# Patient Record
Sex: FEMALE | URBAN - METROPOLITAN AREA
[De-identification: names, ages, dates, MRNs, and addresses within clinical notes are randomized per-mention and may not be internally consistent; named-entity substitution may affect disease eponyms.]

---

## 2018-02-15 ENCOUNTER — APPOINTMENT (RX ONLY)
Dept: URBAN - METROPOLITAN AREA CLINIC 361 | Facility: CLINIC | Age: 84
Setting detail: DERMATOLOGY
End: 2018-02-15

## 2018-02-15 DIAGNOSIS — L20.89 OTHER ATOPIC DERMATITIS: ICD-10-CM

## 2018-02-15 PROBLEM — L20.84 INTRINSIC (ALLERGIC) ECZEMA: Status: ACTIVE | Noted: 2018-02-15

## 2018-02-15 PROBLEM — E03.9 HYPOTHYROIDISM, UNSPECIFIED: Status: ACTIVE | Noted: 2018-02-15

## 2018-02-15 PROCEDURE — ? TREATMENT REGIMEN

## 2018-02-15 PROCEDURE — ? COUNSELING

## 2018-02-15 PROCEDURE — 99202 OFFICE O/P NEW SF 15 MIN: CPT

## 2018-02-15 PROCEDURE — ? PRESCRIPTION

## 2018-02-15 RX ORDER — TRIAMCINOLONE ACETONIDE 1 MG/G
CREAM TOPICAL
Qty: 1 | Refills: 0 | Status: ERX | COMMUNITY
Start: 2018-02-15

## 2018-02-15 RX ADMIN — TRIAMCINOLONE ACETONIDE: 1 CREAM TOPICAL at 00:00

## 2018-02-15 ASSESSMENT — LOCATION DETAILED DESCRIPTION DERM
LOCATION DETAILED: RIGHT ANTERIOR PROXIMAL THIGH
LOCATION DETAILED: LEFT ULNAR DORSAL HAND

## 2018-02-15 ASSESSMENT — LOCATION ZONE DERM
LOCATION ZONE: LEG
LOCATION ZONE: HAND

## 2018-02-15 ASSESSMENT — LOCATION SIMPLE DESCRIPTION DERM
LOCATION SIMPLE: RIGHT THIGH
LOCATION SIMPLE: LEFT HAND

## 2018-02-15 NOTE — PROCEDURE: MIPS QUALITY
Quality 431: Preventive Care And Screening: Unhealthy Alcohol Use - Screening: Patient screened for unhealthy alcohol use using a single question and scores less than 2 times per year
Quality 131: Pain Assessment And Follow-Up: Pain assessment using a standardized tool is documented as negative, no follow-up plan required
Quality 226: Preventive Care And Screening: Tobacco Use: Screening And Cessation Intervention: Patient screened for tobacco and never smoked
Quality 110: Preventive Care And Screening: Influenza Immunization: Influenza Immunization previously received during influenza season
Quality 111:Pneumonia Vaccination Status For Older Adults: Pneumococcal Vaccination Previously Received
Quality 134: Screening For Clinical Depression And Follow-Up Plan: The patient was screened for depression and the screen was negative and no follow up required
Detail Level: Detailed

## 2019-02-19 ENCOUNTER — APPOINTMENT (RX ONLY)
Dept: URBAN - METROPOLITAN AREA CLINIC 361 | Facility: CLINIC | Age: 85
Setting detail: DERMATOLOGY
End: 2019-02-19

## 2019-02-19 DIAGNOSIS — L60.3 NAIL DYSTROPHY: ICD-10-CM

## 2019-02-19 PROBLEM — L85.3 XEROSIS CUTIS: Status: ACTIVE | Noted: 2019-02-19

## 2019-02-19 PROCEDURE — ? PRESCRIPTION

## 2019-02-19 PROCEDURE — ? TREATMENT REGIMEN

## 2019-02-19 PROCEDURE — ? COUNSELING

## 2019-02-19 PROCEDURE — 99212 OFFICE O/P EST SF 10 MIN: CPT

## 2019-02-19 RX ORDER — THYMOL
CRYSTALS MISCELLANEOUS
Qty: 1 | Refills: 2 | Status: ERX | COMMUNITY
Start: 2019-02-19

## 2019-02-19 RX ADMIN — Medication: at 16:40

## 2019-02-19 ASSESSMENT — LOCATION DETAILED DESCRIPTION DERM: LOCATION DETAILED: RIGHT MIDDLE FINGERNAIL

## 2019-02-19 ASSESSMENT — LOCATION ZONE DERM: LOCATION ZONE: FINGERNAIL

## 2019-02-19 ASSESSMENT — LOCATION SIMPLE DESCRIPTION DERM: LOCATION SIMPLE: RIGHT MIDDLE FINGERNAIL

## 2019-02-19 NOTE — PROCEDURE: TREATMENT REGIMEN
Plan: Per patient she would like to continue going to nail salon during treatment
Initiate Treatment: Thymol 4% - soak finger for 5-10 minutes qd
Detail Level: Zone

## 2019-02-19 NOTE — HPI: DISCOLORATION
How Severe Is Your Skin Discoloration?: mild
Additional History: Patient states that she soaked her nail in [place]

## 2019-02-19 NOTE — PROCEDURE: MIPS QUALITY
Quality 226: Preventive Care And Screening: Tobacco Use: Screening And Cessation Intervention: Patient screened for tobacco use and is an ex/non-smoker
Quality 47: Advance Care Plan: Advance Care Planning discussed and documented in the medical record; patient did not wish or was not able to name a surrogate decision maker or provide an advance care plan.
Quality 431: Preventive Care And Screening: Unhealthy Alcohol Use - Screening: Patient screened for unhealthy alcohol use using a single question and scores less than 2 times per year
Detail Level: Detailed

## 2019-04-17 ENCOUNTER — APPOINTMENT (RX ONLY)
Dept: URBAN - METROPOLITAN AREA CLINIC 361 | Facility: CLINIC | Age: 85
Setting detail: DERMATOLOGY
End: 2019-04-17

## 2019-04-17 DIAGNOSIS — L60.3 NAIL DYSTROPHY: ICD-10-CM | Status: IMPROVED

## 2019-04-17 DIAGNOSIS — D69.2 OTHER NONTHROMBOCYTOPENIC PURPURA: ICD-10-CM

## 2019-04-17 PROCEDURE — ? COUNSELING

## 2019-04-17 PROCEDURE — 99212 OFFICE O/P EST SF 10 MIN: CPT

## 2019-04-17 PROCEDURE — ? TREATMENT REGIMEN

## 2019-04-17 ASSESSMENT — LOCATION DETAILED DESCRIPTION DERM
LOCATION DETAILED: LEFT DISTAL DORSAL FOREARM
LOCATION DETAILED: RIGHT MIDDLE FINGERNAIL

## 2019-04-17 ASSESSMENT — LOCATION ZONE DERM
LOCATION ZONE: FINGERNAIL
LOCATION ZONE: ARM

## 2019-04-17 ASSESSMENT — LOCATION SIMPLE DESCRIPTION DERM
LOCATION SIMPLE: LEFT FOREARM
LOCATION SIMPLE: RIGHT MIDDLE FINGERNAIL

## 2019-06-04 ENCOUNTER — APPOINTMENT (RX ONLY)
Dept: URBAN - METROPOLITAN AREA CLINIC 361 | Facility: CLINIC | Age: 85
Setting detail: DERMATOLOGY
End: 2019-06-04

## 2019-06-04 DIAGNOSIS — L60.8 OTHER NAIL DISORDERS: ICD-10-CM

## 2019-06-04 DIAGNOSIS — L60.3 NAIL DYSTROPHY: ICD-10-CM | Status: IMPROVED

## 2019-06-04 PROBLEM — L20.84 INTRINSIC (ALLERGIC) ECZEMA: Status: ACTIVE | Noted: 2019-06-04

## 2019-06-04 PROCEDURE — ? COUNSELING

## 2019-06-04 PROCEDURE — 99212 OFFICE O/P EST SF 10 MIN: CPT

## 2019-06-04 PROCEDURE — ? TREATMENT REGIMEN

## 2019-06-04 ASSESSMENT — LOCATION DETAILED DESCRIPTION DERM: LOCATION DETAILED: RIGHT MIDDLE FINGERNAIL

## 2019-06-04 ASSESSMENT — LOCATION ZONE DERM: LOCATION ZONE: FINGERNAIL

## 2019-06-04 ASSESSMENT — LOCATION SIMPLE DESCRIPTION DERM: LOCATION SIMPLE: RIGHT MIDDLE FINGERNAIL

## 2019-06-04 NOTE — PROCEDURE: TREATMENT REGIMEN
Detail Level: Simple
Plan: Improved compared to previous photo.
Detail Level: Zone
Plan: .8mm of normal nail growth present today.\\nRecommend that patient keep nails short to prevent further trauma to nail.\\nRecommend 6 month follow up to recheck
Discontinue Regimen: Thymol - soak finger for 5-10 min  qd x 2 months

## 2019-10-02 ENCOUNTER — APPOINTMENT (EMERGENCY)
Dept: RADIOLOGY | Facility: HOSPITAL | Age: 84
End: 2019-10-02
Attending: EMERGENCY MEDICINE
Payer: MEDICARE

## 2019-10-02 ENCOUNTER — HOSPITAL ENCOUNTER (EMERGENCY)
Facility: HOSPITAL | Age: 84
Discharge: HOME | End: 2019-10-02
Attending: EMERGENCY MEDICINE
Payer: MEDICARE

## 2019-10-02 VITALS
TEMPERATURE: 98 F | OXYGEN SATURATION: 96 % | DIASTOLIC BLOOD PRESSURE: 55 MMHG | BODY MASS INDEX: 26.43 KG/M2 | SYSTOLIC BLOOD PRESSURE: 140 MMHG | WEIGHT: 140 LBS | HEIGHT: 61 IN | RESPIRATION RATE: 18 BRPM

## 2019-10-02 DIAGNOSIS — M54.50 ACUTE BILATERAL LOW BACK PAIN WITHOUT SCIATICA: ICD-10-CM
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
## 2019-10-02 PROCEDURE — 72100 X-RAY EXAM L-S SPINE 2/3 VWS: CPT

## 2019-10-02 PROCEDURE — 63600000 HC DRUGS/DETAIL CODE: Performed by: EMERGENCY MEDICINE

## 2019-10-02 PROCEDURE — 99283 EMERGENCY DEPT VISIT LOW MDM: CPT | Mod: 25

## 2019-10-02 PROCEDURE — 3E0233Z INTRODUCTION OF ANTI-INFLAMMATORY INTO MUSCLE, PERCUTANEOUS APPROACH: ICD-10-PCS | Performed by: EMERGENCY MEDICINE

## 2019-10-02 PROCEDURE — 96372 THER/PROPH/DIAG INJ SC/IM: CPT

## 2019-10-02 RX ORDER — LEVOTHYROXINE SODIUM 75 UG/1
75 TABLET ORAL
COMMUNITY
End: 2020-03-13 | Stop reason: SDUPTHER

## 2019-10-02 RX ORDER — HYDROCODONE BITARTRATE AND ACETAMINOPHEN 5; 325 MG/1; MG/1
1 TABLET ORAL EVERY 6 HOURS PRN
Qty: 12 TABLET | Refills: 0 | Status: SHIPPED | OUTPATIENT
Start: 2019-10-02 | End: 2019-10-06 | Stop reason: ALTCHOICE

## 2019-10-02 RX ORDER — RALOXIFENE HYDROCHLORIDE 60 MG/1
60 TABLET, FILM COATED ORAL DAILY
COMMUNITY
End: 2020-03-30 | Stop reason: SDUPTHER

## 2019-10-02 RX ORDER — KETOROLAC TROMETHAMINE 30 MG/ML
30 INJECTION, SOLUTION INTRAMUSCULAR; INTRAVENOUS ONCE
Status: COMPLETED | OUTPATIENT
Start: 2019-10-02 | End: 2019-10-02

## 2019-10-02 RX ADMIN — KETOROLAC TROMETHAMINE 30 MG: 30 INJECTION, SOLUTION INTRAMUSCULAR at 03:43

## 2019-10-02 ASSESSMENT — ENCOUNTER SYMPTOMS
NUMBNESS: 0
HEADACHES: 0
NECK PAIN: 0
SHORTNESS OF BREATH: 0
LOSS OF CONSCIOUSNESS: 0
SORE THROAT: 0
NAUSEA: 0
DIARRHEA: 0
FEVER: 0
WEAKNESS: 0
DIFFICULTY BREATHING: 0
COUGH: 0
BACK PAIN: 1
VOMITING: 0
ABDOMINAL PAIN: 0
COLOR CHANGE: 0

## 2019-10-02 NOTE — ED PROVIDER NOTES
HPI     Chief Complaint   Patient presents with   • Fall   • Back Pain         History provided by:  Patient, EMS personnel and nursing home  Trauma  Mechanism of injury: fall  Injury location: torso  Injury location detail: back  Incident location: nursing home  Arrived directly from scene: yes     Fall:       Fall occurred: walking and tripped       Height of fall: Same level       Impact surface: carpet       Point of impact: back       Entrapped after fall: no    EMS/PTA data:       Loss of consciousness: no    Current symptoms:       Pain scale: 5/10       Pain quality: aching       Associated symptoms:             Reports back pain.             Denies abdominal pain, chest pain, difficulty breathing, headache, loss of consciousness, nausea, neck pain and vomiting.     Relevant PMH:       Pharmacological risk factors:             No anticoagulation therapy or antiplatelet therapy.   Back Pain   Associated symptoms: no abdominal pain, no chest pain, no fever, no headaches, no numbness and no weakness         Patient History     Past Medical History:   Diagnosis Date   • Femur fracture (CMS/HCC)    • Hypothyroidism    • Shoulder fracture, left        Past Surgical History:   Procedure Laterality Date   • ADENOIDECTOMY     • JOINT REPLACEMENT     • TONSILLECTOMY         History reviewed. No pertinent family history.    Social History   Substance Use Topics   • Smoking status: Never Smoker   • Smokeless tobacco: Never Used   • Alcohol use Yes      Comment: social       Systems Reviewed from Nursing Triage:          Review of Systems     Review of Systems   Constitutional: Negative for fever.   HENT: Negative for sore throat.    Respiratory: Negative for cough and shortness of breath.    Cardiovascular: Negative for chest pain.   Gastrointestinal: Negative for abdominal pain, diarrhea, nausea and vomiting.   Musculoskeletal: Positive for back pain. Negative for neck pain.   Skin: Negative for color change.  "  Neurological: Negative for loss of consciousness, weakness, numbness and headaches.        Physical Exam     ED Triage Vitals [10/02/19 0339]   Temp Pulse Resp BP SpO2   36.7 °C (98 °F) -- 17 138/67 98 %      Temp Source Heart Rate Source Patient Position BP Location FiO2 (%) (Set)   Oral Monitor Lying Right upper arm --       Pulse Ox %: 98 % (10/02/19 0416)  Pulse Ox Interpretation: Normal (10/02/19 0416)          Patient Vitals for the past 24 hrs:   BP Temp Temp src Resp SpO2 Height Weight   10/02/19 0339 138/67 36.7 °C (98 °F) Oral 17 98 % 1.549 m (5' 1\") 63.5 kg (140 lb)           Physical Exam   Constitutional: She is oriented to person, place, and time. She appears well-developed. No distress.   HENT:   Head: Atraumatic.   Eyes: Conjunctivae are normal.   Neck: Normal range of motion.   Cardiovascular: Normal rate, regular rhythm, normal heart sounds and intact distal pulses.    Pulmonary/Chest: Effort normal and breath sounds normal. No respiratory distress. She has no wheezes. She has no rales.   Abdominal: Soft. Bowel sounds are normal. She exhibits no distension. There is no tenderness. There is no rebound and no guarding.   Musculoskeletal: She exhibits no edema, tenderness or deformity.        Left shoulder: She exhibits normal range of motion, no tenderness, no bony tenderness, no swelling, no effusion, no crepitus, no deformity, normal pulse and normal strength.        Thoracic back: She exhibits no tenderness, no bony tenderness, no swelling, no deformity and no pain.        Lumbar back: She exhibits decreased range of motion (mild). She exhibits no tenderness, no bony tenderness, no swelling, no deformity and no spasm.   Neurological: She is alert and oriented to person, place, and time. No cranial nerve deficit or sensory deficit. She exhibits normal muscle tone.   Skin: Skin is warm and dry.   Psychiatric: She has a normal mood and affect. Her behavior is normal.   Nursing note and vitals " reviewed.           Procedures    ED Course & [facility]     Labs Reviewed - No data to display    X-RAY LUMBAR SPINE 2 OR 3 VIEWS   ED Interpretation   No acute fracture/subluxation in lumbar spine. Mild wedging of T11.                  MDM         ED Course as of Oct 02 0438   Wed Oct 02, 2019   0415 Doubt that wedging of T11 is an acute finding. Pt localizes pain to lumbar spine. No pain/tenderness @thoracic area.  [HB]   0424 Pt states her reaction to codeine was nausea, states she has tolerated hydrocodone or oxycodone in the past.  [HB]   0430 Pt ambulates in ED with walker with steady gait.  [HB]      ED Course User Index  [HB] Pawan Francisco,          Clinical Impressions as of Oct 02 0438   Fall, initial encounter   Acute bilateral low back pain without sciatica        Pawan Francisco,   10/02/19 0438

## 2019-10-04 ENCOUNTER — TELEPHONE (OUTPATIENT)
Dept: INTERNAL MEDICINE | Facility: CLINIC | Age: 84
End: 2019-10-04

## 2019-10-04 ENCOUNTER — OFFICE VISIT (OUTPATIENT)
Dept: INTERNAL MEDICINE | Facility: CLINIC | Age: 84
End: 2019-10-04
Payer: MEDICARE

## 2019-10-04 ENCOUNTER — APPOINTMENT (OUTPATIENT)
Dept: LAB | Facility: HOSPITAL | Age: 84
End: 2019-10-04
Attending: INTERNAL MEDICINE
Payer: MEDICARE

## 2019-10-04 VITALS
OXYGEN SATURATION: 97 % | TEMPERATURE: 98.2 F | DIASTOLIC BLOOD PRESSURE: 64 MMHG | SYSTOLIC BLOOD PRESSURE: 128 MMHG | WEIGHT: 139.6 LBS | BODY MASS INDEX: 25.69 KG/M2 | HEART RATE: 69 BPM | HEIGHT: 62 IN

## 2019-10-04 DIAGNOSIS — S22.000A COMPRESSION FRACTURE OF BODY OF THORACIC VERTEBRA (CMS/HCC): ICD-10-CM

## 2019-10-04 DIAGNOSIS — Z13.0 SCREENING, ANEMIA, DEFICIENCY, IRON: ICD-10-CM

## 2019-10-04 DIAGNOSIS — H53.9 VISUAL DISTURBANCE: ICD-10-CM

## 2019-10-04 DIAGNOSIS — M54.50 ACUTE BILATERAL LOW BACK PAIN WITHOUT SCIATICA: ICD-10-CM

## 2019-10-04 DIAGNOSIS — M48.50XA: Primary | ICD-10-CM

## 2019-10-04 DIAGNOSIS — M80.00XA AGE-RELATED OSTEOPOROSIS WITH CURRENT PATHOLOGICAL FRACTURE, INITIAL ENCOUNTER: ICD-10-CM

## 2019-10-04 PROBLEM — H02.014: Status: ACTIVE | Noted: 2017-06-07

## 2019-10-04 PROBLEM — H02.011: Status: ACTIVE | Noted: 2017-06-07

## 2019-10-04 PROBLEM — H43.811 PVD (POSTERIOR VITREOUS DETACHMENT), RIGHT EYE: Status: ACTIVE | Noted: 2018-10-30

## 2019-10-04 PROBLEM — H16.002 CORNEA ULCER, LEFT: Status: ACTIVE | Noted: 2018-02-03

## 2019-10-04 PROBLEM — H35.351 CYSTOID MACULAR EDEMA, RIGHT: Status: ACTIVE | Noted: 2018-10-30

## 2019-10-04 PROBLEM — H35.371 EPIRETINAL MEMBRANE, RIGHT EYE: Status: ACTIVE | Noted: 2019-07-09

## 2019-10-04 LAB
ALBUMIN SERPL-MCNC: 4 G/DL (ref 3.4–5)
ALP SERPL-CCNC: 64 IU/L (ref 35–126)
ALT SERPL-CCNC: 21 IU/L (ref 11–54)
ANION GAP SERPL CALC-SCNC: 7 MEQ/L (ref 3–15)
AST SERPL-CCNC: 37 IU/L (ref 15–41)
BASOPHILS # BLD: 0.06 K/UL (ref 0.01–0.1)
BASOPHILS NFR BLD: 0.6 %
BILIRUB SERPL-MCNC: 1 MG/DL (ref 0.3–1.2)
BUN SERPL-MCNC: 21 MG/DL (ref 8–20)
CALCIUM SERPL-MCNC: 8.9 MG/DL (ref 8.9–10.3)
CHLORIDE SERPL-SCNC: 105 MEQ/L (ref 98–109)
CO2 SERPL-SCNC: 25 MEQ/L (ref 22–32)
CREAT SERPL-MCNC: 0.8 MG/DL
DIFFERENTIAL METHOD BLD: ABNORMAL
EOSINOPHIL # BLD: 0.05 K/UL (ref 0.04–0.36)
EOSINOPHIL NFR BLD: 0.5 %
ERYTHROCYTE [DISTWIDTH] IN BLOOD BY AUTOMATED COUNT: 13.2 % (ref 11.7–14.4)
GFR SERPL CREATININE-BSD FRML MDRD: >60 ML/MIN/1.73M*2
GLUCOSE SERPL-MCNC: 124 MG/DL (ref 70–99)
HCT VFR BLDCO AUTO: 44.8 %
HGB BLD-MCNC: 14.5 G/DL
IMM GRANULOCYTES # BLD AUTO: 0.07 K/UL (ref 0–0.08)
IMM GRANULOCYTES NFR BLD AUTO: 0.7 %
LYMPHOCYTES # BLD: 1.37 K/UL (ref 1.2–3.5)
LYMPHOCYTES NFR BLD: 13.7 %
MCH RBC QN AUTO: 30.6 PG (ref 28–33.2)
MCHC RBC AUTO-ENTMCNC: 32.4 G/DL (ref 32.2–35.5)
MCV RBC AUTO: 94.5 FL (ref 83–98)
MONOCYTES # BLD: 0.86 K/UL (ref 0.28–0.8)
MONOCYTES NFR BLD: 8.6 %
NEUTROPHILS # BLD: 7.58 K/UL (ref 1.7–7)
NEUTS SEG NFR BLD: 75.9 %
NRBC BLD-RTO: 0 %
PDW BLD AUTO: 11.4 FL (ref 9.4–12.3)
PLATELET # BLD AUTO: 159 K/UL
POTASSIUM SERPL-SCNC: 4.1 MEQ/L (ref 3.6–5.1)
PROT SERPL-MCNC: 6.6 G/DL (ref 6–8.2)
RBC # BLD AUTO: 4.74 M/UL (ref 3.93–5.22)
SODIUM SERPL-SCNC: 137 MEQ/L (ref 136–144)
WBC # BLD AUTO: 9.99 K/UL

## 2019-10-04 PROCEDURE — 99204 OFFICE O/P NEW MOD 45 MIN: CPT | Performed by: INTERNAL MEDICINE

## 2019-10-04 PROCEDURE — 36415 COLL VENOUS BLD VENIPUNCTURE: CPT | Performed by: INTERNAL MEDICINE

## 2019-10-04 PROCEDURE — 80053 COMPREHEN METABOLIC PANEL: CPT | Performed by: INTERNAL MEDICINE

## 2019-10-04 PROCEDURE — 85025 COMPLETE CBC W/AUTO DIFF WBC: CPT | Mod: GZ | Performed by: INTERNAL MEDICINE

## 2019-10-04 RX ORDER — TRAMADOL HYDROCHLORIDE 50 MG/1
50 TABLET ORAL EVERY 6 HOURS PRN
Qty: 20 TABLET | Refills: 0 | Status: SHIPPED | OUTPATIENT
Start: 2019-10-04 | End: 2019-10-18 | Stop reason: SDUPTHER

## 2019-10-04 RX ORDER — POLYVINYL ALCOHOL 14 MG/ML
1 SOLUTION/ DROPS OPHTHALMIC
COMMUNITY
End: 2022-03-23

## 2019-10-04 RX ORDER — IBANDRONATE SODIUM 150 MG/1
TABLET, FILM COATED ORAL
COMMUNITY
End: 2020-03-30 | Stop reason: SDUPTHER

## 2019-10-04 RX ORDER — TRAMADOL HYDROCHLORIDE 50 MG/1
50 TABLET ORAL EVERY 6 HOURS PRN
Qty: 20 TABLET | Refills: 0 | Status: SHIPPED | OUTPATIENT
Start: 2019-10-04 | End: 2019-10-04 | Stop reason: SDUPTHER

## 2019-10-04 ASSESSMENT — ENCOUNTER SYMPTOMS
NERVOUS/ANXIOUS: 0
FEVER: 0
HEADACHES: 0
CHILLS: 0
ARTHRALGIAS: 0
VOMITING: 0
UNEXPECTED WEIGHT CHANGE: 0
SLEEP DISTURBANCE: 0
DECREASED CONCENTRATION: 0
DIAPHORESIS: 0
ABDOMINAL DISTENTION: 0
PALPITATIONS: 0
BRUISES/BLEEDS EASILY: 0
ACTIVITY CHANGE: 0
HEMATURIA: 0
FREQUENCY: 0
BLOOD IN STOOL: 0
DIARRHEA: 0
COUGH: 0
ABDOMINAL PAIN: 0
JOINT SWELLING: 0
BACK PAIN: 1
CONSTIPATION: 1
DYSPHORIC MOOD: 0
NAUSEA: 1
MYALGIAS: 0
DYSURIA: 0
FATIGUE: 0
DIZZINESS: 0
APPETITE CHANGE: 0

## 2019-10-04 NOTE — TELEPHONE ENCOUNTER
JM, please call the pharmacy and let them know that we sent a prescription for tramadol.  I want to be sure that they could fill it says the patient and her family will pick it up on their way home.  Thank you

## 2019-10-04 NOTE — PROGRESS NOTES
Subjective   Same day    New patient.  Patient accompanied by her daughter.    Patient ID: Catia Ward is a 87 y.o. female.    HPI  Patient presents today to establish care.   She was seen in the ED 10/2 after tripping and falling onto her back. She was discharged with Hydrocodone-acetaminophen for pain.   Since discharge, she is ambulating with a walker. She has been taking the pain medication once nightly. It is making her very nauseous. Pain has persisted since discharge, pain stays in her back and does not radiate into her legs. Pain has slightly improved. She is able to move side to side where as she could not before.       The following have been reviewed and updated as appropriate in this visit:         Allergies   Allergen Reactions   • Codeine    • Gentamicin        Current Outpatient Prescriptions   Medication Sig Dispense Refill   • HYDROcodone-acetaminophen (NORCO) 5-325 mg per tablet Take 1 tablet by mouth every 6 (six) hours as needed for moderate pain. Initial treatment. No driving or operating heavy machinery when taking. 12 tablet 0   • levothyroxine (SYNTHROID) 75 mcg tablet Take 75 mcg by mouth daily.     • raloxifene (EVISTA) 60 mg tablet Take 60 mg by mouth daily.       No current facility-administered medications for this visit.        Past Medical History:   Diagnosis Date   • Femur fracture (CMS/HCC)    • Hypothyroidism    • Shoulder fracture, left      No family history on file.  Social History     Social History   • Marital status:      Spouse name: N/A   • Number of children: N/A   • Years of education: N/A     Occupational History   • Not on file.     Social History Main Topics   • Smoking status: Never Smoker   • Smokeless tobacco: Never Used   • Alcohol use Yes      Comment: social   • Drug use: No   • Sexual activity: Defer     Other Topics Concern   • Not on file     Social History Narrative   • No narrative on file       Review of Systems   Constitutional: Negative for  activity change, appetite change, chills, diaphoresis, fatigue, fever and unexpected weight change.   Respiratory: Negative for cough.    Cardiovascular: Negative for chest pain, palpitations and leg swelling.   Gastrointestinal: Positive for constipation and nausea. Negative for abdominal distention, abdominal pain, blood in stool, diarrhea and vomiting.   Genitourinary: Negative for dysuria, frequency, hematuria and urgency.   Musculoskeletal: Positive for back pain. Negative for arthralgias, joint swelling and myalgias.   Skin: Negative for rash.   Neurological: Negative for dizziness and headaches.   Hematological: Does not bruise/bleed easily.   Psychiatric/Behavioral: Negative for decreased concentration, dysphoric mood and sleep disturbance. The patient is not nervous/anxious.        Objective     There were no vitals taken for this visit.  BP Readings from Last 3 Encounters:   10/02/19 (!) 140/55     Wt Readings from Last 3 Encounters:   10/02/19 63.5 kg (140 lb)       Physical Exam   Constitutional: She is oriented to person, place, and time. She appears well-developed and well-nourished. No distress.   HENT:   Head: Normocephalic and atraumatic.   Right Ear: External ear normal.   Left Ear: External ear normal.   Mouth/Throat: Oropharynx is clear and moist. No oropharyngeal exudate.   Eyes: Pupils are equal, round, and reactive to light.   Neck: No JVD present. No tracheal deviation present.   Cardiovascular: Normal rate, regular rhythm, S1 normal, S2 normal, normal heart sounds and intact distal pulses.  Exam reveals no S3 and no S4.    No murmur heard.  Pulses:       Carotid pulses are 2+ on the right side, and 2+ on the left side.       Dorsalis pedis pulses are 2+ on the right side, and 2+ on the left side.        Posterior tibial pulses are 2+ on the right side, and 2+ on the left side.   Pulmonary/Chest: Effort normal and breath sounds normal. No stridor. No respiratory distress. She has no decreased  breath sounds. She has no wheezes. She has no rhonchi. She has no rales.   Abdominal: Soft. Normal appearance and bowel sounds are normal. She exhibits no abdominal bruit and no ascites. There is no hepatosplenomegaly. There is no tenderness.   No tinkling   Musculoskeletal: She exhibits tenderness. She exhibits no edema.   Patient with tenderness over the low thoracic spine to palpation.  No paravertebral muscle spasm.  Limited range of motion with flexion and extension.   Lymphadenopathy:     She has no cervical adenopathy.        Right cervical: No superficial cervical and no posterior cervical adenopathy present.       Left cervical: No superficial cervical and no posterior cervical adenopathy present.        Right: No supraclavicular adenopathy present.        Left: No supraclavicular adenopathy present.   Neurological: She is alert and oriented to person, place, and time.   Reflex Scores:       Patellar reflexes are 2+ on the right side and 2+ on the left side.  Motor both lower extremities +5/5 bilaterally.   Skin: Skin is warm, dry and intact. No rash noted.   Psychiatric: She has a normal mood and affect. Her speech is normal and behavior is normal.   Nursing note and vitals reviewed.      No results found for: WBC, HGB, HCT, MCV, PLT  No results found for: GLUCOSE, CALCIUM, NA, K, CO2, CL, BUN, CREATININE      Assessment/Plan   Problem List Items Addressed This Visit     None        1. Wedging of vertebra, initial encounter (CMS/[facility])  Patient recently moved to the area from [place].  I was able to access some of her records through care everywhere.  Patient's daughter present.  This is very challenging for them.  Patient moved to the area to be closer to family.  She was in [place] for many years.  She developed visual disturbances, and felt that she need to be near her family.  Her daughter lives in the area.  Patient sustained a fall.  She was taken to the emergency room.  No obvious fracture on the  x-ray of the thoracic spine.  However, patient has exquisite tenderness over the low thoracic area.  On reviewing the results of the x-ray, she does have wedging at T11.  That could be consistent with an acute compression fracture.  Patient does have known osteoporosis.  She is on dual therapy including Evista and alendronate.  Patient and her daughter very concerned about treatment.  We discussed conservative treatment with analgesics, and watchful waiting.  We also discussed a kyphoplasty.  The nature of the procedure discussed with patient and her daughter.  Patient's daughter had many questions and they were answered.  It is unclear whether the wedge deformity is new or old.  There are no x-rays in care everywhere to describe anything that would suggest a compression fracture.  She has no imaging of the spine.  However, her pain is significant enough that she could have a fracture.  It could also be a bone contusion.  We discussed kyphoplasty through interventional radiology.  But first, will need to do an MRI to see if there is truly an acute compression fracture, as this pain from an old compression fracture, or bone contusion.  The plan is for patient to add Tylenol to her regimen.  She will do 1 g up to 3 times per day.  Written instructions given to patient.  Instead of the Norco, patient will use tramadol at nighttime.  Since her incident, she is able to move a little bit better, and to find a comfortable position for sleeping.  I watched her get off of the exam table and she has good stability.  She is able to get off without difficulty.   - MRI THORACIC SPINE WITHOUT CONTRAST; Future    2. Acute bilateral low back pain without sciatica  As outlined above.  If they decide to pursue kyphoplasty if indicated, she will need an MRI.  - MRI THORACIC SPINE WITHOUT CONTRAST; Future    3. Compression fracture of body of thoracic vertebra (CMS/HCC)  Analgesia discussed.  Patient will have liver enzymes done to be  sure that she can take the Tylenol.    4. Age-related osteoporosis with current pathological fracture, initial encounter  As outlined above.    5. Screening, anemia, deficiency, iron  - CBC and Differential  - Comprehensive metabolic panel  - CBC  - Diff Count    6. Visual disturbance  Records from both were reviewed under care everywhere.    Encounter 45 minutes, greater than 50% spent evaluating patient, reviewing prior data, reviewing her data, discussed with patient and her daughter, coordinating care.      By signing my name below, I, Rosalia Kruger, attest that this documentation has been prepared under the direction and in the presence of Porsha Short DO    10/4/2019 2:44 PM  Rosalia Kruger  10/4/2019     I, Porsha Short, personally performed the services described in this documentation, as documented by the scribe in my presence, and it is both accurate and complete.  10/6/2019 11:09 AM

## 2019-10-04 NOTE — PATIENT INSTRUCTIONS
The procedure is called a Kyphoplasty     1.) Continue taking 2 extra strength tylenol every 4 hours for pain as needed, up to six per day.   Only 4 daily if you use the hyodrocodone and not the tylenol. Max daily is 3000 mg.   2.) Begin taking Colase for constipation.   3.) Pump your ankles and squeeze your butt muscles every hour

## 2019-10-06 PROBLEM — M48.50XA: Status: ACTIVE | Noted: 2019-10-06

## 2019-10-06 PROBLEM — S22.000A COMPRESSION FRACTURE OF BODY OF THORACIC VERTEBRA (CMS/HCC): Status: ACTIVE | Noted: 2019-10-06

## 2019-10-06 PROBLEM — M80.00XA AGE-RELATED OSTEOPOROSIS WITH CURRENT PATHOLOGICAL FRACTURE: Status: ACTIVE | Noted: 2019-10-06

## 2019-10-06 NOTE — TELEPHONE ENCOUNTER
Call to pt to see how she is doing.  She still having a lot of pain.  She is using the tramadol at night, and 2 doses of Tylenol, 1 g each, in the daytime.  As long as she is not moving, she is comfortable.  She is able to get into comfortable position.  However, her mobility is very limited.  She is interested in getting the MRI to see if this is new or old.  She has a requisition.  We will have call made to central scheduling by staff tomorrow morning, that we can try to get this scheduled for her.  Patient also had questions regarding analgesia, the procedure itself if she would decide to get it, and if I could give her something to keep her call during the MRI.  There would be no problem in mild sedation with benzodiazepine for the MRI.  She will need someone to take her to and from the procedure.  We will see how quickly we can get it scheduled.  Patient asked percentages regarding success of the kyphoplasty and potential complications.  We had discussed them briefly, but I do not know percentages.  I suggested the patient that we work on getting the imaging first, and then if she and her family are open to the idea, they can have a consultation with interventional radiologist and make an informed decision.  Patient appreciative of the call.

## 2019-10-06 NOTE — TELEPHONE ENCOUNTER
Franchesca, can you please call schedule scheduling and see if we can get an MRI on this patient for Monday, October 7.  Thank you so much.  The requisition is in the system.

## 2019-10-07 ENCOUNTER — HOSPITAL ENCOUNTER (OUTPATIENT)
Dept: RADIOLOGY | Facility: HOSPITAL | Age: 84
Discharge: HOME | End: 2019-10-07
Attending: INTERNAL MEDICINE
Payer: MEDICARE

## 2019-10-07 ENCOUNTER — TELEPHONE (OUTPATIENT)
Dept: INTERNAL MEDICINE | Facility: CLINIC | Age: 84
End: 2019-10-07

## 2019-10-07 DIAGNOSIS — M54.50 ACUTE BILATERAL LOW BACK PAIN WITHOUT SCIATICA: ICD-10-CM

## 2019-10-07 DIAGNOSIS — M48.50XA: ICD-10-CM

## 2019-10-07 PROCEDURE — 72146 MRI CHEST SPINE W/O DYE: CPT

## 2019-10-08 ENCOUNTER — TELEPHONE (OUTPATIENT)
Dept: INTERNAL MEDICINE | Facility: CLINIC | Age: 84
End: 2019-10-08

## 2019-10-08 DIAGNOSIS — S22.000A COMPRESSION FRACTURE OF BODY OF THORACIC VERTEBRA (CMS/HCC): Primary | ICD-10-CM

## 2019-10-08 NOTE — TELEPHONE ENCOUNTER
I spoke with pt's daughter - she is agreeable to go ahead with kyphoplasty - discussed possible biopsy of the bone at the same time as the procedure. Please let daughter know when the order has been placed - she will go ahead and schedule the appointment.I gave her the number to schedule ([phone])

## 2019-10-08 NOTE — TELEPHONE ENCOUNTER
Pt's daughter (Kim  [phone] )  called said that MRI of pt's  Lower Back  was done yesterday at [place]  Asking for  Call back with results .

## 2019-10-08 NOTE — TELEPHONE ENCOUNTER
Franchesca, there is a referral for interventional etiology, kyphoplasty with bone biopsy for this patient.  Can you please fax it to Shikha serrano.  Can you also give her a call, to be sure that she got it?  Carmen, nurse practitioner, had already spoken to 1 of the clinicians.

## 2019-10-08 NOTE — TELEPHONE ENCOUNTER
Pt called has been taking colace since Friday  3 on Friday and 3 on Saturday.  since then pt has been taking 6 pills a day pt still has not had Bowel Movement . Pt passing gas no abdomen pain . Pt asking what can she do pt # [phone]

## 2019-10-08 NOTE — TELEPHONE ENCOUNTER
Shikha called office back to apologize she talked  to the PA and disregard previous message ;  IR  can do Both studies and does not need another order

## 2019-10-09 ENCOUNTER — HOSPITAL ENCOUNTER (OUTPATIENT)
Dept: CARDIOLOGY | Facility: HOSPITAL | Age: 84
Discharge: HOME | End: 2019-10-09
Attending: RADIOLOGY | Admitting: RADIOLOGY
Payer: MEDICARE

## 2019-10-09 ENCOUNTER — TELEPHONE (OUTPATIENT)
Dept: INTERNAL MEDICINE | Facility: CLINIC | Age: 84
End: 2019-10-09

## 2019-10-09 NOTE — TELEPHONE ENCOUNTER
Pt calling still with bowel movement ; pt has been taking Colace past 4-5 days and one dose of Mirilax last night ; pt taking pain med QD and Tylenol in between ;  Is passing gas no stomach pain a little discomfort ; pt asking what else she can do ?  Has appt at 11 am  At [facility] her procedure will be leaving about 1015 am     Please advise  P/c [phone]

## 2019-10-09 NOTE — CONSULTS
Interventional Radiology Consultation    Diagnosis: Wedge compression fracture of unspecified thoracic vertebra, initial encounter for closed fracture [S22.000A]    HPI  Patient is a 87 y.o. female with a history of osteoporosis who presents to outpatient interventional radiology with her daughter to discuss possible kyphoplasty for her new T11 compression fracture. She was in her home and tripped, lost her balance and fell onto her bottom from a standing height. She had immediate pain in her mid-low back. She lives at [facility] and was brought to the ED via EMS for evaluation. X-ray of her lumbar spine showed wedging of T11 that was age indeterminate. She was ambulating well in the ED and was discharged home with pain medication. She followed up with her PCP who ordered and MRI of the thoracic spine to further evaluate the T11 vertebral body. MRI showed marrow edema and loss of height at T11 consistent with compression fracture. She has been taking tramadol at night for her pain and tylenol during the day. She states she can not tell if it is helping her pain at all. She has been using a heating pad on her back which makes her feel better. Since the fall she states she is feeling a little bit better. She states she is able to move around better then she was before and can get up and to her walker with minimal discomfort. She reports she has difficulty sitting up straight and walking long distances. She denies any paraesthesias or pain radiating down her legs. She reports constipation from the pain medications but denies any bowel or bladder incontinence.     Medical History:   Past Medical History:   Diagnosis Date   • Femur fracture (CMS/HCC)    • Hypothyroidism    • S/P knee replacement 2012   • Shoulder fracture, left        Surgical History:   Past Surgical History:   Procedure Laterality Date   • ADENOIDECTOMY     • JOINT REPLACEMENT     • TONSILLECTOMY         Allergies: Codeine; Gentamicin; and  Gentamicin sulfate    No current facility-administered medications on file prior to encounter.      Current Outpatient Prescriptions on File Prior to Encounter   Medication Sig   • calcium carbonate-vitamin D3 500 mg(1,250mg) -200 unit powder in packet Take by mouth.   • ibandronate (BONIVA) 150 mg tablet ibandronate 150 mg tablet   • levothyroxine (SYNTHROID) 75 mcg tablet Take 75 mcg by mouth daily.   • polyvinyl alcohol (LIQUIFILM TEARS) 1.4 % ophthalmic solution 1 drop.   • raloxifene (EVISTA) 60 mg tablet Take 60 mg by mouth daily.   • traMADol (ULTRAM) 50 mg tablet Take 1 tablet (50 mg total) by mouth every 6 (six) hours as needed for moderate pain for up to 10 days.       Objective     General appearance: alert, appears stated age, no distress and cooperative.  Head: normocephalic, without obvious abnormality, atraumatic  Back: spinal tenderness in the lower mid back at the level of the compression fracture. No paraspinal muscle tenderness.     Imaging  Significant findings include: MRI of the thoracic spine without contrast performed on 107/19 shows mild loss of height of T11 with retropulsion that contacts the ventral spinal cord. There is bone marrow edema consistent with compression fracture.      Assessment/Plan     Patient is a 87 y.o. female with a history of osteoporosis who presents to outpatient interventional radiology with her daughter to discuss possible kyphoplasty for her new T11 compression fracture. We discussed the procedure as well as the risks, benefits, expected outcomes and alternative options. We discussed her MRI results that show retropulsion to the spinal cord. We discussed exactly what that means and how that the kyphoplasty puts her at a very real risk of spinal cord injury including temporary or permanent paralysis. She states she is moving better and able to get to her walker without difficulty. Given the increased risk with the kyphoplasty, she is experiencing minimal  improvement in her symptoms and her fracture is only 1 week old we recommend proceeding with conservative treatment at this time. We were also asked to do a bone biopsy at the time of the kyphoplasty. We would recommend reevaluation of her symptoms in a month. If she is still having significant pain at the time we can consider repeat MRI to evaluate the bone density and see if a biopsy is still warranted. We can also discuss if a kyphoplasty is warranted. She and her daughter understand and are agreeable with this plan. All of their questions were answered.     Lore Osman PA-C  Referring Provider: Dr. Porsha Short  Time spent with the patient: 20 minutes      PAIN ASSESSMENT AND FOLLOW UP: MEASURE 131   [Performance Met: : Pain assessment documented as positive using a standardized tool AND a follow-up plan is documented.]   Pt has pain that is improving from her compression fracture. Cont pain regime and should cont to improve as the fracture heals.    MEASURE 130: CURRENT MEDICATIONS   Current medications documented:   [Performance Met: ] -obtained, updated, or reviewed current meds     MEASURE 110: PREVENTATIVE CARE AND SCREENING: INFLUENZA IMMUNIZATION- National Quality Strategy Domain: Community/Population Health   [Performance Met: Denominator Exception: Influenza immunization was not administered for reasons documented by clinician (e.g., patient allergy or other medical reasons, patient declined or other patient reasons, vaccine not available or other system   reasons)     MEASURE 47: CARE PLAN- National Quality Strategy Domain: Communication and Care Coordination   [Performance Met: Advance Care Planning discussed and documented in the medical record; patient did not wish or was not able to name a surrogate decision maker or provide an advance care plan]

## 2019-10-09 NOTE — TELEPHONE ENCOUNTER
Spoke with pt - relates she used 1 dose of miralax last evening. Advised to use miralax 2 times a day, conitnue colace, be sure to be drinking plenty of water.

## 2019-10-16 ENCOUNTER — TELEPHONE (OUTPATIENT)
Dept: INTERNAL MEDICINE | Facility: CLINIC | Age: 84
End: 2019-10-16

## 2019-10-16 NOTE — TELEPHONE ENCOUNTER
Pt calling still with pain ; pt has good and bad days ;  Pt asking for rf of Tramadol ; pt takes Tramadol  at night  ;Tylenol during the day as pt did not want to be a zombie though pt is going  to start taking  Tramadol during the  day because of the pain she is still having ; pt unable to have Kyphoplasty procedure     Please send rf to [facility] on pharmacy     Pt has f/u appt for Friday ; will check with her daughter to take her

## 2019-10-16 NOTE — TELEPHONE ENCOUNTER
Franchesca, tell patient she should take it during the day.  Even if she sleeps more while she is healing, she should take it.  Thanks

## 2019-10-17 NOTE — TELEPHONE ENCOUNTER
Spoke and informed pt she should take Tramadol during the day while she is healing even though she may sleeo more

## 2019-10-17 NOTE — TELEPHONE ENCOUNTER
Pt calling her daughter was unable to bring her to office tomorrow needs rf Tramadol has 4 left ;   Pt has f/u appt 10/29/19 instead      Please send med to [facility] on pharmacy

## 2019-10-18 RX ORDER — TRAMADOL HYDROCHLORIDE 50 MG/1
50 TABLET ORAL EVERY 6 HOURS PRN
Qty: 20 TABLET | Refills: 0 | Status: SHIPPED | OUTPATIENT
Start: 2019-10-18 | End: 2019-11-12 | Stop reason: SDUPTHER

## 2019-10-18 NOTE — TELEPHONE ENCOUNTER
Medicine Refill Request    Last Office Visit: 10/4/2019  Next Office Visit: 10/29/2019        Current Outpatient Medications:   •  calcium carbonate-vitamin D3 500 mg(1,250mg) -200 unit powder in packet, Take by mouth., Disp: , Rfl:   •  ibandronate (BONIVA) 150 mg tablet, ibandronate 150 mg tablet, Disp: , Rfl:   •  levothyroxine (SYNTHROID) 75 mcg tablet, Take 75 mcg by mouth daily., Disp: , Rfl:   •  polyvinyl alcohol (LIQUIFILM TEARS) 1.4 % ophthalmic solution, 1 drop., Disp: , Rfl:   •  raloxifene (EVISTA) 60 mg tablet, Take 60 mg by mouth daily., Disp: , Rfl:       BP Readings from Last 3 Encounters:   10/04/19 128/64   10/02/19 (!) 140/55       Recent Lab results:  No results found for: CHOL, No results found for: HDL, No results found for: LDLCALC, No results found for: TRIG     Lab Results   Component Value Date    GLUCOSE 124 (H) 10/04/2019   , No results found for: HGBA1C      Lab Results   Component Value Date    CREATININE 0.8 10/04/2019       No results found for: TSH

## 2019-10-29 ENCOUNTER — OFFICE VISIT (OUTPATIENT)
Dept: INTERNAL MEDICINE | Facility: CLINIC | Age: 84
End: 2019-10-29
Payer: MEDICARE

## 2019-10-29 ENCOUNTER — TELEPHONE (OUTPATIENT)
Dept: INTERNAL MEDICINE | Facility: CLINIC | Age: 84
End: 2019-10-29

## 2019-10-29 VITALS
SYSTOLIC BLOOD PRESSURE: 132 MMHG | BODY MASS INDEX: 25.21 KG/M2 | WEIGHT: 137 LBS | HEIGHT: 62 IN | OXYGEN SATURATION: 98 % | TEMPERATURE: 98.3 F | DIASTOLIC BLOOD PRESSURE: 82 MMHG | HEART RATE: 98 BPM

## 2019-10-29 DIAGNOSIS — E55.9 VITAMIN D DEFICIENCY: ICD-10-CM

## 2019-10-29 DIAGNOSIS — Z13.0 SCREENING, ANEMIA, DEFICIENCY, IRON: ICD-10-CM

## 2019-10-29 DIAGNOSIS — M48.50XD WEDGING OF VERTEBRA WITH ROUTINE HEALING, SUBSEQUENT ENCOUNTER: ICD-10-CM

## 2019-10-29 DIAGNOSIS — M80.00XD AGE-RELATED OSTEOPOROSIS WITH CURRENT PATHOLOGICAL FRACTURE WITH ROUTINE HEALING, SUBSEQUENT ENCOUNTER: ICD-10-CM

## 2019-10-29 DIAGNOSIS — S22.000A COMPRESSION FRACTURE OF BODY OF THORACIC VERTEBRA (CMS/HCC): Primary | ICD-10-CM

## 2019-10-29 DIAGNOSIS — Z12.31 ENCOUNTER FOR SCREENING MAMMOGRAM FOR MALIGNANT NEOPLASM OF BREAST: ICD-10-CM

## 2019-10-29 DIAGNOSIS — Z13.220 SCREENING, LIPID: ICD-10-CM

## 2019-10-29 DIAGNOSIS — Z78.0 MENOPAUSE: ICD-10-CM

## 2019-10-29 DIAGNOSIS — Z23 NEEDS FLU SHOT: ICD-10-CM

## 2019-10-29 DIAGNOSIS — Z13.89 SCREENING FOR HEMATURIA OR PROTEINURIA: ICD-10-CM

## 2019-10-29 DIAGNOSIS — E03.9 HYPOTHYROIDISM, UNSPECIFIED TYPE: ICD-10-CM

## 2019-10-29 PROCEDURE — 99214 OFFICE O/P EST MOD 30 MIN: CPT | Mod: 25 | Performed by: INTERNAL MEDICINE

## 2019-10-29 PROCEDURE — G0008 ADMIN INFLUENZA VIRUS VAC: HCPCS | Performed by: INTERNAL MEDICINE

## 2019-10-29 PROCEDURE — 90653 IIV ADJUVANT VACCINE IM: CPT | Performed by: INTERNAL MEDICINE

## 2019-10-29 ASSESSMENT — ENCOUNTER SYMPTOMS
PALPITATIONS: 0
SORE THROAT: 0
DIAPHORESIS: 0
DECREASED CONCENTRATION: 0
SLEEP DISTURBANCE: 0
BLOOD IN STOOL: 0
HEADACHES: 0
CHEST TIGHTNESS: 0
WHEEZING: 0
APPETITE CHANGE: 0
ABDOMINAL DISTENTION: 0
DYSURIA: 0
DYSPHORIC MOOD: 0
DIZZINESS: 0
ARTHRALGIAS: 0
BRUISES/BLEEDS EASILY: 0
CONSTIPATION: 0
ACTIVITY CHANGE: 0
NERVOUS/ANXIOUS: 0
DIARRHEA: 0
SHORTNESS OF BREATH: 0
FATIGUE: 0
UNEXPECTED WEIGHT CHANGE: 0
HEMATURIA: 0
JOINT SWELLING: 0
CHILLS: 0
BACK PAIN: 1
NAUSEA: 0
ABDOMINAL PAIN: 0
VOMITING: 0
MYALGIAS: 0
FREQUENCY: 0
COUGH: 0
RHINORRHEA: 0
FEVER: 0

## 2019-10-29 NOTE — PATIENT INSTRUCTIONS
1.) Check with Wellness at [facility] to schedule a time to have labs done. Blood work should be fasting.   2.) Schedule repeat MRI for as soon as possible.   3.) Schedule Mammogram and DEXA scan when you are feeling up to it

## 2019-10-29 NOTE — TELEPHONE ENCOUNTER
Nayeli,    Please call Wellness at [facility] for a copy of patient's medical records that they have on file. She was previously seen by Dr. Sutton in [place] and is new to the area.     Thank you

## 2019-10-29 NOTE — PROGRESS NOTES
Subjective   Follow Up     Patient ID: Catia Ward is a 87 y.o. female.    HPI  Patient presents today for a follow up examination. Patient was previously seen by Dr. Patel in [place].   Patient's back pain is manageable now. She is using Tylenol prn for pain. Her mobility is okay. She is still unable to sit or stand for long periods of time. Recently began taking walks down the hallway- using a walker. Would like to do some physical therapy at this point.     Osteoporosis  Monitor response to therapy. Patient taking Boniva.     Hypothyroidism  Monitor response to therapy     Shingrix discussed    The following have been reviewed and updated as appropriate in this visit:         Allergies   Allergen Reactions   • Codeine Nausea And Vomiting   • Gentamicin GI intolerance   • Gentamicin Sulfate GI intolerance     Eye redness       Current Outpatient Medications   Medication Sig Dispense Refill   • calcium carbonate-vitamin D3 500 mg(1,250mg) -200 unit powder in packet Take by mouth.     • ibandronate (BONIVA) 150 mg tablet ibandronate 150 mg tablet     • levothyroxine (SYNTHROID) 75 mcg tablet Take 75 mcg by mouth daily.     • polyvinyl alcohol (LIQUIFILM TEARS) 1.4 % ophthalmic solution 1 drop.     • raloxifene (EVISTA) 60 mg tablet Take 60 mg by mouth daily.     • traMADol (ULTRAM) 50 mg tablet Take 1 tablet (50 mg total) by mouth every 6 (six) hours as needed for moderate pain for up to 10 days. 20 tablet 0     No current facility-administered medications for this visit.        Past Medical History:   Diagnosis Date   • Femur fracture (CMS/HCC)    • Hypothyroidism    • S/P knee replacement 2012   • Shoulder fracture, left      No family history on file.  Social History     Socioeconomic History   • Marital status:      Spouse name: Not on file   • Number of children: Not on file   • Years of education: Not on file   • Highest education level: Not on file   Occupational History   • Not on file   Social  Needs   • Financial resource strain: Not on file   • Food insecurity:     Worry: Not on file     Inability: Not on file   • Transportation needs:     Medical: Not on file     Non-medical: Not on file   Tobacco Use   • Smoking status: Never Smoker   • Smokeless tobacco: Never Used   Substance and Sexual Activity   • Alcohol use: Yes     Comment: social   • Drug use: No   • Sexual activity: Defer   Lifestyle   • Physical activity:     Days per week: Not on file     Minutes per session: Not on file   • Stress: Not on file   Relationships   • Social connections:     Talks on phone: Not on file     Gets together: Not on file     Attends Mu-ism service: Not on file     Active member of club or organization: Not on file     Attends meetings of clubs or organizations: Not on file     Relationship status: Not on file   • Intimate partner violence:     Fear of current or ex partner: Not on file     Emotionally abused: Not on file     Physically abused: Not on file     Forced sexual activity: Not on file   Other Topics Concern   • Not on file   Social History Narrative   • Not on file       Review of Systems   Constitutional: Negative for activity change, appetite change, chills, diaphoresis, fatigue, fever and unexpected weight change.   HENT: Negative for congestion, ear pain, postnasal drip, rhinorrhea and sore throat.    Eyes: Negative for visual disturbance.   Respiratory: Negative for cough, chest tightness, shortness of breath and wheezing.    Cardiovascular: Negative for chest pain, palpitations and leg swelling.   Gastrointestinal: Negative for abdominal distention, abdominal pain, blood in stool, constipation, diarrhea, nausea and vomiting.   Genitourinary: Negative for dysuria, frequency, hematuria and urgency.   Musculoskeletal: Positive for back pain. Negative for arthralgias, joint swelling and myalgias.   Skin: Negative for rash.   Neurological: Negative for dizziness and headaches.   Hematological: Does not  "bruise/bleed easily.   Psychiatric/Behavioral: Negative for decreased concentration, dysphoric mood and sleep disturbance. The patient is not nervous/anxious.        Objective     Visit Vitals  /82 (BP Location: Left upper arm, Patient Position: Sitting)   Pulse 98   Temp 36.8 °C (98.3 °F) (Oral)   Ht 1.575 m (5' 2\")   Wt 62.1 kg (137 lb)   SpO2 98%   BMI 25.06 kg/m²     BP Readings from Last 3 Encounters:   10/29/19 132/82   10/04/19 128/64   10/02/19 (!) 140/55     Wt Readings from Last 3 Encounters:   10/29/19 62.1 kg (137 lb)   10/04/19 63.3 kg (139 lb 9.6 oz)   10/02/19 63.5 kg (140 lb)       Physical Exam   Constitutional: She is oriented to person, place, and time. She appears well-developed and well-nourished. No distress.   HENT:   Head: Normocephalic and atraumatic.   Right Ear: External ear normal.   Left Ear: External ear normal.   Mouth/Throat: Oropharynx is clear and moist. No oropharyngeal exudate.   Eyes: Pupils are equal, round, and reactive to light.   Neck: No JVD present. No tracheal deviation present.   Cardiovascular: Normal rate, regular rhythm, S1 normal, S2 normal and intact distal pulses. Exam reveals no S3 and no S4.   Murmur heard.   Systolic murmur is present with a grade of 2/6.  Pulses:       Carotid pulses are 2+ on the right side, and 2+ on the left side.       Dorsalis pedis pulses are 2+ on the right side, and 2+ on the left side.        Posterior tibial pulses are 2+ on the right side, and 2+ on the left side.   Murmur heard best at the right sternal boarder   Pulmonary/Chest: Effort normal and breath sounds normal. No stridor. No respiratory distress. She has no decreased breath sounds. She has no wheezes. She has no rhonchi. She has no rales.   Breasts with no masses, no nipple discharge, no skin retraction, no axillary nodes bilaterally.      Abdominal: Soft. Normal appearance and bowel sounds are normal. She exhibits no abdominal bruit and no ascites. There is no " hepatosplenomegaly. There is no tenderness.   Musculoskeletal: She exhibits no edema.   Lymphadenopathy:     She has no cervical adenopathy.        Right cervical: No superficial cervical and no posterior cervical adenopathy present.       Left cervical: No superficial cervical and no posterior cervical adenopathy present.        Right: No supraclavicular adenopathy present.        Left: No supraclavicular adenopathy present.   Neurological: She is alert and oriented to person, place, and time.   Reflex Scores:       Patellar reflexes are 2+ on the right side and 2+ on the left side.  Skin: Skin is warm, dry and intact. No rash noted.   Psychiatric: She has a normal mood and affect. Her speech is normal and behavior is normal.   Nursing note and vitals reviewed.      Lab Results   Component Value Date    WBC 9.99 10/04/2019    HGB 14.5 10/04/2019    HCT 44.8 10/04/2019    MCV 94.5 10/04/2019     10/04/2019     Lab Results   Component Value Date    GLUCOSE 124 (H) 10/04/2019    CALCIUM 8.9 10/04/2019     10/04/2019    K 4.1 10/04/2019    CO2 25 10/04/2019     10/04/2019    BUN 21 (H) 10/04/2019    CREATININE 0.8 10/04/2019         Assessment/Plan   Problem List Items Addressed This Visit     None        1. Compression fracture of body of thoracic vertebra (CMS/HCC)  Patient with traumatic compression fracture of the thoracic spine.  There was an issue as to whether or not she may even have changes consistent with myeloma or metastatic disease.  Patient's hemoglobin is robust.  We will check an SPEP.  Patient has no breast mass.  Her last mammography was couple years ago.  Patient never smoked.  No family history of lung cancer.  She was exposed to secondhand smoke.  MRI to be repeated as it has been 4 weeks.  We will see what that area looks like.  In the interim, will do appropriate screening for cancers.  Patient was not a candidate for kyphoplasty because of the location of the fracture to the  spinal column.  - Ambulatory referral to Physical Therapy; Future  - DEXA BONE DENSITY; Future  - MRI THORACIC SPINE WITHOUT CONTRAST; Future    2. Wedging of vertebra with routine healing, subsequent encounter  Patient is doing much better.  After about 2 weeks, the pain improved.  Continues to improve slowly.  She is more mobile.  She is walking with a walker.  It is manageable.  - DEXA BONE DENSITY; Future    3. Hypothyroidism, unspecified type  Clinically euthyroid.  Check TSH.  - TSH 3rd Generation; Future    4. Age-related osteoporosis with current pathological fracture with routine healing, subsequent encounter    - DEXA BONE DENSITY; Future  - Protein Elect, Serum w/Interp; Future  - Rheumatoid factor; Future  - PTH, intact; Future    5. Needs flu shot  Will obtain records from [facility] to see that patient is up-to-date with her other immunizations.  - Influenza vaccine 65 and older IM preservative free (FluAd)    6. Screening, anemia, deficiency, iron    - Basic metabolic panel; Future    7. Screening, lipid    - Lipid panel; Future    8. Screening for hematuria or proteinuria    - Urinalysis with microscopic; Future    9. Vitamin D deficiency    - Vitamin D 1,25-Dihydroxy; Future    10. Encounter for screening mammogram for malignant neoplasm of breast  Normal breast exam today.  - BI SCREENING MAMMOGRAM BILATERAL; Future    11. Menopause  Patient for bone density follow-up.      I, Porsha Short, personally performed the services described in this documentation, as documented by the scribe in my presence, and it is both accurate and complete.  10/29/2019 2:34 PM

## 2019-11-01 ENCOUNTER — APPOINTMENT (OUTPATIENT)
Dept: LAB | Facility: HOSPITAL | Age: 84
End: 2019-11-01
Attending: INTERNAL MEDICINE
Payer: MEDICARE

## 2019-11-01 DIAGNOSIS — Z13.89 SCREENING FOR HEMATURIA OR PROTEINURIA: ICD-10-CM

## 2019-11-01 DIAGNOSIS — E03.9 HYPOTHYROIDISM, UNSPECIFIED TYPE: ICD-10-CM

## 2019-11-01 DIAGNOSIS — M80.00XD AGE-RELATED OSTEOPOROSIS WITH CURRENT PATHOLOGICAL FRACTURE WITH ROUTINE HEALING, SUBSEQUENT ENCOUNTER: ICD-10-CM

## 2019-11-01 DIAGNOSIS — Z13.0 SCREENING, ANEMIA, DEFICIENCY, IRON: ICD-10-CM

## 2019-11-01 DIAGNOSIS — Z13.220 SCREENING, LIPID: ICD-10-CM

## 2019-11-01 DIAGNOSIS — E55.9 VITAMIN D DEFICIENCY: ICD-10-CM

## 2019-11-01 LAB
ANION GAP SERPL CALC-SCNC: 10 MEQ/L (ref 3–15)
BACTERIA URNS QL MICRO: ABNORMAL /HPF
BILIRUB UR QL STRIP.AUTO: NEGATIVE MG/DL
BUN SERPL-MCNC: 14 MG/DL (ref 8–20)
CALCIUM SERPL-MCNC: 9.6 MG/DL (ref 8.9–10.3)
CHLORIDE SERPL-SCNC: 106 MEQ/L (ref 98–109)
CHOLEST SERPL-MCNC: 173 MG/DL
CLARITY UR REFRACT.AUTO: CLEAR
CO2 SERPL-SCNC: 26 MEQ/L (ref 22–32)
COLOR UR AUTO: YELLOW
CREAT SERPL-MCNC: 0.8 MG/DL
GFR SERPL CREATININE-BSD FRML MDRD: >60 ML/MIN/1.73M*2
GLUCOSE SERPL-MCNC: 95 MG/DL (ref 70–99)
GLUCOSE UR STRIP.AUTO-MCNC: NEGATIVE MG/DL
HDLC SERPL-MCNC: 52 MG/DL
HDLC SERPL: 3.3 {RATIO}
HGB UR QL STRIP.AUTO: 2
HYALINE CASTS #/AREA URNS LPF: ABNORMAL /LPF
KETONES UR STRIP.AUTO-MCNC: NEGATIVE MG/DL
LDLC SERPL CALC-MCNC: 106 MG/DL
LEUKOCYTE ESTERASE UR QL STRIP.AUTO: 2
NITRITE UR QL STRIP.AUTO: NEGATIVE
NONHDLC SERPL-MCNC: 121 MG/DL
PH UR STRIP.AUTO: 6 [PH]
POTASSIUM SERPL-SCNC: 4.1 MEQ/L (ref 3.6–5.1)
PROT UR QL STRIP.AUTO: NEGATIVE
RBC #/AREA URNS HPF: ABNORMAL /HPF
SODIUM SERPL-SCNC: 142 MEQ/L (ref 136–144)
SP GR UR REFRACT.AUTO: 1.03
SQUAMOUS URNS QL MICRO: 1 /HPF
TRANS CELLS URNS QL MICRO: 1 /HPF
TRIGL SERPL-MCNC: 76 MG/DL (ref 30–149)
TSH SERPL DL<=0.05 MIU/L-ACNC: 1.93 MIU/L (ref 0.34–5.6)
UROBILINOGEN UR STRIP-ACNC: 0.2 EU/DL
WBC #/AREA URNS HPF: ABNORMAL /HPF

## 2019-11-01 PROCEDURE — 80061 LIPID PANEL: CPT

## 2019-11-01 PROCEDURE — 36415 COLL VENOUS BLD VENIPUNCTURE: CPT

## 2019-11-01 PROCEDURE — 80048 BASIC METABOLIC PNL TOTAL CA: CPT

## 2019-11-01 PROCEDURE — 82652 VIT D 1 25-DIHYDROXY: CPT

## 2019-11-01 PROCEDURE — 86431 RHEUMATOID FACTOR QUANT: CPT

## 2019-11-01 PROCEDURE — 81001 URINALYSIS AUTO W/SCOPE: CPT

## 2019-11-01 PROCEDURE — 84165 PROTEIN E-PHORESIS SERUM: CPT

## 2019-11-01 PROCEDURE — 83970 ASSAY OF PARATHORMONE: CPT

## 2019-11-01 PROCEDURE — 84443 ASSAY THYROID STIM HORMONE: CPT

## 2019-11-02 LAB
CALCIUM SERPL-MCNC: 9.6 MG/DL (ref 8.9–10.3)
PTH-INTACT SERPL-MCNC: 29.3 PG/ML (ref 12–88)

## 2019-11-03 LAB — RHEUMATOID FACT SERPL-ACNC: <20 IU/ML

## 2019-11-04 LAB
ALBUMIN MFR UR ELPH: 54 % (ref 48–62)
ALBUMIN SERPL ELPH-MCNC: 3.29 G/DL (ref 3.2–4.5)
ALBUMIN/GLOB SERPL: 1.2 {RATIO} (ref 1.1–2.1)
ALPHA1 GLOB MFR SERPL ELPH: 4.6 % (ref 2.1–4.8)
ALPHA1 GLOB SERPL ELPH-MCNC: 0.28 G/DL (ref 0.15–0.32)
ALPHA2 GLOB MFR UR ELPH: 16.2 % (ref 9.7–16.2)
ALPHA2 GLOB SERPL ELPH-MCNC: 0.99 G/DL (ref 0.7–1.1)
B-GLOBULIN SERPL ELPH-MCNC: 0.95 G/DL (ref 0.73–1.3)
BETA1 GLOB MFR UR ELPH: 15.6 % (ref 10.8–17.9)
GAMMA GLOB MFR UR ELPH: 9.7 % (ref 9–23)
GAMMA GLOB SERPL ELPH-MCNC: 0.59 G/DL (ref 0.6–1.6)
PROT PATTERN SERPL ELPH-IMP: NORMAL
PROT SERPL-MCNC: 6.1 G/DL (ref 6–8.2)

## 2019-11-06 ENCOUNTER — TELEPHONE (OUTPATIENT)
Dept: INTERNAL MEDICINE | Facility: CLINIC | Age: 84
End: 2019-11-06

## 2019-11-06 LAB
1,25(OH)2D SERPL-MCNC: 26 PG/ML (ref 18–72)
1,25(OH)2D2 SERPL-MCNC: <8 PG/ML
1,25(OH)2D3 SERPL-MCNC: 26 PG/ML

## 2019-11-06 NOTE — TELEPHONE ENCOUNTER
Elsi, call pt and be sure she is taking vit d. She needs to take 2000 iu's. If she already is, she should take two at the same time. thanks

## 2019-11-06 NOTE — TELEPHONE ENCOUNTER
Elsi, let pt know that her labs look good. She does have blood and WBC in her urine. Is she having any UTI symptoms? If so, have Litzy create a req for a urine culture, and fax it over to the wellness center at [facility].  They can collect it for us.  If not, let me know.  Thank you

## 2019-11-11 ENCOUNTER — TELEPHONE (OUTPATIENT)
Dept: INTERNAL MEDICINE | Facility: CLINIC | Age: 84
End: 2019-11-11

## 2019-11-11 NOTE — TELEPHONE ENCOUNTER
Pt calling asking for rf re Tramadol pt is med at [facility]      Pt asking to  p/c when sent to pharmacy so she will be aware

## 2019-11-12 RX ORDER — TRAMADOL HYDROCHLORIDE 50 MG/1
50 TABLET ORAL EVERY 6 HOURS PRN
Qty: 20 TABLET | Refills: 0 | Status: SHIPPED | OUTPATIENT
Start: 2019-11-12 | End: 2020-01-31 | Stop reason: ALTCHOICE

## 2019-11-12 NOTE — TELEPHONE ENCOUNTER
Need to send new script to pharmacy for tramadol 50 mg   [facility] on pharmacy  [phone]      pt out of medication

## 2019-11-14 ENCOUNTER — HOSPITAL ENCOUNTER (OUTPATIENT)
Dept: RADIOLOGY | Facility: HOSPITAL | Age: 84
Discharge: HOME | End: 2019-11-14
Attending: INTERNAL MEDICINE
Payer: MEDICARE

## 2019-11-14 DIAGNOSIS — S22.000A COMPRESSION FRACTURE OF BODY OF THORACIC VERTEBRA (CMS/HCC): ICD-10-CM

## 2019-11-14 PROCEDURE — 72146 MRI CHEST SPINE W/O DYE: CPT

## 2019-11-21 DIAGNOSIS — R93.89 ABNORMAL FINDING PRESENT ON DIAGNOSTIC IMAGING OF UTERUS: Primary | ICD-10-CM

## 2019-11-22 NOTE — PROGRESS NOTES
Call to patient about MRI of the spine.  I reached her voicemail.  Left message that I will call her back later to review.  I then reached out to her daughter, Kim.  I explained the findings.  I also asked how mom is doing.  She states that mom reports she is feeling better she is feeling better.  Less pain.  A little more mobile.  She is still having her meals up in her unit at [facility].  Using less pain medication.  We do want to obtain an ultrasound of the pelvis to see what is going on.  That will guide with what needs to be done next. I created req for the study. Kim will help her mom schedule the study. I will reach out to her mom again. Kim appreciative of the call.

## 2019-12-03 ENCOUNTER — HOSPITAL ENCOUNTER (OUTPATIENT)
Dept: RADIOLOGY | Facility: HOSPITAL | Age: 84
Discharge: HOME | End: 2019-12-03
Attending: INTERNAL MEDICINE
Payer: MEDICARE

## 2019-12-03 DIAGNOSIS — R93.89 ABNORMAL FINDING PRESENT ON DIAGNOSTIC IMAGING OF UTERUS: ICD-10-CM

## 2019-12-03 PROCEDURE — 76856 US EXAM PELVIC COMPLETE: CPT

## 2019-12-06 ENCOUNTER — TELEPHONE (OUTPATIENT)
Dept: INTERNAL MEDICINE | Facility: CLINIC | Age: 84
End: 2019-12-06

## 2019-12-06 NOTE — TELEPHONE ENCOUNTER
Pt daughter called and will like to get the results from her mother ultrasound that she had gotten done on Tuesday. Kim will like a call back at [phone].

## 2019-12-06 NOTE — TELEPHONE ENCOUNTER
Elsi, let pts daughter know that her Mom has a widened endometrial stripe, which means the lining of the uterus is thicker than is should be. She should be evaluated by a gynecologist now. They will do a biopsy of the area. I recommend Dr Rose Boyle or Dr Masters. They are in the same group here at [place]. Thanks

## 2019-12-09 ENCOUNTER — TELEPHONE (OUTPATIENT)
Dept: INTERNAL MEDICINE | Facility: CLINIC | Age: 84
End: 2019-12-09

## 2019-12-09 NOTE — TELEPHONE ENCOUNTER
Sheila, call pts daughter and give them Dr Tolbert's number. They can see any one in that group, including Sheila French, the nurse practitioner, who is wonderful.  Thanks

## 2019-12-09 NOTE — TELEPHONE ENCOUNTER
Pt called asking for another GYN physician . Pt called the other recommendation  they don't have opening til end of January.pt asking who else you would recommend  Pt [phone]

## 2020-01-16 ENCOUNTER — TELEPHONE (OUTPATIENT)
Dept: INTERNAL MEDICINE | Facility: CLINIC | Age: 85
End: 2020-01-16

## 2020-01-16 NOTE — TELEPHONE ENCOUNTER
Pt calling her physical therapist  Ilir suggested pt get an updated walker Rolator with a seat    Dx  Difficulty walking / gait dysfunction   Dr BRAN NPI #  [id number]     Please mail script  to pt

## 2020-01-16 NOTE — LETTER
Catia Ward   9/18/1932      Please dispense rollator walker with seat  DX gait dysfunction      Please let us know if you have any questions or issues,  Porsha Short, DO  1/16/2020

## 2020-01-31 ENCOUNTER — OFFICE VISIT (OUTPATIENT)
Dept: INTERNAL MEDICINE | Facility: CLINIC | Age: 85
End: 2020-01-31
Payer: MEDICARE

## 2020-01-31 VITALS
WEIGHT: 133.4 LBS | BODY MASS INDEX: 24.55 KG/M2 | TEMPERATURE: 98 F | HEART RATE: 89 BPM | OXYGEN SATURATION: 94 % | SYSTOLIC BLOOD PRESSURE: 122 MMHG | HEIGHT: 62 IN | DIASTOLIC BLOOD PRESSURE: 82 MMHG

## 2020-01-31 DIAGNOSIS — E03.9 HYPOTHYROIDISM, UNSPECIFIED TYPE: ICD-10-CM

## 2020-01-31 DIAGNOSIS — M80.00XD AGE-RELATED OSTEOPOROSIS WITH CURRENT PATHOLOGICAL FRACTURE WITH ROUTINE HEALING, SUBSEQUENT ENCOUNTER: ICD-10-CM

## 2020-01-31 DIAGNOSIS — S22.000A COMPRESSION FRACTURE OF BODY OF THORACIC VERTEBRA (CMS/HCC): Primary | ICD-10-CM

## 2020-01-31 DIAGNOSIS — M48.50XD WEDGING OF VERTEBRA WITH ROUTINE HEALING, SUBSEQUENT ENCOUNTER: ICD-10-CM

## 2020-01-31 DIAGNOSIS — R93.89 ENDOMETRIAL STRIPE INCREASED: ICD-10-CM

## 2020-01-31 PROCEDURE — 99214 OFFICE O/P EST MOD 30 MIN: CPT | Performed by: INTERNAL MEDICINE

## 2020-01-31 ASSESSMENT — ENCOUNTER SYMPTOMS
UNEXPECTED WEIGHT CHANGE: 0
SHORTNESS OF BREATH: 0
SLEEP DISTURBANCE: 0
ARTHRALGIAS: 0
DYSURIA: 0
CONSTIPATION: 1
SORE THROAT: 0
FREQUENCY: 0
BLOOD IN STOOL: 0
NAUSEA: 0
DYSPHORIC MOOD: 0
DIARRHEA: 0
MYALGIAS: 0
JOINT SWELLING: 0
CHEST TIGHTNESS: 0
DIZZINESS: 0
FEVER: 0
ABDOMINAL PAIN: 0
WHEEZING: 0
BRUISES/BLEEDS EASILY: 0
DIAPHORESIS: 0
FATIGUE: 0
DECREASED CONCENTRATION: 0
RHINORRHEA: 0
HEMATURIA: 0
BACK PAIN: 1
ABDOMINAL DISTENTION: 0
VOMITING: 0
ACTIVITY CHANGE: 0
HEADACHES: 0
CHILLS: 0
WEAKNESS: 1
APPETITE CHANGE: 0
PALPITATIONS: 0
COUGH: 0
NERVOUS/ANXIOUS: 0

## 2020-01-31 NOTE — PATIENT INSTRUCTIONS
1.) Continue taking Caltrate tablet 2 times per day with food.  1000 IU tablets of Vitamin D. You should get at least 2000 IU of Vitamin D total daily.   The recommended daily value for calcium is 1200 mg.    2.) Please call to schedule your Mammogram and Bone Density

## 2020-01-31 NOTE — PROGRESS NOTES
Subjective   Follow Up     Patient ID: Catia Ward is a 87 y.o. female.    HPI  Patient presents today for a follow up examination.   Patient is feeling better. She is having difficulty with sustained walking- she is able to walk the distance of 2 hallways without significant pain. She still have intermittent back pain and some weakness. She has completed physical therapy.   She is able to stand without pain in the back. She is able to sleep pain free.   Notes some discomfort in the left eye- Associated with dryness. She is working with Dr. Mckenna.     Hypothyroidism  Monitor response to therapy     Mammogram and DEXA due now    The following have been reviewed and updated as appropriate in this visit:         Allergies   Allergen Reactions   • Codeine Nausea And Vomiting   • Gentamicin GI intolerance   • Gentamicin Sulfate GI intolerance     Eye redness       Current Outpatient Medications   Medication Sig Dispense Refill   • calcium carbonate-vitamin D3 500 mg(1,250mg) -200 unit powder in packet Take by mouth.     • ibandronate (BONIVA) 150 mg tablet ibandronate 150 mg tablet     • levothyroxine (SYNTHROID) 75 mcg tablet Take 75 mcg by mouth daily.     • polyvinyl alcohol (LIQUIFILM TEARS) 1.4 % ophthalmic solution 1 drop.     • raloxifene (EVISTA) 60 mg tablet Take 60 mg by mouth daily.       No current facility-administered medications for this visit.        Past Medical History:   Diagnosis Date   • Femur fracture (CMS/[facility])    • Hypothyroidism    • S/P knee replacement 2012   • Shoulder fracture, left      No family history on file.  Social History     Socioeconomic History   • Marital status:      Spouse name: Not on file   • Number of children: Not on file   • Years of education: Not on file   • Highest education level: Not on file   Occupational History   • Not on file   Social Needs   • Financial resource strain: Not on file   • Food insecurity:     Worry: Not on file     Inability: Not on file   •  Transportation needs:     Medical: Not on file     Non-medical: Not on file   Tobacco Use   • Smoking status: Never Smoker   • Smokeless tobacco: Never Used   Substance and Sexual Activity   • Alcohol use: Yes     Comment: social   • Drug use: No   • Sexual activity: Defer   Lifestyle   • Physical activity:     Days per week: Not on file     Minutes per session: Not on file   • Stress: Not on file   Relationships   • Social connections:     Talks on phone: Not on file     Gets together: Not on file     Attends Caodaism service: Not on file     Active member of club or organization: Not on file     Attends meetings of clubs or organizations: Not on file     Relationship status: Not on file   • Intimate partner violence:     Fear of current or ex partner: Not on file     Emotionally abused: Not on file     Physically abused: Not on file     Forced sexual activity: Not on file   Other Topics Concern   • Not on file   Social History Narrative   • Not on file       Review of Systems   Constitutional: Negative for activity change, appetite change, chills, diaphoresis, fatigue, fever and unexpected weight change.   HENT: Negative for congestion, ear pain, postnasal drip, rhinorrhea and sore throat.    Eyes: Negative for visual disturbance.        Eye dryness     Respiratory: Negative for cough, chest tightness, shortness of breath and wheezing.    Cardiovascular: Negative for chest pain, palpitations and leg swelling.   Gastrointestinal: Positive for constipation. Negative for abdominal distention, abdominal pain, blood in stool, diarrhea, nausea and vomiting.   Genitourinary: Negative for dysuria, frequency, hematuria and urgency.        Nocturia x2   Musculoskeletal: Positive for back pain. Negative for arthralgias, joint swelling and myalgias.   Skin: Negative for rash.   Neurological: Positive for weakness. Negative for dizziness and headaches.   Hematological: Does not bruise/bleed easily.   Psychiatric/Behavioral:  "Negative for decreased concentration, dysphoric mood and sleep disturbance. The patient is not nervous/anxious.        Objective     Visit Vitals  /82 (BP Location: Left upper arm, Patient Position: Sitting)   Pulse 89   Temp 36.7 °C (98 °F) (Oral)   Ht 1.575 m (5' 2\")   Wt 60.5 kg (133 lb 6.4 oz)   SpO2 94%   BMI 24.40 kg/m²     BP Readings from Last 3 Encounters:   01/31/20 122/82   10/29/19 132/82   10/04/19 128/64     Wt Readings from Last 3 Encounters:   01/31/20 60.5 kg (133 lb 6.4 oz)   10/29/19 62.1 kg (137 lb)   10/04/19 63.3 kg (139 lb 9.6 oz)       Physical Exam   Constitutional: She is oriented to person, place, and time. She appears well-developed and well-nourished. No distress.   HENT:   Head: Normocephalic and atraumatic.   Right Ear: External ear normal.   Left Ear: External ear normal.   Mouth/Throat: Oropharynx is clear and moist. No oropharyngeal exudate.   Eyes: Pupils are equal, round, and reactive to light.   Neck: No JVD present. No tracheal deviation present.   Cardiovascular: Normal rate, regular rhythm, S1 normal, S2 normal, normal heart sounds and intact distal pulses. Exam reveals no S3 and no S4.   No murmur heard.  Pulses:       Carotid pulses are 2+ on the right side, and 2+ on the left side.       Dorsalis pedis pulses are 2+ on the right side, and 2+ on the left side.        Posterior tibial pulses are 2+ on the right side, and 2+ on the left side.   Pulmonary/Chest: Effort normal and breath sounds normal. No stridor. No respiratory distress. She has no decreased breath sounds. She has no wheezes. She has no rhonchi. She has no rales.   Abdominal: Soft. Normal appearance and bowel sounds are normal. She exhibits no abdominal bruit and no ascites. There is no hepatosplenomegaly. There is no tenderness.   Musculoskeletal: She exhibits no edema.   Mild kyphosis with scoliosis with thoracic prominence left.  No pain to percussion or palpation over the thoracic spine.  Warty lesion " at the base of the nail, second digit, left hand.    Lymphadenopathy:     She has no cervical adenopathy.        Right cervical: No superficial cervical and no posterior cervical adenopathy present.       Left cervical: No superficial cervical and no posterior cervical adenopathy present.        Right: No supraclavicular adenopathy present.        Left: No supraclavicular adenopathy present.   Neurological: She is alert and oriented to person, place, and time.   Reflex Scores:       Patellar reflexes are 2+ on the right side and 2+ on the left side.  Skin: Skin is warm, dry and intact. No rash noted.   Psychiatric: She has a normal mood and affect. Her speech is normal and behavior is normal.   Nursing note and vitals reviewed.      Lab Results   Component Value Date    WBC 9.99 10/04/2019    HGB 14.5 10/04/2019    HCT 44.8 10/04/2019    MCV 94.5 10/04/2019     10/04/2019     Lab Results   Component Value Date    GLUCOSE 95 11/01/2019    CALCIUM 9.6 11/01/2019    CALCIUM 9.6 11/01/2019     11/01/2019    K 4.1 11/01/2019    CO2 26 11/01/2019     11/01/2019    BUN 14 11/01/2019    CREATININE 0.8 11/01/2019         Assessment/Plan   Problem List Items Addressed This Visit        Musculoskeletal    Wedging of vertebra (CMS/HCC)    Compression fracture of body of thoracic vertebra (CMS/HCC) - Primary    Age-related osteoporosis with current pathological fracture       Endocrine/Metabolic    Hypothyroidism        1. Compression fracture of body of thoracic vertebra (CMS/HCC)  Patient is status post evaluation for kyphoplasty.  However, under the circumstances, she is not a good candidate.  Patient is doing so much better.  She is off of all pain medication.  She has no pain with sitting or when she is supine.  After she walks a distance, she does get some pain in her back.  She has to sit down and rest for a bit.  She uses her walker when she ambulates to the hallways at [facility].  Overall,  patient is doing so much better.    2. Wedging of vertebra with routine healing, subsequent encounter  Activity level has improved.  Patient is no longer requiring medication.    3. Age-related osteoporosis with current pathological fracture with routine healing, subsequent encounter  Patient is on both Evista and the alendronate.  She is tolerating them well.  We discussed calcium supplementation and vitamin D supplementation.  Information given to patient.    4. Hypothyroidism, unspecified type  Continue current dose of Synthroid.  Most recent TSH at 1.93 in November.    5.  Endometrial stripe increased  Incidental finding of widened endometrial stripe on the MRI of the spine.  Patient subsequently had an ultrasound which confirmed the endometrial thickening.  Patient been evaluated by Dr. Hermelindo Shah.  She had an endometrial biopsy.  No malignant cells.  Evaluation and studies reviewed with patient.    Addendum.  Patient encouraged to follow-up on her bone density and her mammogram.  New requisitions created.        By signing my name below, I, Rosalia Kruger, attest that this documentation has been prepared under the direction and in the presence of Porsha Short DO  1/31/2020 5:14 PM  Porsha Short DO     I, Porsha Short, personally performed the services described in this documentation, as documented by the scribe in my presence, and it is both accurate and complete.  1/31/2020 5:22 PM    1/31/2020

## 2020-02-06 ENCOUNTER — TELEPHONE (OUTPATIENT)
Dept: INTERNAL MEDICINE | Facility: CLINIC | Age: 85
End: 2020-02-06

## 2020-02-06 NOTE — TELEPHONE ENCOUNTER
Pt calling re Rollator walker order that Dr BRAN needs to sign  ( printed  )      please fax to [facility] at   Fax # [phone]     Faxed signed script  ; fax confirmed

## 2020-02-06 NOTE — TELEPHONE ENCOUNTER
Lam MOORE, I think you already did this. Please confirm. 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

## 2020-03-13 ENCOUNTER — TELEPHONE (OUTPATIENT)
Dept: INTERNAL MEDICINE | Facility: CLINIC | Age: 85
End: 2020-03-13

## 2020-03-13 RX ORDER — LEVOTHYROXINE SODIUM 75 UG/1
75 TABLET ORAL
Qty: 90 TABLET | Refills: 1 | Status: SHIPPED | OUTPATIENT
Start: 2020-03-13 | End: 2020-09-03

## 2020-03-30 ENCOUNTER — RX ONLY (OUTPATIENT)
Age: 86
Setting detail: RX ONLY
End: 2020-03-30

## 2020-03-30 RX ORDER — TRIAMCINOLONE ACETONIDE 1 MG/G
CREAM TOPICAL
Qty: 1 | Refills: 0 | Status: ERX

## 2020-03-30 RX ORDER — IBANDRONATE SODIUM 150 MG/1
150 TABLET, FILM COATED ORAL
Qty: 3 TABLET | Refills: 2 | Status: SHIPPED | OUTPATIENT
Start: 2020-03-30 | End: 2021-01-19

## 2020-03-30 RX ORDER — RALOXIFENE HYDROCHLORIDE 60 MG/1
60 TABLET, FILM COATED ORAL DAILY
Qty: 90 TABLET | Refills: 2 | Status: SHIPPED | OUTPATIENT
Start: 2020-03-30 | End: 2021-02-26

## 2020-03-30 NOTE — TELEPHONE ENCOUNTER
Boniva 150mg - once a month  Evista 60mg - once a day    90 day supply  [facility]

## 2020-04-27 ENCOUNTER — TELEMEDICINE (OUTPATIENT)
Dept: INTERNAL MEDICINE | Facility: CLINIC | Age: 85
End: 2020-04-27
Payer: MEDICARE

## 2020-04-27 DIAGNOSIS — S22.000A COMPRESSION FRACTURE OF BODY OF THORACIC VERTEBRA (CMS/HCC): ICD-10-CM

## 2020-04-27 DIAGNOSIS — M80.00XD AGE-RELATED OSTEOPOROSIS WITH CURRENT PATHOLOGICAL FRACTURE WITH ROUTINE HEALING, SUBSEQUENT ENCOUNTER: Primary | ICD-10-CM

## 2020-04-27 PROCEDURE — 99442 PR PHYS/QHP TELEPHONE EVALUATION 11-20 MIN: CPT | Performed by: INTERNAL MEDICINE

## 2020-04-27 NOTE — PROGRESS NOTES
Request for Consent:  You and I are about to have a telemedicine check-in or visit. This is allowed because you are already my patient, and you have requested it.  This telemedicine visit will be billed to your health insurance or you, if you are self-insured.  You understand you will be responsible for any copayments or coinsurances that apply to your telemedicine visit.  Before starting our telemedicine visit, I am required to get your consent for this virtual check-in or visit by telemedicine. Do you consent?      Patient Response to Request for Consent: Yes    The following have been reviewed and updated as appropriate in this visit:  Allergies       Reiewed: Meds/Allergies/Prob list/Medical Hx    Visit Documentation:    Patient is status post fall, resulting in compression fracture.  She had evaluation by IR.  She has had evaluation by gynecologist because of the finding on her MRI of the lumbar spine.  She has since done very well.  Her pain is manageable.  She is ambulating with a walker.  She lives in an assisted living.  The hallways are long.  She needs her walker to ambulate.  At times, she gets extreme fatigue in her back, and just needs to sit down.  She is able to maneuver in her apartment.  Patient was wondering what to expect as far as recovery.  She had been having physical therapy.  That is now completed, mainly because of COVID-19.  However, she feels that she had approached a plateau.  She went to have a discussion regarding the compression fracture.    Together, we reviewed the MRI of her spine, the location of the fracture, which was at T11.  We discussed her current functional status.  It is actually amazing.  Considering where she could not sit still without pain, she is now ambulating and able to not only perform her own ADLs, but also maintain her apartment, and walks through the halls of the assisted living.    We discussed healing of the fracture.  She will continue to have  challenges, and so the goal here is to adequately treat the osteoporosis.  We discussed calcium supplementation, Boniva, raloxifene, and vitamin D supplementation.    Overall, patient's quality of life is good.  Continue current regimen.\    1. Age-related osteoporosis with current pathological fracture with routine healing, subsequent encounter  Continue current treatment of osteoporosis.    2. Compression fracture of body of thoracic vertebra (CMS/HCC)  Healing.        Time Spent in Medical Discussion During This Encounter:  18 minutes

## 2020-06-04 RX ORDER — HYDROCORTISONE 25 MG/G
OINTMENT TOPICAL
COMMUNITY
Start: 2020-05-06 | End: 2022-03-23

## 2020-06-04 RX ORDER — FLUOROMETHOLONE 1 MG/ML
1 SUSPENSION/ DROPS OPHTHALMIC 2 TIMES DAILY
COMMUNITY
Start: 2020-06-02 | End: 2023-07-20 | Stop reason: ALTCHOICE

## 2020-07-24 ENCOUNTER — TELEPHONE (OUTPATIENT)
Dept: INTERNAL MEDICINE | Facility: CLINIC | Age: 85
End: 2020-07-24

## 2020-07-24 NOTE — TELEPHONE ENCOUNTER
Pt's son in law called pt is staying with hid family and (Josh son in law ) went away camping was on a plane  Does josh need to self quarintine  For 14 days or can josh just wear an mask in the same house and social distancing  Josh # [phone]

## 2020-08-14 ENCOUNTER — LAB REQUISITION (OUTPATIENT)
Dept: LAB | Facility: HOSPITAL | Age: 85
End: 2020-08-14
Attending: INTERNAL MEDICINE
Payer: MEDICARE

## 2020-08-14 DIAGNOSIS — U07.1 COVID-19: ICD-10-CM

## 2020-08-14 PROCEDURE — U0003 INFECTIOUS AGENT DETECTION BY NUCLEIC ACID (DNA OR RNA); SEVERE ACUTE RESPIRATORY SYNDROME CORONAVIRUS 2 (SARS-COV-2) (CORONAVIRUS DISEASE [COVID-19]), AMPLIFIED PROBE TECHNIQUE, MAKING USE OF HIGH THROUGHPUT TECHNOLOGIES AS DESCRIBED BY CMS-2020-01-R: HCPCS | Performed by: INTERNAL MEDICINE

## 2020-08-17 LAB — SARS-COV-2 RNA RESP QL NAA+PROBE: NOT DETECTED

## 2020-08-28 ENCOUNTER — OFFICE VISIT (OUTPATIENT)
Dept: INTERNAL MEDICINE | Facility: CLINIC | Age: 85
End: 2020-08-28
Payer: MEDICARE

## 2020-08-28 VITALS
SYSTOLIC BLOOD PRESSURE: 130 MMHG | HEART RATE: 92 BPM | DIASTOLIC BLOOD PRESSURE: 82 MMHG | OXYGEN SATURATION: 98 % | WEIGHT: 140.2 LBS | HEIGHT: 62 IN | TEMPERATURE: 98.6 F | RESPIRATION RATE: 16 BRPM | BODY MASS INDEX: 25.8 KG/M2

## 2020-08-28 DIAGNOSIS — Z13.0 SCREENING, ANEMIA, DEFICIENCY, IRON: ICD-10-CM

## 2020-08-28 DIAGNOSIS — Z12.31 ENCOUNTER FOR SCREENING MAMMOGRAM FOR BREAST CANCER: ICD-10-CM

## 2020-08-28 DIAGNOSIS — Z78.0 MENOPAUSE: ICD-10-CM

## 2020-08-28 DIAGNOSIS — Z13.89 SCREENING FOR BLOOD OR PROTEIN IN URINE: ICD-10-CM

## 2020-08-28 DIAGNOSIS — M80.00XD AGE-RELATED OSTEOPOROSIS WITH CURRENT PATHOLOGICAL FRACTURE WITH ROUTINE HEALING, SUBSEQUENT ENCOUNTER: ICD-10-CM

## 2020-08-28 DIAGNOSIS — S22.000A COMPRESSION FRACTURE OF BODY OF THORACIC VERTEBRA (CMS/HCC): Primary | ICD-10-CM

## 2020-08-28 DIAGNOSIS — H18.603 KERATOCONUS OF BOTH EYES: ICD-10-CM

## 2020-08-28 DIAGNOSIS — E03.9 HYPOTHYROIDISM, UNSPECIFIED TYPE: ICD-10-CM

## 2020-08-28 DIAGNOSIS — Z13.220 SCREENING, LIPID: ICD-10-CM

## 2020-08-28 DIAGNOSIS — E55.9 VITAMIN D DEFICIENCY: ICD-10-CM

## 2020-08-28 PROCEDURE — 99214 OFFICE O/P EST MOD 30 MIN: CPT | Performed by: INTERNAL MEDICINE

## 2020-08-28 ASSESSMENT — ENCOUNTER SYMPTOMS
COUGH: 0
UNEXPECTED WEIGHT CHANGE: 0
DIAPHORESIS: 0
ABDOMINAL DISTENTION: 0
HEADACHES: 0
FREQUENCY: 0
ARTHRALGIAS: 0
ABDOMINAL PAIN: 0
CHEST TIGHTNESS: 0
WHEEZING: 0
BLOOD IN STOOL: 0
BRUISES/BLEEDS EASILY: 0
DECREASED CONCENTRATION: 0
RHINORRHEA: 0
APPETITE CHANGE: 0
DIARRHEA: 0
ACTIVITY CHANGE: 0
BACK PAIN: 1
NUMBNESS: 1
JOINT SWELLING: 0
DIZZINESS: 0
NAUSEA: 0
MYALGIAS: 0
NERVOUS/ANXIOUS: 0
HEMATURIA: 0
SORE THROAT: 0
SHORTNESS OF BREATH: 0
CHILLS: 0
FEVER: 0
DYSURIA: 0
FATIGUE: 0
DYSPHORIC MOOD: 0
CONSTIPATION: 0
PALPITATIONS: 0

## 2020-08-28 NOTE — PROGRESS NOTES
Subjective      Follow up chronic conditions.   Patient ID: Catia Ward is a 87 y.o. female.    HPI  Patient presents for an office visit.     Patient has been doing core exercises to assist with back pain. Patient says she can walk a decent distance without her walker. Patient mentions difficulties with eyes and visions - she is working with ophthalmology. Patient notes occasional numbness in toes without pain. She also mentions leg swelling late in the day that clears up by morning.    Hypothyroidism.   Monitor response to therapy.    The following have been reviewed and updated as appropriate in this visit:  Allergies  Meds  Problems         Allergies   Allergen Reactions   • Codeine Nausea And Vomiting   • Gentamicin GI intolerance   • Gentamicin Sulfate GI intolerance     Eye redness       Current Outpatient Medications   Medication Sig Dispense Refill   • calcium carbonate-vitamin D3 500 mg(1,250mg) -200 unit powder in packet Take by mouth.     • fluorometholone (FML) 0.1 % ophthalmic suspension      • hydrocortisone 2.5 % ointment      • ibandronate (BONIVA) 150 mg tablet Take 1 tablet (150 mg total) by mouth every 30 (thirty) days. Take in AM with glass of water prior to food, don't lie down for 30 minutes. 3 tablet 2   • levothyroxine (SYNTHROID) 75 mcg tablet Take 1 tablet (75 mcg total) by mouth daily. 90 tablet 1   • polyvinyl alcohol (LIQUIFILM TEARS) 1.4 % ophthalmic solution 1 drop.     • raloxifene (EVISTA) 60 mg tablet Take 1 tablet (60 mg total) by mouth daily. 90 tablet 2     No current facility-administered medications for this visit.        Past Medical History:   Diagnosis Date   • Femur fracture (CMS/[facility])    • Hypothyroidism    • S/P knee replacement 2012   • Shoulder fracture, left      History reviewed. No pertinent family history.  Social History     Socioeconomic History   • Marital status:      Spouse name: Not on file   • Number of children: Not on file   • Years of education:  Not on file   • Highest education level: Not on file   Occupational History   • Not on file   Social Needs   • Financial resource strain: Not on file   • Food insecurity:     Worry: Not on file     Inability: Not on file   • Transportation needs:     Medical: Not on file     Non-medical: Not on file   Tobacco Use   • Smoking status: Never Smoker   • Smokeless tobacco: Never Used   Substance and Sexual Activity   • Alcohol use: Yes     Comment: social   • Drug use: No   • Sexual activity: Defer   Lifestyle   • Physical activity:     Days per week: Not on file     Minutes per session: Not on file   • Stress: Not on file   Relationships   • Social connections:     Talks on phone: Not on file     Gets together: Not on file     Attends Baptism service: Not on file     Active member of club or organization: Not on file     Attends meetings of clubs or organizations: Not on file     Relationship status: Not on file   • Intimate partner violence:     Fear of current or ex partner: Not on file     Emotionally abused: Not on file     Physically abused: Not on file     Forced sexual activity: Not on file   Other Topics Concern   • Not on file   Social History Narrative   • Not on file       Review of Systems   Constitutional: Negative for activity change, appetite change, chills, diaphoresis, fatigue, fever and unexpected weight change.   HENT: Negative for congestion, ear pain, postnasal drip, rhinorrhea and sore throat.    Eyes: Negative for visual disturbance.   Respiratory: Negative for cough, chest tightness, shortness of breath and wheezing.    Cardiovascular: Positive for leg swelling (at night). Negative for chest pain and palpitations.   Gastrointestinal: Negative for abdominal distention, abdominal pain, blood in stool, constipation, diarrhea and nausea.   Genitourinary: Negative for dysuria, frequency, hematuria and urgency.   Musculoskeletal: Positive for back pain (improving with core exercises). Negative for  "arthralgias, joint swelling and myalgias.   Skin: Negative for rash.   Neurological: Positive for numbness (in toes, not painful). Negative for dizziness and headaches.   Hematological: Does not bruise/bleed easily.   Psychiatric/Behavioral: Negative for decreased concentration and dysphoric mood. The patient is not nervous/anxious.        Objective     Visit Vitals  /82 (BP Location: Left upper arm, Patient Position: Sitting)   Pulse 92   Temp 37 °C (98.6 °F)   Resp 16   Ht 1.575 m (5' 2\")   Wt 63.6 kg (140 lb 3.2 oz)   SpO2 98%   BMI 25.64 kg/m²     BP Readings from Last 3 Encounters:   08/28/20 130/82   01/31/20 122/82   10/29/19 132/82     Wt Readings from Last 3 Encounters:   08/28/20 63.6 kg (140 lb 3.2 oz)   01/31/20 60.5 kg (133 lb 6.4 oz)   10/29/19 62.1 kg (137 lb)       Physical Exam   Constitutional: She is oriented to person, place, and time. She appears well-developed and well-nourished. No distress.   HENT:   Head: Normocephalic and atraumatic.   Right Ear: External ear normal.   Left Ear: External ear normal.   Mouth/Throat: Oropharynx is clear and moist. No oropharyngeal exudate.   Eyes: Pupils are equal, round, and reactive to light.   Neck: No JVD present. No tracheal deviation present. No thyromegaly present.   Cardiovascular: Normal rate, regular rhythm, normal heart sounds and intact distal pulses.   No murmur heard.  Pulmonary/Chest: Effort normal and breath sounds normal. No stridor. No respiratory distress. She has no wheezes. She has no rales.   Abdominal: Soft. Bowel sounds are normal. She exhibits no distension and no mass. There is no tenderness. There is no guarding.   Musculoskeletal: Normal range of motion. She exhibits no edema.   Lymphadenopathy:     She has no cervical adenopathy.   Neurological: She is alert and oriented to person, place, and time.   Breasts with no masses, no nipple discharge, no skin retraction, no axillary nodes bilaterally.    Skin: Skin is warm and dry. "   Hernandez hemangiomas   Psychiatric: She has a normal mood and affect. Her behavior is normal.   Nursing note and vitals reviewed.      Lab Results   Component Value Date    WBC 9.99 10/04/2019    HGB 14.5 10/04/2019    HCT 44.8 10/04/2019    MCV 94.5 10/04/2019     10/04/2019     Lab Results   Component Value Date    GLUCOSE 95 11/01/2019    CALCIUM 9.6 11/01/2019    CALCIUM 9.6 11/01/2019     11/01/2019    K 4.1 11/01/2019    CO2 26 11/01/2019     11/01/2019    BUN 14 11/01/2019    CREATININE 0.8 11/01/2019         Assessment/Plan   Problem List Items Addressed This Visit        Nervous    Keratoconus of both eyes       Musculoskeletal    Compression fracture of body of thoracic vertebra (CMS/HCC) - Primary    Age-related osteoporosis with current pathological fracture       Endocrine/Metabolic    Hypothyroidism    Relevant Orders    TSH 3rd Generation      Other Visit Diagnoses     Screening, anemia, deficiency, iron        Relevant Orders    CBC and Differential    Comprehensive metabolic panel    Screening, lipid        Relevant Orders    Lipid panel    Screening for blood or protein in urine        Relevant Orders    Urinalysis with microscopic    Vitamin D deficiency        Relevant Orders    Vitamin D 1,25-Dihydroxy    Menopause        Relevant Orders    DEXA BONE DENSITY    Encounter for screening mammogram for breast cancer        Relevant Orders    BI SCREENING MAMMOGRAM BILATERAL          1. Compression fracture of body of thoracic vertebra (CMS/HCC)  Patient is much improved.  She is more active.  She still uses her walker.  Pain is much improved.    2. Age-related osteoporosis with current pathological fracture with routine healing, subsequent encounter  Continue current treatment of the osteoporosis.    3. Hypothyroidism, unspecified type  TSH in November at 1.93.  - TSH 3rd Generation; Future    4. Keratoconus of both eyes  As per the specialist.    5. Screening, anemia, deficiency,  iron    - CBC and Differential; Future  - Comprehensive metabolic panel; Future    6. Screening, lipid    - Lipid panel; Future    7. Screening for blood or protein in urine    - Urinalysis with microscopic; Future    8. Vitamin D deficiency    - Vitamin D 1,25-Dihydroxy; Future    9. Menopause  Patient is not yet ready to go into the hospital for anything routine.  She should do so when she feels ready.  - DEXA BONE DENSITY; Future    10. Encounter for screening mammogram for breast cancer  Once her back pain improves little bit more.  And when she is ready.  - BI SCREENING MAMMOGRAM BILATERAL; Future    By signing my name below, I, Martha Talley, attest that this documentation has been prepared under the direction and in the presence of Porsha Short,   8/30/2020 6:13 PM     I, Porsha Short, personally performed the services described in this documentation, as documented by the scribe in my presence, and it is both accurate and complete.  8/30/2020 6:16 PM    Porsha Short, DO  8/30/2020

## 2020-08-31 ENCOUNTER — TELEPHONE (OUTPATIENT)
Dept: INTERNAL MEDICINE | Facility: CLINIC | Age: 85
End: 2020-08-31

## 2020-08-31 DIAGNOSIS — S22.000A COMPRESSION FRACTURE OF BODY OF THORACIC VERTEBRA (CMS/HCC): Primary | ICD-10-CM

## 2020-09-01 NOTE — TELEPHONE ENCOUNTER
Patient called asking for a script for Physical Therapy for Back pain.      Please mail to the patient.  
Script created please mail  
Script mailed   
Statement Selected

## 2020-10-21 PROCEDURE — U0003 INFECTIOUS AGENT DETECTION BY NUCLEIC ACID (DNA OR RNA); SEVERE ACUTE RESPIRATORY SYNDROME CORONAVIRUS 2 (SARS-COV-2) (CORONAVIRUS DISEASE [COVID-19]), AMPLIFIED PROBE TECHNIQUE, MAKING USE OF HIGH THROUGHPUT TECHNOLOGIES AS DESCRIBED BY CMS-2020-01-R: HCPCS | Performed by: INTERNAL MEDICINE

## 2020-10-22 ENCOUNTER — LAB REQUISITION (OUTPATIENT)
Dept: LAB | Facility: HOSPITAL | Age: 85
End: 2020-10-22
Payer: MEDICARE

## 2020-10-22 DIAGNOSIS — U07.1 COVID-19: ICD-10-CM

## 2020-10-24 LAB — SARS-COV-2 RNA RESP QL NAA+PROBE: NOT DETECTED

## 2020-10-27 ENCOUNTER — LAB REQUISITION (OUTPATIENT)
Dept: LAB | Facility: HOSPITAL | Age: 85
End: 2020-10-27
Attending: INTERNAL MEDICINE
Payer: MEDICARE

## 2020-10-27 DIAGNOSIS — Z13.220 ENCOUNTER FOR SCREENING FOR LIPOID DISORDERS: ICD-10-CM

## 2020-10-27 DIAGNOSIS — E03.9 HYPOTHYROIDISM, UNSPECIFIED: ICD-10-CM

## 2020-10-27 DIAGNOSIS — Z13.89 ENCOUNTER FOR SCREENING FOR OTHER DISORDER: ICD-10-CM

## 2020-10-27 DIAGNOSIS — E55.9 VITAMIN D DEFICIENCY, UNSPECIFIED: ICD-10-CM

## 2020-10-27 DIAGNOSIS — M80.00XD AGE-RELATED OSTEOPOROSIS WITH CURRENT PATHOLOGICAL FRACTURE, UNSPECIFIED SITE, SUBSEQUENT ENCOUNTER FOR FRACTURE WITH ROUTINE HEALING: ICD-10-CM

## 2020-10-27 DIAGNOSIS — Z13.0 ENCOUNTER FOR SCREENING FOR DISEASES OF THE BLOOD AND BLOOD-FORMING ORGANS AND CERTAIN DISORDERS INVOLVING THE IMMUNE MECHANISM: ICD-10-CM

## 2020-10-27 LAB
ALBUMIN SERPL-MCNC: 4 G/DL (ref 3.4–5)
ALP SERPL-CCNC: 64 IU/L (ref 35–126)
ALT SERPL-CCNC: 18 IU/L (ref 11–54)
ANION GAP SERPL CALC-SCNC: 12 MEQ/L (ref 3–15)
AST SERPL-CCNC: 30 IU/L (ref 15–41)
BASOPHILS # BLD: 0.07 K/UL (ref 0.01–0.1)
BASOPHILS NFR BLD: 1.1 %
BILIRUB SERPL-MCNC: 1.3 MG/DL (ref 0.3–1.2)
BUN SERPL-MCNC: 23 MG/DL (ref 8–20)
CALCIUM SERPL-MCNC: 9.4 MG/DL (ref 8.9–10.3)
CALCIUM SERPL-MCNC: 9.4 MG/DL (ref 8.9–10.3)
CHLORIDE SERPL-SCNC: 105 MEQ/L (ref 98–109)
CHOLEST SERPL-MCNC: 198 MG/DL
CO2 SERPL-SCNC: 23 MEQ/L (ref 22–32)
CREAT SERPL-MCNC: 0.9 MG/DL (ref 0.6–1.1)
DIFFERENTIAL METHOD BLD: ABNORMAL
EOSINOPHIL # BLD: 0.28 K/UL (ref 0.04–0.36)
EOSINOPHIL NFR BLD: 4.6 %
ERYTHROCYTE [DISTWIDTH] IN BLOOD BY AUTOMATED COUNT: 13.5 % (ref 11.7–14.4)
GFR SERPL CREATININE-BSD FRML MDRD: 59.2 ML/MIN/1.73M*2
GLUCOSE SERPL-MCNC: 97 MG/DL (ref 70–99)
HCT VFR BLDCO AUTO: 46.4 % (ref 35–45)
HDLC SERPL-MCNC: 63 MG/DL
HDLC SERPL: 3.1 {RATIO}
HGB BLD-MCNC: 15.1 G/DL (ref 11.8–15.7)
IMM GRANULOCYTES # BLD AUTO: 0.01 K/UL (ref 0–0.08)
IMM GRANULOCYTES NFR BLD AUTO: 0.2 %
LDLC SERPL CALC-MCNC: 119 MG/DL
LYMPHOCYTES # BLD: 2.22 K/UL (ref 1.2–3.5)
LYMPHOCYTES NFR BLD: 36.3 %
MCH RBC QN AUTO: 31.4 PG (ref 28–33.2)
MCHC RBC AUTO-ENTMCNC: 32.5 G/DL (ref 32.2–35.5)
MCV RBC AUTO: 96.5 FL (ref 83–98)
MONOCYTES # BLD: 0.55 K/UL (ref 0.28–0.8)
MONOCYTES NFR BLD: 9 %
NEUTROPHILS # BLD: 2.98 K/UL (ref 1.7–7)
NEUTS SEG NFR BLD: 48.8 %
NONHDLC SERPL-MCNC: 135 MG/DL
NRBC BLD-RTO: 0 %
PDW BLD AUTO: 11.8 FL (ref 9.4–12.3)
PLATELET # BLD AUTO: 189 K/UL (ref 150–369)
POTASSIUM SERPL-SCNC: 3.9 MEQ/L (ref 3.6–5.1)
PROT SERPL-MCNC: 6.1 G/DL (ref 6–8.2)
PTH-INTACT SERPL-MCNC: 24.4 PG/ML (ref 12–88)
RBC # BLD AUTO: 4.81 M/UL (ref 3.93–5.22)
SODIUM SERPL-SCNC: 140 MEQ/L (ref 136–144)
TRIGL SERPL-MCNC: 81 MG/DL (ref 30–149)
TSH SERPL DL<=0.05 MIU/L-ACNC: 2.26 MIU/L (ref 0.34–5.6)
WBC # BLD AUTO: 6.11 K/UL (ref 3.8–10.5)

## 2020-10-27 PROCEDURE — 83970 ASSAY OF PARATHORMONE: CPT | Performed by: INTERNAL MEDICINE

## 2020-10-27 PROCEDURE — 84443 ASSAY THYROID STIM HORMONE: CPT | Performed by: INTERNAL MEDICINE

## 2020-10-27 PROCEDURE — 85025 COMPLETE CBC W/AUTO DIFF WBC: CPT | Performed by: INTERNAL MEDICINE

## 2020-10-27 PROCEDURE — 82652 VIT D 1 25-DIHYDROXY: CPT | Performed by: INTERNAL MEDICINE

## 2020-10-27 PROCEDURE — 80053 COMPREHEN METABOLIC PANEL: CPT | Performed by: INTERNAL MEDICINE

## 2020-10-27 PROCEDURE — 84165 PROTEIN E-PHORESIS SERUM: CPT | Performed by: INTERNAL MEDICINE

## 2020-10-27 PROCEDURE — 80061 LIPID PANEL: CPT | Performed by: INTERNAL MEDICINE

## 2020-10-27 PROCEDURE — 86431 RHEUMATOID FACTOR QUANT: CPT | Performed by: INTERNAL MEDICINE

## 2020-10-27 PROCEDURE — 36415 COLL VENOUS BLD VENIPUNCTURE: CPT | Performed by: INTERNAL MEDICINE

## 2020-10-28 LAB — RHEUMATOID FACT SERPL-ACNC: <7 IU/ML

## 2020-10-29 ENCOUNTER — LAB REQUISITION (OUTPATIENT)
Dept: LAB | Facility: HOSPITAL | Age: 85
End: 2020-10-29
Attending: INTERNAL MEDICINE
Payer: MEDICARE

## 2020-10-29 DIAGNOSIS — M80.00XD AGE-RELATED OSTEOPOROSIS WITH CURRENT PATHOLOGICAL FRACTURE, UNSPECIFIED SITE, SUBSEQUENT ENCOUNTER FOR FRACTURE WITH ROUTINE HEALING: ICD-10-CM

## 2020-10-29 DIAGNOSIS — Z13.220 ENCOUNTER FOR SCREENING FOR LIPOID DISORDERS: ICD-10-CM

## 2020-10-29 DIAGNOSIS — Z13.0 ENCOUNTER FOR SCREENING FOR DISEASES OF THE BLOOD AND BLOOD-FORMING ORGANS AND CERTAIN DISORDERS INVOLVING THE IMMUNE MECHANISM: ICD-10-CM

## 2020-10-29 DIAGNOSIS — E03.9 HYPOTHYROIDISM, UNSPECIFIED: ICD-10-CM

## 2020-10-29 DIAGNOSIS — E55.9 VITAMIN D DEFICIENCY, UNSPECIFIED: ICD-10-CM

## 2020-10-29 DIAGNOSIS — Z13.89 ENCOUNTER FOR SCREENING FOR OTHER DISORDER: ICD-10-CM

## 2020-10-29 LAB
ALBUMIN MFR UR ELPH: 56.2 % (ref 48–62)
ALBUMIN SERPL ELPH-MCNC: 3.43 G/DL (ref 3.2–4.5)
ALBUMIN/GLOB SERPL: 1.3 {RATIO} (ref 1.1–2.1)
ALPHA1 GLOB MFR SERPL ELPH: 4 % (ref 2.1–4.8)
ALPHA1 GLOB SERPL ELPH-MCNC: 0.24 G/DL (ref 0.15–0.32)
ALPHA2 GLOB MFR UR ELPH: 15.1 % (ref 9.7–16.2)
ALPHA2 GLOB SERPL ELPH-MCNC: 0.92 G/DL (ref 0.7–1.1)
B-GLOBULIN SERPL ELPH-MCNC: 0.9 G/DL (ref 0.73–1.3)
BACTERIA URNS QL MICRO: ABNORMAL /HPF
BETA1 GLOB MFR UR ELPH: 14.8 % (ref 10.8–17.9)
BILIRUB UR QL STRIP.AUTO: NEGATIVE MG/DL
CLARITY UR REFRACT.AUTO: CLEAR
COLOR UR AUTO: YELLOW
GAMMA GLOB MFR UR ELPH: 9.9 % (ref 9–23)
GAMMA GLOB SERPL ELPH-MCNC: 0.6 G/DL (ref 0.6–1.6)
GLUCOSE UR STRIP.AUTO-MCNC: NEGATIVE MG/DL
HGB UR QL STRIP.AUTO: NEGATIVE
HYALINE CASTS #/AREA URNS LPF: ABNORMAL /LPF
KETONES UR STRIP.AUTO-MCNC: NEGATIVE MG/DL
LEUKOCYTE ESTERASE UR QL STRIP.AUTO: NEGATIVE
NITRITE UR QL STRIP.AUTO: NEGATIVE
PH UR STRIP.AUTO: 5.5 [PH]
PROT PATTERN SERPL ELPH-IMP: NORMAL
PROT SERPL-MCNC: 6.1 G/DL (ref 6–8.2)
PROT UR QL STRIP.AUTO: NEGATIVE
RBC #/AREA URNS HPF: ABNORMAL /HPF
SP GR UR REFRACT.AUTO: 1.02
SQUAMOUS URNS QL MICRO: ABNORMAL /HPF
UROBILINOGEN UR STRIP-ACNC: 0.2 EU/DL
WBC #/AREA URNS HPF: ABNORMAL /HPF

## 2020-10-29 PROCEDURE — 81001 URINALYSIS AUTO W/SCOPE: CPT | Performed by: INTERNAL MEDICINE

## 2020-10-31 LAB
1,25(OH)2D SERPL-MCNC: 23 PG/ML (ref 18–72)
1,25(OH)2D2 SERPL-MCNC: <8 PG/ML
1,25(OH)2D3 SERPL-MCNC: 23 PG/ML

## 2021-01-06 ENCOUNTER — LAB REQUISITION (OUTPATIENT)
Dept: LAB | Facility: HOSPITAL | Age: 86
End: 2021-01-06
Payer: MEDICARE

## 2021-01-06 DIAGNOSIS — U07.1 COVID-19: ICD-10-CM

## 2021-01-06 PROCEDURE — U0003 INFECTIOUS AGENT DETECTION BY NUCLEIC ACID (DNA OR RNA); SEVERE ACUTE RESPIRATORY SYNDROME CORONAVIRUS 2 (SARS-COV-2) (CORONAVIRUS DISEASE [COVID-19]), AMPLIFIED PROBE TECHNIQUE, MAKING USE OF HIGH THROUGHPUT TECHNOLOGIES AS DESCRIBED BY CMS-2020-01-R: HCPCS | Performed by: NURSE PRACTITIONER

## 2021-01-08 LAB — SARS-COV-2 RNA RESP QL NAA+PROBE: NOT DETECTED

## 2021-01-19 RX ORDER — IBANDRONATE SODIUM 150 MG/1
TABLET, FILM COATED ORAL
Qty: 3 TABLET | Refills: 2 | Status: SHIPPED | OUTPATIENT
Start: 2021-01-19 | End: 2021-09-30

## 2021-01-25 ENCOUNTER — IMMUNIZATION (OUTPATIENT)
Dept: IMMUNIZATION | Facility: CLINIC | Age: 86
End: 2021-01-25

## 2021-02-24 ENCOUNTER — IMMUNIZATION (OUTPATIENT)
Dept: IMMUNIZATION | Facility: CLINIC | Age: 86
End: 2021-02-24
Attending: FAMILY MEDICINE

## 2021-03-26 ENCOUNTER — TRANSCRIBE ORDERS (OUTPATIENT)
Dept: SCHEDULING | Age: 86
End: 2021-03-26

## 2021-03-26 DIAGNOSIS — Z12.31 ENCOUNTER FOR SCREENING MAMMOGRAM FOR MALIGNANT NEOPLASM OF BREAST: ICD-10-CM

## 2021-03-26 DIAGNOSIS — M81.0 AGE-RELATED OSTEOPOROSIS WITHOUT CURRENT PATHOLOGICAL FRACTURE: Primary | ICD-10-CM

## 2021-03-30 ENCOUNTER — HOSPITAL ENCOUNTER (OUTPATIENT)
Dept: RADIOLOGY | Facility: HOSPITAL | Age: 86
Discharge: HOME | End: 2021-03-30
Attending: INTERNAL MEDICINE
Payer: MEDICARE

## 2021-03-30 DIAGNOSIS — Z12.31 ENCOUNTER FOR SCREENING MAMMOGRAM FOR MALIGNANT NEOPLASM OF BREAST: ICD-10-CM

## 2021-03-30 DIAGNOSIS — M81.0 AGE-RELATED OSTEOPOROSIS WITHOUT CURRENT PATHOLOGICAL FRACTURE: ICD-10-CM

## 2021-03-30 PROCEDURE — 77080 DXA BONE DENSITY AXIAL: CPT

## 2021-03-30 PROCEDURE — 77067 SCR MAMMO BI INCL CAD: CPT

## 2021-04-08 ENCOUNTER — DOCUMENTATION (OUTPATIENT)
Dept: INTERNAL MEDICINE | Facility: CLINIC | Age: 86
End: 2021-04-08

## 2021-04-08 PROBLEM — N18.31 CHRONIC KIDNEY DISEASE, STAGE 3A (CMS/HCC): Status: ACTIVE | Noted: 2021-04-08

## 2021-04-08 NOTE — PROGRESS NOTES
Coding Clarification ([facility]) - [facility]  Note       DATE: 2021    RE: Catia Ward  : 1932      Under the direction of Porsha Short DO, the following adjustments were made to this patients Problem List:    New Code: N18.31            Code Description: Chronic kidney disease, stage 3a  Clarification Type EMR System Encounter Type Date of Service Provider   New Code Epic Lab Note 10/27/2020 Porsha Short DO   Rationale: Recent eGFR from Lab on 10/27/2020 is 59.2

## 2021-04-09 ENCOUNTER — OFFICE VISIT (OUTPATIENT)
Dept: INTERNAL MEDICINE | Facility: CLINIC | Age: 86
End: 2021-04-09
Payer: MEDICARE

## 2021-04-09 VITALS
HEIGHT: 62 IN | OXYGEN SATURATION: 96 % | BODY MASS INDEX: 25.43 KG/M2 | SYSTOLIC BLOOD PRESSURE: 122 MMHG | TEMPERATURE: 98.7 F | HEART RATE: 102 BPM | WEIGHT: 138.2 LBS | DIASTOLIC BLOOD PRESSURE: 80 MMHG

## 2021-04-09 DIAGNOSIS — S22.000A COMPRESSION FRACTURE OF BODY OF THORACIC VERTEBRA (CMS/HCC): ICD-10-CM

## 2021-04-09 DIAGNOSIS — I49.49 EXTRASYSTOLE: ICD-10-CM

## 2021-04-09 DIAGNOSIS — M48.50XD WEDGING OF VERTEBRA WITH ROUTINE HEALING, SUBSEQUENT ENCOUNTER: ICD-10-CM

## 2021-04-09 DIAGNOSIS — N18.31 CHRONIC KIDNEY DISEASE, STAGE 3A (CMS/HCC): ICD-10-CM

## 2021-04-09 DIAGNOSIS — Z00.00 MEDICARE ANNUAL WELLNESS VISIT, SUBSEQUENT: Primary | ICD-10-CM

## 2021-04-09 PROCEDURE — 93000 ELECTROCARDIOGRAM COMPLETE: CPT | Performed by: INTERNAL MEDICINE

## 2021-04-09 PROCEDURE — G0439 PPPS, SUBSEQ VISIT: HCPCS | Performed by: INTERNAL MEDICINE

## 2021-04-09 RX ORDER — BETAMETHASONE VALERATE 1 MG/G
CREAM TOPICAL 2 TIMES DAILY
Qty: 45 G | Refills: 1 | Status: SHIPPED | OUTPATIENT
Start: 2021-04-09 | End: 2022-03-23

## 2021-04-09 ASSESSMENT — ENCOUNTER SYMPTOMS
FREQUENCY: 0
ABDOMINAL DISTENTION: 0
UNEXPECTED WEIGHT CHANGE: 0
MYALGIAS: 0
ACTIVITY CHANGE: 0
CHEST TIGHTNESS: 0
DIARRHEA: 0
HEMATURIA: 0
COUGH: 0
BLOOD IN STOOL: 0
ABDOMINAL PAIN: 0
FEVER: 0
DYSURIA: 0
DIAPHORESIS: 0
FATIGUE: 0
NAUSEA: 0
BRUISES/BLEEDS EASILY: 0
WHEEZING: 0
NERVOUS/ANXIOUS: 0
PALPITATIONS: 0
DIZZINESS: 0
DECREASED CONCENTRATION: 0
CHILLS: 0
SORE THROAT: 0
DYSPHORIC MOOD: 0
HEADACHES: 0
APPETITE CHANGE: 0
ARTHRALGIAS: 0
JOINT SWELLING: 0
BACK PAIN: 1
SHORTNESS OF BREATH: 0
RHINORRHEA: 0
CONSTIPATION: 0

## 2021-04-09 ASSESSMENT — MINI COG
TOTAL SCORE: 5
COMPLETED: YES

## 2021-04-09 ASSESSMENT — PATIENT HEALTH QUESTIONNAIRE - PHQ9: SUM OF ALL RESPONSES TO PHQ9 QUESTIONS 1 & 2: 0

## 2021-04-09 NOTE — PROGRESS NOTES
Subjective     Catia Ward is a 88 y.o. female who presents for a subsequent annual wellness visit.     Patient Care Team:  Porsha Short DO as PCP - General (Internal Medicine)    Comprehensive Medical and Social History  Patient Active Problem List   Diagnosis   • Atopic keratoconjunctivitis   • Cicatricial entropion of both upper eyelids   • Cicatricial trichiasis   • Cornea ulcer, left   • Cystoid macular edema, right   • Epiretinal membrane, right eye   • Keratoconus of both eyes   • Pseudophakia, both eyes   • PVD (posterior vitreous detachment), right eye   • Wedging of vertebra (CMS/[facility])   • Compression fracture of body of thoracic vertebra (CMS/[facility])   • Age-related osteoporosis with current pathological fracture   • Hypothyroidism   • Chronic kidney disease, stage 3a (CMS/[facility])     Past Medical History:   Diagnosis Date   • Femur fracture (CMS/[facility])    • Hypothyroidism    • S/P knee replacement 2012   • Shoulder fracture, left      Past Surgical History:   Procedure Laterality Date   • ADENOIDECTOMY     • JOINT REPLACEMENT     • TONSILLECTOMY       Allergies   Allergen Reactions   • Codeine Nausea And Vomiting   • Gentamicin GI intolerance   • Gentamicin Sulfate GI intolerance     Eye redness     Current Outpatient Medications   Medication Sig Dispense Refill   • calcium carbonate-vitamin D3 500 mg(1,250mg) -200 unit powder in packet Take by mouth.     • fluorometholone (FML) 0.1 % ophthalmic suspension      • hydrocortisone 2.5 % ointment      • ibandronate (BONIVA) 150 mg tablet TAKE 1 TABLET EVERY 30 DAYS. TAKE IN THE MORNING WITH GLASS OF WATER PRIOR TO FOOD, DO NOT LIE DOWN FOR 30 MINUTES. 3 tablet 2   • polyvinyl alcohol (LIQUIFILM TEARS) 1.4 % ophthalmic solution 1 drop.     • raloxifene (EVISTA) 60 mg tablet TAKE 1 TABLET DAILY 90 tablet 0   • SYNTHROID 75 mcg tablet TAKE ONE TABLET BY MOUTH ONE TIME DAILY  90 tablet 3   • betamethasone valerate (VALISONE) 0.1 % cream Apply topically 2 (two) times  "a day. 45 g 1     No current facility-administered medications for this visit.     Social History     Tobacco Use   • Smoking status: Never Smoker   • Smokeless tobacco: Never Used   Substance Use Topics   • Alcohol use: Yes     Comment: social   • Drug use: No     History reviewed. No pertinent family history.    Objective   Vitals  Vitals:    04/09/21 1132   BP: 122/80   BP Location: Right upper arm   Patient Position: Sitting   Pulse: (!) 102   Temp: 37.1 °C (98.7 °F)   TempSrc: Temporal   SpO2: 96%   Weight: 62.7 kg (138 lb 3.2 oz)   Height: 1.575 m (5' 2\")     Body mass index is 25.28 kg/m².    Advanced Care Plan  Does patient have advance directive?:  (patient not sure)                                     PHQ  Will the patient answer the depression questions?: Yes   Little interest or pleasure in doing things: Not at all   Feeling down, depressed, or hopeless: Not at all   Depression Risk: 0                                             Mini Cog  Completed: Yes  Score: 5  Result: Negative      Get Up and Go  Result: Pass    STEADI Falls Risk  One or more falls in the last year: No                                           Worried about falling: Yes (because of vision)                 Hearing and Vision Screening   Hearing Screening    125Hz 250Hz 500Hz 1000Hz 2000Hz 3000Hz 4000Hz 6000Hz 8000Hz   Right ear:            Left ear:            Comments: Pt wears an hearing aid    Vision Screening Comments: Pt sees an eye doctor    See HRA for relevant hearing screening response.    Assessment/Plan   Diagnoses and all orders for this visit:    Chronic kidney disease, stage 3a (CMS/HCC) (Primary)    Wedging of vertebra with routine healing, subsequent encounter    Compression fracture of body of thoracic vertebra (CMS/HCC)    Extrasystole  -     ECG 12 lead    Other orders  -     betamethasone valerate (VALISONE) 0.1 % cream; Apply topically 2 (two) times a day.        See Patient Instructions (the written plan) which " was given to the patient for PPPS and health risk factors with interventions.   The following have been reviewed and updated as appropriate in this visit:  Tobacco  Allergies  Meds  Problems  Med Hx  Surg Hx  Fam Hx  Soc Hx        Subjective    MAW  Patient ID: Catia Ward is a 88 y.o. female.    HPI    Patient presents for a subsequent medicare annual wellness examination. Patient complains of ongoing back pain from thoracic compression fracture one year ago. She is able to walk a fair distance now before getting tired and needing rest. Patient exercises regularly on a seated elliptical exercise machine. Patient complains of red marks/rash on arms. Patient complains of tingling sensation of foot. Patient complains of a burning sensation of labia.        Allergies   Allergen Reactions   • Codeine Nausea And Vomiting   • Gentamicin GI intolerance   • Gentamicin Sulfate GI intolerance     Eye redness       Current Outpatient Medications   Medication Sig Dispense Refill   • calcium carbonate-vitamin D3 500 mg(1,250mg) -200 unit powder in packet Take by mouth.     • fluorometholone (FML) 0.1 % ophthalmic suspension      • hydrocortisone 2.5 % ointment      • ibandronate (BONIVA) 150 mg tablet TAKE 1 TABLET EVERY 30 DAYS. TAKE IN THE MORNING WITH GLASS OF WATER PRIOR TO FOOD, DO NOT LIE DOWN FOR 30 MINUTES. 3 tablet 2   • polyvinyl alcohol (LIQUIFILM TEARS) 1.4 % ophthalmic solution 1 drop.     • raloxifene (EVISTA) 60 mg tablet TAKE 1 TABLET DAILY 90 tablet 0   • SYNTHROID 75 mcg tablet TAKE ONE TABLET BY MOUTH ONE TIME DAILY  90 tablet 3   • betamethasone valerate (VALISONE) 0.1 % cream Apply topically 2 (two) times a day. 45 g 1     No current facility-administered medications for this visit.       Past Medical History:   Diagnosis Date   • Femur fracture (CMS/[facility])    • Hypothyroidism    • S/P knee replacement 2012   • Shoulder fracture, left      History reviewed. No pertinent family history.  Social History      Socioeconomic History   • Marital status:      Spouse name: Not on file   • Number of children: Not on file   • Years of education: Not on file   • Highest education level: Not on file   Occupational History   • Not on file   Tobacco Use   • Smoking status: Never Smoker   • Smokeless tobacco: Never Used   Substance and Sexual Activity   • Alcohol use: Yes     Comment: social   • Drug use: No   • Sexual activity: Defer   Other Topics Concern   • Not on file   Social History Narrative   • Not on file     Social Determinants of Health     Financial Resource Strain:    • Difficulty of Paying Living Expenses:    Food Insecurity:    • Worried About Running Out of Food in the Last Year:    • Ran Out of Food in the Last Year:    Transportation Needs:    • Lack of Transportation (Medical):    • Lack of Transportation (Non-Medical):    Physical Activity:    • Days of Exercise per Week:    • Minutes of Exercise per Session:    Stress:    • Feeling of Stress :    Social Connections:    • Frequency of Communication with Friends and Family:    • Frequency of Social Gatherings with Friends and Family:    • Attends Confucianism Services:    • Active Member of Clubs or Organizations:    • Attends Club or Organization Meetings:    • Marital Status:    Intimate Partner Violence:    • Fear of Current or Ex-Partner:    • Emotionally Abused:    • Physically Abused:    • Sexually Abused:        Review of Systems   Constitutional: Negative for activity change, appetite change, chills, diaphoresis, fatigue, fever and unexpected weight change.   HENT: Negative for congestion, ear pain, postnasal drip, rhinorrhea and sore throat.    Eyes: Negative for visual disturbance.   Respiratory: Negative for cough, chest tightness, shortness of breath and wheezing.    Cardiovascular: Negative for chest pain, palpitations and leg swelling.   Gastrointestinal: Negative for abdominal distention, abdominal pain, blood in stool, constipation,  "diarrhea and nausea.   Genitourinary: Negative for dysuria, frequency, hematuria and urgency.        Burning sensation of labia   Musculoskeletal: Positive for back pain. Negative for arthralgias, joint swelling and myalgias.   Skin: Positive for rash (arms).   Neurological: Negative for dizziness and headaches.        Tingling sensation in feet   Hematological: Does not bruise/bleed easily.   Psychiatric/Behavioral: Negative for decreased concentration and dysphoric mood. The patient is not nervous/anxious.        Objective     Visit Vitals  /80 (BP Location: Right upper arm, Patient Position: Sitting)   Pulse (!) 102   Temp 37.1 °C (98.7 °F) (Temporal)   Ht 1.575 m (5' 2\")   Wt 62.7 kg (138 lb 3.2 oz)   SpO2 96%   BMI 25.28 kg/m²     BP Readings from Last 3 Encounters:   04/09/21 122/80   08/28/20 130/82   01/31/20 122/82     Wt Readings from Last 3 Encounters:   04/09/21 62.7 kg (138 lb 3.2 oz)   08/28/20 63.6 kg (140 lb 3.2 oz)   01/31/20 60.5 kg (133 lb 6.4 oz)       Physical Exam  Vitals signs and nursing note reviewed.   Constitutional:       General: She is not in acute distress.     Appearance: She is well-developed.   HENT:      Head: Normocephalic and atraumatic.      Right Ear: External ear normal.      Left Ear: External ear normal.      Mouth/Throat:      Pharynx: No oropharyngeal exudate.   Eyes:      Pupils: Pupils are equal, round, and reactive to light.   Neck:      Thyroid: No thyromegaly.      Vascular: No JVD.      Trachea: No tracheal deviation.   Cardiovascular:      Rate and Rhythm: Normal rate and regular rhythm.  Extrasystoles (rare) are present.     Pulses: Normal pulses.           Carotid pulses are 2+ on the right side and 2+ on the left side.       Dorsalis pedis pulses are 2+ on the right side and 2+ on the left side.        Posterior tibial pulses are 2+ on the right side and 2+ on the left side.      Heart sounds: Normal heart sounds, S1 normal and S2 normal. No murmur. No S3 " or S4 sounds.    Pulmonary:      Effort: Pulmonary effort is normal. No respiratory distress.      Breath sounds: Normal breath sounds. No stridor. No wheezing or rales.   Abdominal:      General: Bowel sounds are normal. There is no distension.      Palpations: Abdomen is soft. There is no mass.      Tenderness: There is no abdominal tenderness. There is no guarding.   Musculoskeletal: Normal range of motion.   Lymphadenopathy:      Cervical: No cervical adenopathy.   Skin:     General: Skin is warm and dry.      Findings: Rash (Nummular eczema - arms) present.      Comments: Actinic keratoses   Neurological:      Mental Status: She is alert and oriented to person, place, and time.      Comments: Vibratory sense normal bilateral lower extremities   Psychiatric:         Behavior: Behavior normal.         Lab Results   Component Value Date    WBC 6.11 10/27/2020    HGB 15.1 10/27/2020    HCT 46.4 (H) 10/27/2020    MCV 96.5 10/27/2020     10/27/2020     Lab Results   Component Value Date    GLUCOSE 97 10/27/2020    CALCIUM 9.4 10/27/2020    CALCIUM 9.4 10/27/2020     10/27/2020    K 3.9 10/27/2020    CO2 23 10/27/2020     10/27/2020    BUN 23 (H) 10/27/2020    CREATININE 0.9 10/27/2020         Assessment/Plan   Problem List Items Addressed This Visit        Genitourinary    Chronic kidney disease, stage 3a (CMS/HCC) - Primary       Musculoskeletal    Wedging of vertebra (CMS/HCC)    Compression fracture of body of thoracic vertebra (CMS/HCC)      Other Visit Diagnoses     Extrasystole        Relevant Orders    ECG 12 lead (Completed)        1. Medicare annual wellness visit, subsequent    MA W completed today.  All categories of the Medicare annual well addressed, and entered in the EMR.  Please see attachments.    Patient has aged out of screenings.        2. Chronic kidney disease, stage 3a (CMS/HCC)  GFR stable at 59.2.    3. Wedging of vertebra with routine healing, subsequent encounter  Patient  has improved dramatically since I first met her.  She has had compression fracture.  She still gets pain at times.  However it is manageable.  She is walking the halls again where she lives.  Her unit is the furthest from the dining area.  She is going down to the dining room for her meals.  Overall, much improved.    4. Compression fracture of body of thoracic vertebra (CMS/HCC)  Patient is on both Evista and Boniva.  We discussed calcium and vitamin D supplementation.  She will be on 1200 mg of elemental calcium, either from diet or from a tablet.  She will also be sure to take 2000 international units of vitamin D.  She is not taking any currently.  It is reflected by her value of 23 in October.  TSH 2.26.  Do not want iatrogenic hypothyroid, as that will also stuart bone.  Patient has been ambulating through her hallways.    5. Extrasystole  - ECG 12 lead          By signing my name below, I, Martha Talley, attest that this documentation has been prepared under the direction and in the presence of Porsha Short DO  4/14/2021 7:57 PM  Porsha Short DO  4/14/2021     I, Porsha Short, personally performed the services described in this documentation, as documented by the scribe in my presence, and it is both accurate and complete.  4/14/2021 8:03 PM

## 2021-04-12 ENCOUNTER — TELEPHONE (OUTPATIENT)
Dept: INTERNAL MEDICINE | Facility: CLINIC | Age: 86
End: 2021-04-12

## 2021-04-12 NOTE — TELEPHONE ENCOUNTER
Pt calling she was reviewing her summary and wants to clarify her instructions as discussed at ov on Friday     Pt was  taking Caltrate  1200 of calcium and 600 mg  Vit D    My understanding was to increase the calcium not the Vit D that is what I thought doctor said     Pt stated she was not aware of   chronic kidney disease stage 3 A and was not discussed as was on pt's summary       Please advise p/c  [phone]

## 2021-04-14 NOTE — TELEPHONE ENCOUNTER
Kim making f/u for mom/pt  Re summary from appt on Friday that she has chronic kidney stage 3 A disease    pt was not aware  pt upset     P/c pt [phone]

## 2021-04-14 NOTE — TELEPHONE ENCOUNTER
Please let PT know she should be taking 2,000 units of vitamin D and should get 1,2000 mg of calcium from dietary choices or supplement.    CKD stage 3a is a very common finding as people age because their kidneys do not work quite as well as they did previously (THIS IS NORMAL WITH AGING). Her kidney function is still VERY GOOD and is barely into CKD stage 3 range. She does not need to worry about it - her kidney function has been stable and quite good over the last few years. It is simply a medical diagnosis we have to have in her chart to reflect that we know her kidneys aren't working as quickly as those of a 27 year old.

## 2021-05-27 RX ORDER — RALOXIFENE HYDROCHLORIDE 60 MG/1
TABLET, FILM COATED ORAL
Qty: 90 TABLET | Refills: 0 | Status: SHIPPED | OUTPATIENT
Start: 2021-05-27 | End: 2021-09-30

## 2021-07-26 ENCOUNTER — OFFICE VISIT (OUTPATIENT)
Dept: INTERNAL MEDICINE | Facility: CLINIC | Age: 86
End: 2021-07-26
Payer: MEDICARE

## 2021-07-26 VITALS
HEART RATE: 72 BPM | SYSTOLIC BLOOD PRESSURE: 122 MMHG | HEIGHT: 62 IN | TEMPERATURE: 98.2 F | DIASTOLIC BLOOD PRESSURE: 72 MMHG | OXYGEN SATURATION: 96 % | WEIGHT: 136.6 LBS | BODY MASS INDEX: 25.14 KG/M2

## 2021-07-26 DIAGNOSIS — H83.09 LABYRINTHITIS, UNSPECIFIED LATERALITY: Primary | ICD-10-CM

## 2021-07-26 DIAGNOSIS — H18.603 KERATOCONUS OF BOTH EYES: ICD-10-CM

## 2021-07-26 DIAGNOSIS — R01.1 MURMUR, CARDIAC: ICD-10-CM

## 2021-07-26 DIAGNOSIS — M80.00XD AGE-RELATED OSTEOPOROSIS WITH CURRENT PATHOLOGICAL FRACTURE WITH ROUTINE HEALING, SUBSEQUENT ENCOUNTER: ICD-10-CM

## 2021-07-26 DIAGNOSIS — S22.000A COMPRESSION FRACTURE OF BODY OF THORACIC VERTEBRA (CMS/HCC): ICD-10-CM

## 2021-07-26 DIAGNOSIS — N18.31 CHRONIC KIDNEY DISEASE, STAGE 3A (CMS/HCC): ICD-10-CM

## 2021-07-26 DIAGNOSIS — M48.50XD WEDGING OF VERTEBRA WITH ROUTINE HEALING, SUBSEQUENT ENCOUNTER: ICD-10-CM

## 2021-07-26 DIAGNOSIS — E03.9 HYPOTHYROIDISM, UNSPECIFIED TYPE: ICD-10-CM

## 2021-07-26 PROCEDURE — 99214 OFFICE O/P EST MOD 30 MIN: CPT | Performed by: INTERNAL MEDICINE

## 2021-07-26 RX ORDER — MOXIFLOXACIN 5 MG/ML
SOLUTION/ DROPS OPHTHALMIC
COMMUNITY
Start: 2021-06-03 | End: 2022-03-23

## 2021-07-26 RX ORDER — BACITRACIN ZINC AND POLYMYXIN B SULFATE 500; 10000 [USP'U]/G; [USP'U]/G
1 OINTMENT OPHTHALMIC NIGHTLY
COMMUNITY
Start: 2021-06-22 | End: 2023-07-20 | Stop reason: ALTCHOICE

## 2021-07-26 RX ORDER — PREDNISOLONE ACETATE 10 MG/ML
SUSPENSION/ DROPS OPHTHALMIC
COMMUNITY
Start: 2021-07-17 | End: 2022-03-23

## 2021-07-26 ASSESSMENT — ENCOUNTER SYMPTOMS
SLEEP DISTURBANCE: 0
SHORTNESS OF BREATH: 0
UNEXPECTED WEIGHT CHANGE: 0
DIAPHORESIS: 0
DYSURIA: 0
DIARRHEA: 0
HEADACHES: 0
FREQUENCY: 0
DYSPHORIC MOOD: 0
RHINORRHEA: 0
ACTIVITY CHANGE: 0
HEMATURIA: 0
WHEEZING: 0
JOINT SWELLING: 0
SORE THROAT: 0
BLOOD IN STOOL: 0
BRUISES/BLEEDS EASILY: 0
DIZZINESS: 1
CONSTIPATION: 0
APPETITE CHANGE: 0
COUGH: 0
FATIGUE: 0
MYALGIAS: 0
NERVOUS/ANXIOUS: 0
DECREASED CONCENTRATION: 0
PALPITATIONS: 0
NAUSEA: 1
ARTHRALGIAS: 0
ABDOMINAL PAIN: 0
FEVER: 0
CHILLS: 0
VOMITING: 0
BACK PAIN: 0
ABDOMINAL DISTENTION: 0
CHEST TIGHTNESS: 0

## 2021-07-26 NOTE — PROGRESS NOTES
Subjective    Follow Up  Patient ID: Catia Ward is a 88 y.o. female.    HPI   Patient reports of dizziness with episodes that occur every 5-7 years. She notes last episode was 1-2 weeks ago and lasts 2-3 days. She denies over spinning, light headedness, and headaches. She notes nausea with each episode and she needs the feel to hold on. She also notes its positional with standing. Administering bonine has been helpful. Patient also notes of visual changes with  decreases visual acuity and signifcant conjunctiva irritation     The following have been reviewed and updated as appropriate in this visit:         Allergies   Allergen Reactions   • Codeine Nausea And Vomiting   • Gentamicin GI intolerance   • Gentamicin Sulfate GI intolerance     Eye redness       Current Outpatient Medications   Medication Sig Dispense Refill   • bacitracin-polymyxin B (POLYSPORIN) ophthalmic ointment      • calcium carbonate-vitamin D3 500 mg(1,250mg) -200 unit powder in packet Take by mouth.     • fluorometholone (FML) 0.1 % ophthalmic suspension      • hydrocortisone 2.5 % ointment      • ibandronate (BONIVA) 150 mg tablet TAKE 1 TABLET EVERY 30 DAYS. TAKE IN THE MORNING WITH GLASS OF WATER PRIOR TO FOOD, DO NOT LIE DOWN FOR 30 MINUTES. 3 tablet 2   • moxifloxacin (VIGAMOX) 0.5 % ophthalmic solution INSTILL ONE DROP INTO LEFT EYE FOUR TIMES DAILY     • polyvinyl alcohol (LIQUIFILM TEARS) 1.4 % ophthalmic solution 1 drop.     • prednisoLONE acetate (PRED FORTE) 1 % ophthalmic suspension      • raloxifene (EVISTA) 60 mg tablet TAKE 1 TABLET DAILY 90 tablet 0   • SYNTHROID 75 mcg tablet TAKE ONE TABLET BY MOUTH ONE TIME DAILY  90 tablet 3   • betamethasone valerate (VALISONE) 0.1 % cream Apply topically 2 (two) times a day. 45 g 1     No current facility-administered medications for this visit.       Past Medical History:   Diagnosis Date   • Femur fracture (CMS/[facility])    • Hypothyroidism    • S/P knee replacement 2012   • Shoulder  fracture, left      No family history on file.  Social History     Socioeconomic History   • Marital status:      Spouse name: Not on file   • Number of children: Not on file   • Years of education: Not on file   • Highest education level: Not on file   Occupational History   • Not on file   Tobacco Use   • Smoking status: Never Smoker   • Smokeless tobacco: Never Used   Substance and Sexual Activity   • Alcohol use: Yes     Comment: social   • Drug use: No   • Sexual activity: Defer   Other Topics Concern   • Not on file   Social History Narrative   • Not on file     Social Determinants of Health     Financial Resource Strain:    • Difficulty of Paying Living Expenses:    Food Insecurity:    • Worried About Running Out of Food in the Last Year:    • Ran Out of Food in the Last Year:    Transportation Needs:    • Lack of Transportation (Medical):    • Lack of Transportation (Non-Medical):    Physical Activity:    • Days of Exercise per Week:    • Minutes of Exercise per Session:    Stress:    • Feeling of Stress :    Social Connections:    • Frequency of Communication with Friends and Family:    • Frequency of Social Gatherings with Friends and Family:    • Attends L[employer] Services:    • Active Member of Clubs or Organizations:    • Attends Club or Organization Meetings:    • Marital Status:    Intimate Partner Violence:    • Fear of Current or Ex-Partner:    • Emotionally Abused:    • Physically Abused:    • Sexually Abused:        Review of Systems   Constitutional: Negative for activity change, appetite change, chills, diaphoresis, fatigue, fever and unexpected weight change.   HENT: Negative for congestion, ear pain, postnasal drip, rhinorrhea and sore throat.    Eyes: Positive for visual disturbance (decreases in visual acuity and signifcant conjunctiva irritation ).   Respiratory: Negative for cough, chest tightness, shortness of breath and wheezing.    Cardiovascular: Negative for chest pain,  "palpitations and leg swelling.   Gastrointestinal: Positive for nausea. Negative for abdominal distention, abdominal pain, blood in stool, constipation, diarrhea and vomiting.   Genitourinary: Negative for dysuria, frequency, genital sores, hematuria and urgency.   Musculoskeletal: Negative for arthralgias, back pain, joint swelling and myalgias.   Skin: Negative for rash.   Neurological: Positive for dizziness. Negative for headaches.   Hematological: Does not bruise/bleed easily.   Psychiatric/Behavioral: Negative for decreased concentration, dysphoric mood and sleep disturbance. The patient is not nervous/anxious.        Objective     Visit Vitals  /72 (BP Location: Right upper arm, Patient Position: Sitting)   Pulse 72   Temp 36.8 °C (98.2 °F) (Oral)   Ht 1.575 m (5' 2\")   Wt 62 kg (136 lb 9.6 oz)   SpO2 96%   BMI 24.98 kg/m²     BP Readings from Last 3 Encounters:   07/26/21 122/72   04/09/21 122/80   08/28/20 130/82     Wt Readings from Last 3 Encounters:   07/26/21 62 kg (136 lb 9.6 oz)   04/09/21 62.7 kg (138 lb 3.2 oz)   08/28/20 63.6 kg (140 lb 3.2 oz)       Physical Exam  Constitutional:       General: She is not in acute distress.     Appearance: Normal appearance. She is well-developed.   HENT:      Head: Normocephalic and atraumatic.      Right Ear: External ear normal.      Left Ear: External ear normal.      Mouth/Throat:      Pharynx: No oropharyngeal exudate.   Eyes:      Pupils: Pupils are equal, round, and reactive to light.      Comments: conjunctiva injected    Neck:      Thyroid: No thyromegaly.      Vascular: No JVD.      Trachea: No tracheal deviation.   Cardiovascular:      Rate and Rhythm: Normal rate and regular rhythm.      Pulses: Normal pulses.           Carotid pulses are 2+ on the right side and 2+ on the left side.       Dorsalis pedis pulses are 2+ on the right side and 2+ on the left side.        Posterior tibial pulses are 2+ on the right side and 2+ on the left side.      " Heart sounds: S1 normal and S2 normal. Murmur (heard best at the right upper sternal border referring to the carotids ) heard.   Systolic murmur is present with a grade of 2/6.   No S3 or S4 sounds.       Comments: Good carotid upstroke bilaterally   Pulmonary:      Effort: No respiratory distress.      Breath sounds: No stridor. No decreased breath sounds, wheezing, rhonchi or rales.   Abdominal:      General: Bowel sounds are normal. There is no abdominal bruit.      Palpations: Abdomen is soft.      Tenderness: There is no abdominal tenderness.   Lymphadenopathy:      Cervical: No cervical adenopathy.      Right cervical: No superficial or posterior cervical adenopathy.     Left cervical: No superficial or posterior cervical adenopathy.      Upper Body:      Right upper body: No supraclavicular adenopathy.      Left upper body: No supraclavicular adenopathy.   Skin:     General: Skin is warm and dry.      Findings: No rash.   Neurological:      Mental Status: She is alert and oriented to person, place, and time.      Deep Tendon Reflexes:      Reflex Scores:       Patellar reflexes are 2+ on the right side and 2+ on the left side.     Comments: Cranial nerves II through XII grossly intact, motor +5/5 bilaterally upper extremity and lower extremity, cerebellar without ataxia finger to nose, heel to shin.  Deep tendon reflexes 2+ bilateral upper extremity and lower extremity, negative romberg   Psychiatric:         Speech: Speech normal.         Behavior: Behavior normal.         Lab Results   Component Value Date    WBC 6.11 10/27/2020    HGB 15.1 10/27/2020    HCT 46.4 (H) 10/27/2020    MCV 96.5 10/27/2020     10/27/2020     Lab Results   Component Value Date    GLUCOSE 97 10/27/2020    CALCIUM 9.4 10/27/2020    CALCIUM 9.4 10/27/2020     10/27/2020    K 3.9 10/27/2020    CO2 23 10/27/2020     10/27/2020    BUN 23 (H) 10/27/2020    CREATININE 0.9 10/27/2020         Assessment/Plan   Problem  List Items Addressed This Visit        Nervous    Keratoconus of both eyes    Relevant Medications    prednisoLONE acetate (PRED FORTE) 1 % ophthalmic suspension    moxifloxacin (VIGAMOX) 0.5 % ophthalmic solution    bacitracin-polymyxin B (POLYSPORIN) ophthalmic ointment       Genitourinary    Chronic kidney disease, stage 3a (CMS/HCC)       Musculoskeletal    Wedging of vertebra (CMS/HCC)    Compression fracture of body of thoracic vertebra (CMS/HCC)    Age-related osteoporosis with current pathological fracture       Endocrine/Metabolic    Hypothyroidism      Other Visit Diagnoses     Labyrinthitis, unspecified laterality    -  Primary    Murmur, cardiac        Relevant Orders    Transthoracic echo (TTE) complete        1. Labyrinthitis, unspecified laterality  Pt with labyrinthitis.  It is not new.  She describes that it is a recurrent thing every several years.  The last time she recalls having it was 7 years ago, and then recurred in January, and now it has occurred again.  Neurologic exam is nonfocal.  She is describing classic labyrinthitis.  She uses an over-the-counter Bonine for her symptoms and they resolve over 2 to 3 days.  She gets nauseous and no vomiting.  Is not spinning sensation.  It is a loss of steadiness in her gait.  We discussed Ménière's disease.  She does have hearing loss.  She has chronic ringing in the ear.  We discussed also possibly her seeing specialist.  However, for the now, her symptoms are manageable.    2. Murmur, cardiac  Murmur audible.  When I discussed with patient, she began to describe the situation with her son-in-law.  He was recently diagnosed with Marfan's and had to have an aortic valve replacement.  And so when I mention she had a murmur, she became anxious.  It sounds like an aortic sclerotic murmur.  However, we will obtain a baseline echo.  - Transthoracic echo (TTE) complete; Future    3. Chronic kidney disease, stage 3a (CMS/HCC)  Patient's renal patient shows a  GFR of 59.2.  It is stable.    4. Keratoconus of both eyes  Patient has follow-up with Dr. Bridges right after our appointment.  She has a carotid conus of both eyes.  However, they are very injected today, and she has a more colorful discharge from her left eye.  She is going to see him now.    5. Compression fracture of body of thoracic vertebra (CMS/HCC)  Patient is doing well.  Pain is resolved.    6. Wedging of vertebra with routine healing, subsequent encounter  As outlined above.    7. Age-related osteoporosis with current pathological fracture with routine healing, subsequent encounter  Continue the Boniva and the Evista.    8. Hypothyroidism, unspecified type  TSH at 2.26.     I spent 37 minutes on this date of service performing the following activities: obtaining history, performing examination, entering orders, documenting, preparing for visit and providing counseling and education.      Porsha Short,   7/26/2021

## 2021-08-18 ENCOUNTER — HOSPITAL ENCOUNTER (OUTPATIENT)
Dept: CARDIOLOGY | Facility: HOSPITAL | Age: 86
Discharge: HOME | End: 2021-08-18
Attending: INTERNAL MEDICINE
Payer: MEDICARE

## 2021-08-18 VITALS
SYSTOLIC BLOOD PRESSURE: 122 MMHG | WEIGHT: 136 LBS | HEIGHT: 62 IN | BODY MASS INDEX: 25.03 KG/M2 | DIASTOLIC BLOOD PRESSURE: 72 MMHG

## 2021-08-18 DIAGNOSIS — R01.1 MURMUR, CARDIAC: ICD-10-CM

## 2021-08-18 LAB
AORTIC ROOT ANNULUS - M-MODE: 3.1 CM
AORTIC VALVE MEAN VELOCITY: 1.36 M/S
AORTIC VALVE VELOCITY TIME INTEGRAL: 34.5 CM
AV MEAN GRADIENT: 9 MMHG
AV PEAK GRADIENT: 15 MMHG
AV PEAK VELOCITY-S: 1.94 M/S
AV REG PEAK VEL: 4.14 M/S
AV REGURGITATION PRESSURE HALF TIME: 433 MS
AV VALVE AREA: 2.2 CM2
BSA FOR ECHO PROCEDURE: 1.64 M2
DOP CALC LVOT STROKE VOLUME: 76 ML
E WAVE DECELERATION TIME: 275 MS
E/A RATIO: 0.5
E/E' RATIO: 6.2
E/LAT E' RATIO: 4.6
EDV (BP): 35.4 CM3
EF (A4C): 66.7 %
EF A2C: 73.6 %
EJECTION FRACTION: 70.3 %
EST RIGHT VENT SYSTOLIC PRESSURE BY TRICUSPID REGURGITATION JET: 23 MMHG
ESV (BP): 10.5 CM3
FRACTIONAL SHORTENING: 39.3 %
INTERVENTRICULAR SEPTUM: 0.83 CM
LA ESV (BP): 30 CM3
LA ESV INDEX (A2C): 21.34 CM3/M2
LA ESV INDEX (BP): 18.29 CM3/M2
LAAS-AP2: 14.7 CM2
LAAS-AP4: 12.6 CM2
LAD 2D: 3.1 CM
LALD A4C: 5.06 CM
LALD A4C: 5.17 CM
LAV-S: 35 CM3
LEFT ATRIUM VOLUME INDEX: 15.49 CM3/M2
LEFT ATRIUM VOLUME: 25.4 CM3
LEFT INTERNAL DIMENSION IN SYSTOLE: 2.64 CM (ref 2.31–3.5)
LEFT VENTRICLE DIASTOLIC VOLUME INDEX: 20.49 CM3/M2
LEFT VENTRICLE DIASTOLIC VOLUME: 33.6 CM3
LEFT VENTRICLE SYSTOLIC VOLUME INDEX: 6.83 CM3/M2
LEFT VENTRICLE SYSTOLIC VOLUME: 11.2 CM3
LEFT VENTRICULAR INTERNAL DIMENSION IN DIASTOLE: 4.35 CM (ref 3.89–5.4)
LEFT VENTRICULAR POSTERIOR WALL IN END DIASTOLE: 0.88 CM (ref 0.5–0.94)
LV DIASTOLIC VOLUME: 37.6 CM3
LV ESV (APICAL 2 CHAMBER): 9.94 CM3
LVAD-AP2: 17.2 CM2
LVAD-AP4: 16.5 CM2
LVAS-AP2: 7.57 CM2
LVAS-AP4: 7.83 CM2
LVEDVI(A2C): 22.93 CM3/M2
LVEDVI(BP): 21.59 CM3/M2
LVESVI(A2C): 6.06 CM3/M2
LVESVI(BP): 6.4 CM3/M2
LVLD-AP2: 6.62 CM
LVLD-AP4: 6.65 CM
LVLS-AP2: 4.9 CM
LVLS-AP4: 4.86 CM
LVOT 2D: 1.7 CM
LVOT A: 2.27 CM2
LVOT MG: 7 MMHG
LVOT MV: 1.22 M/S
LVOT PEAK VELOCITY: 1.72 M/S
LVOT PG: 12 MMHG
LVOT VTI: 33.5 CM
MV E'TISSUE VEL-LAT: 0.1 M/S
MV E'TISSUE VEL-MED: 0.07 M/S
MV MEAN GRADIENT: 2 MMHG
MV PEAK A VEL: 0.89 M/S
MV PEAK E VEL: 0.46 M/S
MV PEAK GRADIENT: 4 MMHG
MV VALVE AREA BY CONTINUITY EQUATION: 2.77 CM2
MV VTI: 27.5 CM
POSTERIOR WALL: 0.88 CM
TR MAX PG: 21 MMHG
TRICUSPID VALVE PEAK REGURGITATION VELOCITY: 2.31 M/S
Z-SCORE OF LEFT VENTRICULAR DIMENSION IN END DIASTOLE: -0.52
Z-SCORE OF LEFT VENTRICULAR DIMENSION IN END SYSTOLE: -0.58
Z-SCORE OF LEFT VENTRICULAR POSTERIOR WALL IN END DIASTOLE: 1.3

## 2021-08-18 PROCEDURE — 93306 TTE W/DOPPLER COMPLETE: CPT

## 2021-09-30 RX ORDER — IBANDRONATE SODIUM 150 MG/1
TABLET, FILM COATED ORAL
Qty: 3 TABLET | Refills: 2 | Status: SHIPPED | OUTPATIENT
Start: 2021-09-30 | End: 2022-07-01

## 2021-09-30 RX ORDER — RALOXIFENE HYDROCHLORIDE 60 MG/1
TABLET, FILM COATED ORAL
Qty: 90 TABLET | Refills: 0 | Status: SHIPPED | OUTPATIENT
Start: 2021-09-30 | End: 2022-01-10 | Stop reason: SDUPTHER

## 2021-10-27 ENCOUNTER — TELEPHONE (OUTPATIENT)
Dept: INTERNAL MEDICINE | Facility: CLINIC | Age: 86
End: 2021-10-27

## 2021-10-27 NOTE — TELEPHONE ENCOUNTER
OK to try generic  If she feels poorly with it we could switch her back  We can check labs in 6 weeks

## 2021-10-27 NOTE — TELEPHONE ENCOUNTER
Pt called just had her levothyroxine 75 mcg filled and noticed that it was generic  ( pt said that her prior physician was having pt take Brand synthroid  )  Pt asking for call back to speak with you to see if ok to take generic  Pt # [phone]

## 2021-11-01 ENCOUNTER — OFFICE VISIT (OUTPATIENT)
Dept: INTERNAL MEDICINE | Facility: CLINIC | Age: 86
End: 2021-11-01
Payer: MEDICARE

## 2021-11-01 ENCOUNTER — HOSPITAL ENCOUNTER (OUTPATIENT)
Dept: RADIOLOGY | Facility: HOSPITAL | Age: 86
Discharge: HOME | End: 2021-11-01
Attending: INTERNAL MEDICINE
Payer: MEDICARE

## 2021-11-01 VITALS
TEMPERATURE: 98.4 F | BODY MASS INDEX: 27.01 KG/M2 | HEART RATE: 69 BPM | SYSTOLIC BLOOD PRESSURE: 120 MMHG | OXYGEN SATURATION: 95 % | WEIGHT: 137.6 LBS | DIASTOLIC BLOOD PRESSURE: 60 MMHG | HEIGHT: 60 IN

## 2021-11-01 DIAGNOSIS — Z13.220 SCREENING, LIPID: ICD-10-CM

## 2021-11-01 DIAGNOSIS — S22.000A COMPRESSION FRACTURE OF BODY OF THORACIC VERTEBRA (CMS/HCC): ICD-10-CM

## 2021-11-01 DIAGNOSIS — N18.31 CHRONIC KIDNEY DISEASE, STAGE 3A (CMS/HCC): ICD-10-CM

## 2021-11-01 DIAGNOSIS — Z13.0 SCREENING, ANEMIA, DEFICIENCY, IRON: ICD-10-CM

## 2021-11-01 DIAGNOSIS — Z13.89 SCREENING FOR HEMATURIA OR PROTEINURIA: ICD-10-CM

## 2021-11-01 DIAGNOSIS — M77.8 DELTOID TENDONITIS, RIGHT: ICD-10-CM

## 2021-11-01 DIAGNOSIS — D69.2 OTHER NONTHROMBOCYTOPENIC PURPURA: ICD-10-CM

## 2021-11-01 DIAGNOSIS — E55.9 VITAMIN D DEFICIENCY: ICD-10-CM

## 2021-11-01 DIAGNOSIS — M80.00XD AGE-RELATED OSTEOPOROSIS WITH CURRENT PATHOLOGICAL FRACTURE WITH ROUTINE HEALING, SUBSEQUENT ENCOUNTER: ICD-10-CM

## 2021-11-01 DIAGNOSIS — E03.9 HYPOTHYROIDISM, UNSPECIFIED TYPE: Primary | ICD-10-CM

## 2021-11-01 PROCEDURE — 73030 X-RAY EXAM OF SHOULDER: CPT | Mod: RT

## 2021-11-01 PROCEDURE — 99214 OFFICE O/P EST MOD 30 MIN: CPT | Performed by: INTERNAL MEDICINE

## 2021-11-01 ASSESSMENT — ENCOUNTER SYMPTOMS
ACTIVITY CHANGE: 0
UNEXPECTED WEIGHT CHANGE: 0
HEMATURIA: 0
CHILLS: 0
DYSPHORIC MOOD: 0
NAUSEA: 0
ABDOMINAL DISTENTION: 0
SORE THROAT: 0
DECREASED CONCENTRATION: 0
PALPITATIONS: 0
DIAPHORESIS: 0
BLOOD IN STOOL: 0
SHORTNESS OF BREATH: 0
BRUISES/BLEEDS EASILY: 0
ARTHRALGIAS: 0
FEVER: 0
DIZZINESS: 0
DYSURIA: 0
SLEEP DISTURBANCE: 0
FREQUENCY: 0
BACK PAIN: 0
CHEST TIGHTNESS: 0
HEADACHES: 0
MYALGIAS: 0
RHINORRHEA: 0
CONSTIPATION: 0
FATIGUE: 0
NERVOUS/ANXIOUS: 0
WHEEZING: 0
DIARRHEA: 0
ABDOMINAL PAIN: 0
JOINT SWELLING: 0
APPETITE CHANGE: 0
VOMITING: 0
COUGH: 0

## 2021-11-01 NOTE — PROGRESS NOTES
Subjective      Follow up      Patient ID: Catia Ward is a 89 y.o. female.    HPI    Compression fracture  Doing well  Much more mobile    Hypothyroidism.  Monitor response to therapy.          The following have been reviewed and updated as appropriate in this visit:         Allergies   Allergen Reactions   • Codeine Nausea And Vomiting   • Gentamicin GI intolerance   • Gentamicin Sulfate GI intolerance     Eye redness       Current Outpatient Medications   Medication Sig Dispense Refill   • fluorometholone (FML) 0.1 % ophthalmic suspension      • ibandronate (BONIVA) 150 mg tablet TAKE 1 TABLET EVERY 30 DAYS. TAKE IN THE MORNING WITH GLASS OF WATER PRIOR TO FOOD, DO NOT LIE DOWN FOR 30 MINUTES. 3 tablet 2   • levothyroxine (SYNTHROID) 75 mcg tablet Take 1 tablet (75 mcg total) by mouth once daily. 90 tablet 1   • polyvinyl alcohol (LIQUIFILM TEARS) 1.4 % ophthalmic solution 1 drop.     • raloxifene (EVISTA) 60 mg tablet TAKE 1 TABLET DAILY 90 tablet 0   • bacitracin-polymyxin B (POLYSPORIN) ophthalmic ointment      • betamethasone valerate (VALISONE) 0.1 % cream Apply topically 2 (two) times a day. 45 g 1   • calcium carbonate-vitamin D3 500 mg(1,250mg) -200 unit powder in packet Take by mouth.     • hydrocortisone 2.5 % ointment      • moxifloxacin (VIGAMOX) 0.5 % ophthalmic solution INSTILL ONE DROP INTO LEFT EYE FOUR TIMES DAILY     • prednisoLONE acetate (PRED FORTE) 1 % ophthalmic suspension        No current facility-administered medications for this visit.       Past Medical History:   Diagnosis Date   • Femur fracture (CMS/[facility])    • Hypothyroidism    • S/P knee replacement 2012   • Shoulder fracture, left      No family history on file.  Social History     Socioeconomic History   • Marital status:      Spouse name: Not on file   • Number of children: Not on file   • Years of education: Not on file   • Highest education level: Not on file   Occupational History   • Not on file   Tobacco Use   •  Smoking status: Never Smoker   • Smokeless tobacco: Never Used   Substance and Sexual Activity   • Alcohol use: Yes     Comment: social   • Drug use: No   • Sexual activity: Defer   Other Topics Concern   • Not on file   Social History Narrative   • Not on file     Social Determinants of Health     Financial Resource Strain:    • Difficulty of Paying Living Expenses: Not on file   Food Insecurity:    • Worried About Running Out of Food in the Last Year: Not on file   • Ran Out of Food in the Last Year: Not on file   Transportation Needs:    • Lack of Transportation (Medical): Not on file   • Lack of Transportation (Non-Medical): Not on file   Physical Activity:    • Days of Exercise per Week: Not on file   • Minutes of Exercise per Session: Not on file   Stress:    • Feeling of Stress : Not on file   Social Connections:    • Frequency of Communication with Friends and Family: Not on file   • Frequency of Social Gatherings with Friends and Family: Not on file   • Attends Jewish Services: Not on file   • Active Member of Clubs or Organizations: Not on file   • Attends Club or Organization Meetings: Not on file   • Marital Status: Not on file   Intimate Partner Violence:    • Fear of Current or Ex-Partner: Not on file   • Emotionally Abused: Not on file   • Physically Abused: Not on file   • Sexually Abused: Not on file   Housing Stability:    • Unable to Pay for Housing in the Last Year: Not on file   • Number of Places Lived in the Last Year: Not on file   • Unstable Housing in the Last Year: Not on file       Review of Systems   Constitutional: Negative for activity change, appetite change, chills, diaphoresis, fatigue, fever and unexpected weight change.   HENT: Negative for congestion, ear pain, postnasal drip, rhinorrhea and sore throat.    Eyes: Negative for visual disturbance.   Respiratory: Negative for cough, chest tightness, shortness of breath and wheezing.    Cardiovascular: Negative for chest pain,  "palpitations and leg swelling.   Gastrointestinal: Negative for abdominal distention, abdominal pain, blood in stool, constipation, diarrhea, nausea and vomiting.   Genitourinary: Negative for dysuria, frequency, hematuria and urgency.   Musculoskeletal: Negative for arthralgias, back pain, joint swelling and myalgias.   Skin: Negative for rash.   Neurological: Negative for dizziness and headaches.   Hematological: Does not bruise/bleed easily.   Psychiatric/Behavioral: Negative for decreased concentration, dysphoric mood and sleep disturbance. The patient is not nervous/anxious.        Objective     Visit Vitals  /60 (BP Location: Right upper arm, Patient Position: Sitting)   Pulse 69   Temp 36.9 °C (98.4 °F) (Oral)   Ht 1.529 m (5' 0.2\")   Wt 62.4 kg (137 lb 9.6 oz)   SpO2 95%   BMI 26.69 kg/m²     BP Readings from Last 3 Encounters:   11/01/21 120/60   08/18/21 122/72   07/26/21 122/72     Wt Readings from Last 3 Encounters:   11/01/21 62.4 kg (137 lb 9.6 oz)   08/18/21 61.7 kg (136 lb)   07/26/21 62 kg (136 lb 9.6 oz)       Physical Exam  Vitals and nursing note reviewed.   Constitutional:       General: She is not in acute distress.     Appearance: Normal appearance. She is well-developed and normal weight.   HENT:      Head: Normocephalic and atraumatic.      Right Ear: Tympanic membrane, ear canal and external ear normal.      Left Ear: Tympanic membrane, ear canal and external ear normal.      Nose: Nose normal.      Mouth/Throat:      Pharynx: No oropharyngeal exudate.   Eyes:      General: No scleral icterus.     Pupils: Pupils are equal, round, and reactive to light.   Neck:      Thyroid: No thyromegaly.      Vascular: No JVD.      Trachea: No tracheal deviation.   Cardiovascular:      Rate and Rhythm: Normal rate and regular rhythm.      Pulses: Normal pulses.           Carotid pulses are 2+ on the right side and 2+ on the left side.       Dorsalis pedis pulses are 2+ on the right side and 2+ on " the left side.        Posterior tibial pulses are 2+ on the right side and 2+ on the left side.      Heart sounds: Normal heart sounds, S1 normal and S2 normal. No murmur heard.  No S3 or S4 sounds.       Comments: Soft murmur    Pulmonary:      Effort: Pulmonary effort is normal. No respiratory distress.      Breath sounds: Normal breath sounds. No stridor. No decreased breath sounds, wheezing, rhonchi or rales.   Chest:   Breasts:      Right: No supraclavicular adenopathy.      Left: No supraclavicular adenopathy.       Abdominal:      General: Bowel sounds are normal. There is no distension or abdominal bruit.      Palpations: Abdomen is soft. There is no mass.      Tenderness: There is no abdominal tenderness.      Hernia: No hernia is present.   Musculoskeletal:         General: No swelling.      Comments: Limited range of motion of the right shoulder with internal and external rotation.  Abduction is also painful.   Lymphadenopathy:      Cervical: No cervical adenopathy.      Right cervical: No superficial or posterior cervical adenopathy.     Left cervical: No superficial or posterior cervical adenopathy.      Upper Body:      Right upper body: No supraclavicular adenopathy.      Left upper body: No supraclavicular adenopathy.   Skin:     General: Skin is warm and dry.      Findings: No rash.   Neurological:      Mental Status: She is alert and oriented to person, place, and time.      Deep Tendon Reflexes:      Reflex Scores:       Patellar reflexes are 2+ on the right side and 2+ on the left side.  Psychiatric:         Speech: Speech normal.         Behavior: Behavior normal.         Lab Results   Component Value Date    WBC 6.11 10/27/2020    HGB 15.1 10/27/2020    HCT 46.4 (H) 10/27/2020    MCV 96.5 10/27/2020     10/27/2020     Lab Results   Component Value Date    GLUCOSE 97 10/27/2020    CALCIUM 9.4 10/27/2020    CALCIUM 9.4 10/27/2020     10/27/2020    K 3.9 10/27/2020    CO2 23  10/27/2020     10/27/2020    BUN 23 (H) 10/27/2020    CREATININE 0.9 10/27/2020         Assessment/Plan   Problem List Items Addressed This Visit        Genitourinary    Chronic kidney disease, stage 3a (CMS/HCC)       Musculoskeletal    Wedging of vertebra (CMS/HCC)    Compression fracture of body of thoracic vertebra (CMS/HCC)    Age-related osteoporosis with current pathological fracture       Endocrine/Metabolic    Hypothyroidism - Primary    Relevant Orders    TSH 3rd Generation       Hematologic    Other nonthrombocytopenic purpura (CMS/HCC)      Other Visit Diagnoses     Screening, anemia, deficiency, iron        Relevant Orders    CBC and Differential    Comprehensive metabolic panel    Screening, lipid        Relevant Orders    Lipid panel    Screening for hematuria or proteinuria        Relevant Orders    Urinalysis with microscopic    Vitamin D deficiency        Relevant Orders    Vitamin D 1,25-Dihydroxy    Deltoid tendonitis, right        Relevant Orders    X-RAY SHOULDER RIGHT 2+ VIEWS (Completed)        1. Hypothyroidism, unspecified type  Clinically euthyroid.  Check TSH.  - TSH 3rd Generation; Future    2. Compression fracture of body of thoracic vertebra (CMS/HCC)  Patient is doing well.  Her pain is manageable.  She does get tired.  She has healed well.    3. Age-related osteoporosis with current pathological fracture with routine healing, subsequent encounter  Patient is on Evista and Boniva.    4. Other nonthrombocytopenic purpura (CMS/HCC)  No recurrence    5. Chronic kidney disease, stage 3a (CMS/HCC)  Stable with most recent GFR at 54.    6. Screening, anemia, deficiency, iron  - CBC and Differential; Future  - Comprehensive metabolic panel; Future    7. Screening, lipid  - Lipid panel; Future    8. Screening for hematuria or proteinuria  - Urinalysis with microscopic; Future    9. Vitamin D deficiency  - Vitamin D 1,25-Dihydroxy; Future    10. Deltoid tendonitis, right    - X-RAY  SHOULDER RIGHT 2+ VIEWS; Future    Porsha Short, DO  11/3/2021

## 2021-11-02 ENCOUNTER — TELEPHONE (OUTPATIENT)
Dept: INTERNAL MEDICINE | Facility: CLINIC | Age: 86
End: 2021-11-02

## 2021-11-02 DIAGNOSIS — M25.511 RIGHT SHOULDER PAIN, UNSPECIFIED CHRONICITY: Primary | ICD-10-CM

## 2021-11-02 NOTE — TELEPHONE ENCOUNTER
Pt calling had xr of R shoulder  Asking for results and pt would like to start PT for [facility]  at [facility]     [facility] pt PT [facility]   [phone]  Fx [phone]       P/c pt when faxed  [phone]

## 2021-11-15 ENCOUNTER — TELEPHONE (OUTPATIENT)
Dept: INTERNAL MEDICINE | Facility: CLINIC | Age: 86
End: 2021-11-15

## 2021-11-15 ENCOUNTER — OFFICE VISIT (OUTPATIENT)
Dept: INTERNAL MEDICINE | Facility: CLINIC | Age: 86
End: 2021-11-15
Payer: MEDICARE

## 2021-11-15 VITALS
BODY MASS INDEX: 26.7 KG/M2 | TEMPERATURE: 98.4 F | HEIGHT: 60 IN | DIASTOLIC BLOOD PRESSURE: 70 MMHG | SYSTOLIC BLOOD PRESSURE: 134 MMHG | OXYGEN SATURATION: 98 % | WEIGHT: 136 LBS | HEART RATE: 87 BPM

## 2021-11-15 DIAGNOSIS — M54.6 ACUTE BILATERAL THORACIC BACK PAIN: ICD-10-CM

## 2021-11-15 DIAGNOSIS — M79.10 MYALGIA: Primary | ICD-10-CM

## 2021-11-15 PROCEDURE — 99214 OFFICE O/P EST MOD 30 MIN: CPT | Performed by: NURSE PRACTITIONER

## 2021-11-15 RX ORDER — VALACYCLOVIR HYDROCHLORIDE 1 G/1
1000 TABLET, FILM COATED ORAL DAILY
COMMUNITY

## 2021-11-15 ASSESSMENT — ENCOUNTER SYMPTOMS
ABDOMINAL PAIN: 0
WHEEZING: 0
DIARRHEA: 0
SORE THROAT: 0
MYALGIAS: 1
FEVER: 1
TROUBLE SWALLOWING: 0
VOMITING: 0
EYE PAIN: 0
NAUSEA: 0
CHILLS: 0
CHEST TIGHTNESS: 0
NECK PAIN: 1
ARTHRALGIAS: 1
NECK STIFFNESS: 0
CONSTIPATION: 0
ABDOMINAL DISTENTION: 0
PALPITATIONS: 0
JOINT SWELLING: 0
COUGH: 0
SHORTNESS OF BREATH: 0

## 2021-11-15 NOTE — PROGRESS NOTES
Subjective     Patient ID: Catia Ward is a 89 y.o. female.    About 4 days ago she started having pain in the back of her neck (thought it was due to having to do steps where she was staying)  Then her L arm started feeling sore  Then her R leg starting hurting  She also reports a fever in the evening (100.4 tmax) one time over the weekend  Both ankles were slightly swollen last night  Ibuprofen does take the edge off the pain, tylenol less so    She had similar symptoms about 5+ years ago  The symptoms resolved spontaneously at that time and now the symptoms have not improved  Blood tests at that time were all WNL      Review of Systems   Constitutional: Positive for fever (now resolved). Negative for chills.   HENT: Negative for ear pain, hearing loss, sore throat and trouble swallowing.    Eyes: Negative for pain and visual disturbance.   Respiratory: Negative for cough, chest tightness, shortness of breath and wheezing.    Cardiovascular: Negative for chest pain, palpitations and leg swelling.   Gastrointestinal: Negative for abdominal distention, abdominal pain, constipation, diarrhea, nausea and vomiting.   Musculoskeletal: Positive for arthralgias, myalgias and neck pain. Negative for gait problem, joint swelling and neck stiffness.       Objective     Vitals:    11/15/21 1516   BP: 134/70   BP Location: Right upper arm   Patient Position: Sitting   Pulse: 87   Temp: 36.9 °C (98.4 °F)   TempSrc: Oral   SpO2: 98%   Weight: 61.7 kg (136 lb)   Height: 1.524 m (5')     Body mass index is 26.56 kg/m².    Physical Exam  Constitutional:       General: She is not in acute distress.     Appearance: She is well-developed. She is not diaphoretic.   HENT:      Head: Normocephalic and atraumatic.   Eyes:      Conjunctiva/sclera: Conjunctivae normal.   Cardiovascular:      Rate and Rhythm: Normal rate and regular rhythm.      Heart sounds: Normal heart sounds. No murmur heard.  No friction rub. No gallop.    Pulmonary:       Effort: Pulmonary effort is normal. No respiratory distress.      Breath sounds: Normal breath sounds. No wheezing or rales.   Musculoskeletal:      Right upper arm: No swelling or tenderness.      Left upper arm: No swelling or tenderness.      Right forearm: No swelling or tenderness.      Left forearm: No swelling or tenderness.      Cervical back: Normal range of motion and neck supple. No rigidity or tenderness.      Thoracic back: Tenderness (midline around T4-T5) present. No spasms.      Right lower leg: No swelling or tenderness.      Left lower leg: No swelling or tenderness.   Skin:     General: Skin is warm and dry.      Coloration: Skin is not pale.      Findings: No erythema.   Neurological:      Mental Status: She is alert and oriented to person, place, and time.   Psychiatric:         Behavior: Behavior normal.         Thought Content: Thought content normal.         Judgment: Judgment normal.         Assessment/Plan   Diagnoses and all orders for this visit:    Myalgia (Primary)  -     C-reactive protein; Future  -     Sedimentation rate; Future  -     CBC and Differential; Future    Acute bilateral thoracic back pain  -     X-RAY THORACIC SPINE 4+ VIEWS; Future      1. Myalgia  She had similar symptoms a few years ago.  She states and work-up at that time was mostly nonrevealing.  Pain is fairly diffuse.  Not associated with injury.  She was spending a week with family last week, it is certainly possible-continue flights of steps every day and being in a different location could have exacerbated some mild pains.  Check labs.  Check x-ray of thoracic spine if symptoms do not improve.  In the meantime, she will take Tylenol or low-dose ibuprofen as needed.  - C-reactive protein; Future  - Sedimentation rate; Future  - CBC and Differential; Future    2. Acute bilateral thoracic back pain  See above.  Check x-ray of T-spine if symptoms not improved.  She does have history of compression fractures.   Reviewed previous imaging of thoracic spine which did show a T11 compression fracture.  - X-RAY THORACIC SPINE 4+ VIEWS; Future    YFN Haas

## 2021-11-15 NOTE — TELEPHONE ENCOUNTER
Pt calling asking to be seen ; I have pain in  first in my neck then  L arm then went  R leg leg ; still has the pain in areas ; can hardly walk ; getting around with a walker   Pt is getting slight temptures in evening ; pt feels frightening scary ; pt taking Ibuprofen and tylenol does helps but pain does not go away   Pt does not know why/how pain came   symptoms started Thursday     please advise P/c pt [phone]

## 2021-11-16 ENCOUNTER — LAB REQUISITION (OUTPATIENT)
Dept: LAB | Facility: HOSPITAL | Age: 86
End: 2021-11-16
Attending: NURSE PRACTITIONER
Payer: MEDICARE

## 2021-11-16 DIAGNOSIS — M79.10 MYALGIA, UNSPECIFIED SITE: ICD-10-CM

## 2021-11-16 LAB
BASOPHILS # BLD: 0.07 K/UL (ref 0.01–0.1)
BASOPHILS NFR BLD: 1 %
CRP SERPL-MCNC: 84.32 MG/L
DIFFERENTIAL METHOD BLD: ABNORMAL
EOSINOPHIL # BLD: 0.2 K/UL (ref 0.04–0.36)
EOSINOPHIL NFR BLD: 3 %
ERYTHROCYTE [DISTWIDTH] IN BLOOD BY AUTOMATED COUNT: 12.6 % (ref 11.7–14.4)
ERYTHROCYTE [SEDIMENTATION RATE] IN BLOOD BY WESTERGREN METHOD: 19 MM/HR
HCT VFR BLDCO AUTO: 41.9 % (ref 35–45)
HGB BLD-MCNC: 13.8 G/DL (ref 11.8–15.7)
IMM GRANULOCYTES # BLD AUTO: 0.02 K/UL (ref 0–0.08)
IMM GRANULOCYTES NFR BLD AUTO: 0.3 %
LYMPHOCYTES # BLD: 1.38 K/UL (ref 1.2–3.5)
LYMPHOCYTES NFR BLD: 20.4 %
MCH RBC QN AUTO: 34.3 PG (ref 28–33.2)
MCHC RBC AUTO-ENTMCNC: 32.9 G/DL (ref 32.2–35.5)
MCV RBC AUTO: 104.2 FL (ref 83–98)
MONOCYTES # BLD: 0.73 K/UL (ref 0.28–0.8)
MONOCYTES NFR BLD: 10.8 %
NEUTROPHILS # BLD: 4.35 K/UL (ref 1.7–7)
NEUTS SEG NFR BLD: 64.5 %
NRBC BLD-RTO: 0 %
PDW BLD AUTO: 11.3 FL (ref 9.4–12.3)
PLATELET # BLD AUTO: 196 K/UL (ref 150–369)
RBC # BLD AUTO: 4.02 M/UL (ref 3.93–5.22)
WBC # BLD AUTO: 6.75 K/UL (ref 3.8–10.5)

## 2021-11-16 PROCEDURE — 85652 RBC SED RATE AUTOMATED: CPT | Performed by: NURSE PRACTITIONER

## 2021-11-16 PROCEDURE — 86140 C-REACTIVE PROTEIN: CPT | Performed by: NURSE PRACTITIONER

## 2021-11-16 PROCEDURE — 85025 COMPLETE CBC W/AUTO DIFF WBC: CPT | Performed by: NURSE PRACTITIONER

## 2021-11-17 ENCOUNTER — TELEPHONE (OUTPATIENT)
Dept: INTERNAL MEDICINE | Facility: CLINIC | Age: 86
End: 2021-11-17

## 2021-11-17 RX ORDER — PREDNISONE 5 MG/1
15 TABLET ORAL DAILY
Qty: 90 TABLET | Refills: 0 | Status: SHIPPED | OUTPATIENT
Start: 2021-11-17 | End: 2022-02-22 | Stop reason: ALTCHOICE

## 2021-11-17 NOTE — TELEPHONE ENCOUNTER
Patient called asking for there results of her labs from yesterday.  Patient can be reached at [phone].  
Spoke with patient's daughter, Kim.    CRP is elevated, CNC WNL, with mild fever and joint pains - concern for polymyalgia rheumatica.  Start tx with prednisone 15mg daily. F/u with rheumatology, recommended Dr Janet Pink.  
n/a

## 2021-11-18 ENCOUNTER — TELEPHONE (OUTPATIENT)
Dept: INTERNAL MEDICINE | Facility: CLINIC | Age: 86
End: 2021-11-18

## 2021-11-18 NOTE — TELEPHONE ENCOUNTER
Pt called asking Litzy to call her to discuss her latest lab results. Also asks to not be called after 4:15 PM she gets her food at that time.

## 2021-11-18 NOTE — TELEPHONE ENCOUNTER
Spoke with patient. She is feeling much better and no longer needs her walker.  She does report temp up to 100.1 over the last day.  She would prefer to not take the prednisone.  She will let us know how she is doing in a few days.

## 2021-11-22 ENCOUNTER — TELEPHONE (OUTPATIENT)
Dept: INTERNAL MEDICINE | Facility: CLINIC | Age: 86
End: 2021-11-22

## 2021-11-22 NOTE — TELEPHONE ENCOUNTER
Spoke with patient.  She did start the prednisone over the weekend which has dramatically helped her pains.  No fever since starting the prednisone.  She will continue the prednisone.  She and her daughter are working on scheduling appointment with rheumatology.  I advised her that if she has not had a rheumatology appointment in 3 weeks, she should call us to see if we need to adjust the prednisone dosing.

## 2021-11-22 NOTE — TELEPHONE ENCOUNTER
Pt calling asking to speak to Litzy fill you in I am having these weird symptoms of pain neck arm low grade temp ; flared on Saturday and  much better from steroid ; what /where to go from here/next?   Pt asking to call before 4 pm     Please advise  P/c pt [phone]

## 2022-01-10 RX ORDER — RALOXIFENE HYDROCHLORIDE 60 MG/1
60 TABLET, FILM COATED ORAL
Qty: 90 TABLET | Refills: 0 | Status: SHIPPED | OUTPATIENT
Start: 2022-01-10 | End: 2022-03-23

## 2022-01-11 RX ORDER — RALOXIFENE HYDROCHLORIDE 60 MG/1
60 TABLET, FILM COATED ORAL
Qty: 90 TABLET | Refills: 0 | Status: CANCELLED | OUTPATIENT
Start: 2022-01-11

## 2022-01-18 ENCOUNTER — LAB REQUISITION (OUTPATIENT)
Dept: LAB | Facility: HOSPITAL | Age: 87
End: 2022-01-18
Attending: INTERNAL MEDICINE
Payer: MEDICARE

## 2022-01-18 DIAGNOSIS — M25.50 PAIN IN UNSPECIFIED JOINT: ICD-10-CM

## 2022-01-18 LAB
25(OH)D3 SERPL-MCNC: 59 NG/ML (ref 30–100)
ALBUMIN SERPL-MCNC: 3.4 G/DL (ref 3.4–5)
ALP SERPL-CCNC: 62 IU/L (ref 35–126)
ALT SERPL-CCNC: 17 IU/L (ref 11–54)
ANION GAP SERPL CALC-SCNC: 9 MEQ/L (ref 3–15)
AST SERPL-CCNC: 28 IU/L (ref 15–41)
BASOPHILS # BLD: 0.09 K/UL (ref 0.01–0.1)
BASOPHILS NFR BLD: 1.4 %
BILIRUB SERPL-MCNC: 0.9 MG/DL (ref 0.3–1.2)
BUN SERPL-MCNC: 19 MG/DL (ref 8–20)
CALCIUM SERPL-MCNC: 9.3 MG/DL (ref 8.9–10.3)
CHLORIDE SERPL-SCNC: 108 MEQ/L (ref 98–109)
CO2 SERPL-SCNC: 23 MEQ/L (ref 22–32)
CREAT SERPL-MCNC: 1 MG/DL (ref 0.6–1.1)
CRP SERPL-MCNC: 6.02 MG/L
DIFFERENTIAL METHOD BLD: ABNORMAL
EOSINOPHIL # BLD: 0.23 K/UL (ref 0.04–0.36)
EOSINOPHIL NFR BLD: 3.6 %
ERYTHROCYTE [DISTWIDTH] IN BLOOD BY AUTOMATED COUNT: 13.9 % (ref 11.7–14.4)
ERYTHROCYTE [SEDIMENTATION RATE] IN BLOOD BY WESTERGREN METHOD: 11 MM/HR
GFR SERPL CREATININE-BSD FRML MDRD: 52.3 ML/MIN/1.73M*2
GLUCOSE SERPL-MCNC: 105 MG/DL (ref 70–99)
HBV SURFACE AB SER QL: NONREACTIVE
HBV SURFACE AG SER QL: NONREACTIVE
HCT VFR BLDCO AUTO: 44.6 % (ref 35–45)
HCV AB SER QL: NONREACTIVE
HGB BLD-MCNC: 14.7 G/DL (ref 11.8–15.7)
IMM GRANULOCYTES # BLD AUTO: 0.02 K/UL (ref 0–0.08)
IMM GRANULOCYTES NFR BLD AUTO: 0.3 %
LYMPHOCYTES # BLD: 2.52 K/UL (ref 1.2–3.5)
LYMPHOCYTES NFR BLD: 39.6 %
MCH RBC QN AUTO: 33.9 PG (ref 28–33.2)
MCHC RBC AUTO-ENTMCNC: 33 G/DL (ref 32.2–35.5)
MCV RBC AUTO: 102.8 FL (ref 83–98)
MONOCYTES # BLD: 0.64 K/UL (ref 0.28–0.8)
MONOCYTES NFR BLD: 10.1 %
NEUTROPHILS # BLD: 2.86 K/UL (ref 1.7–7)
NEUTS SEG NFR BLD: 45 %
NRBC BLD-RTO: 0 %
PDW BLD AUTO: 11.5 FL (ref 9.4–12.3)
PLATELET # BLD AUTO: 188 K/UL (ref 150–369)
POTASSIUM SERPL-SCNC: 4.3 MEQ/L (ref 3.6–5.1)
PROT SERPL-MCNC: 5.4 G/DL (ref 6–8.2)
RBC # BLD AUTO: 4.34 M/UL (ref 3.93–5.22)
RHEUMATOID FACT SERPL-ACNC: <7 IU/ML
SODIUM SERPL-SCNC: 140 MEQ/L (ref 136–144)
WBC # BLD AUTO: 6.36 K/UL (ref 3.8–10.5)

## 2022-01-18 PROCEDURE — 85025 COMPLETE CBC W/AUTO DIFF WBC: CPT | Performed by: INTERNAL MEDICINE

## 2022-01-18 PROCEDURE — 86617 LYME DISEASE ANTIBODY: CPT | Performed by: INTERNAL MEDICINE

## 2022-01-18 PROCEDURE — 82306 VITAMIN D 25 HYDROXY: CPT | Performed by: INTERNAL MEDICINE

## 2022-01-18 PROCEDURE — 86200 CCP ANTIBODY: CPT | Performed by: INTERNAL MEDICINE

## 2022-01-18 PROCEDURE — 85652 RBC SED RATE AUTOMATED: CPT | Performed by: INTERNAL MEDICINE

## 2022-01-18 PROCEDURE — 30000001 MISCELLANEOUS LAB TEST (NOT TO BE USED FOR COVID19): Performed by: INTERNAL MEDICINE

## 2022-01-18 PROCEDURE — 36415 COLL VENOUS BLD VENIPUNCTURE: CPT | Performed by: INTERNAL MEDICINE

## 2022-01-18 PROCEDURE — 86140 C-REACTIVE PROTEIN: CPT | Performed by: INTERNAL MEDICINE

## 2022-01-18 PROCEDURE — 86431 RHEUMATOID FACTOR QUANT: CPT | Performed by: INTERNAL MEDICINE

## 2022-01-18 PROCEDURE — 86706 HEP B SURFACE ANTIBODY: CPT | Mod: GZ | Performed by: INTERNAL MEDICINE

## 2022-01-18 PROCEDURE — 80053 COMPREHEN METABOLIC PANEL: CPT | Performed by: INTERNAL MEDICINE

## 2022-01-18 PROCEDURE — 86803 HEPATITIS C AB TEST: CPT | Performed by: INTERNAL MEDICINE

## 2022-01-18 PROCEDURE — 87340 HEPATITIS B SURFACE AG IA: CPT | Mod: GZ | Performed by: INTERNAL MEDICINE

## 2022-01-20 LAB
B BURGDOR IGG SER QL IB: NEGATIVE
B BURGDOR IGM SER QL IB: NEGATIVE
B BURGDOR18KD IGG SER QL IB: ABNORMAL
B BURGDOR23KD IGG SER QL IB: ABNORMAL
B BURGDOR23KD IGM SER QL IB: ABNORMAL
B BURGDOR28KD IGG SER QL IB: ABNORMAL
B BURGDOR30KD IGG SER QL IB: ABNORMAL
B BURGDOR39KD IGG SER QL IB: ABNORMAL
B BURGDOR39KD IGM SER QL IB: ABNORMAL
B BURGDOR41KD IGG SER QL IB: REACTIVE
B BURGDOR41KD IGM SER QL IB: ABNORMAL
B BURGDOR45KD IGG SER QL IB: ABNORMAL
B BURGDOR58KD IGG SER QL IB: ABNORMAL
B BURGDOR66KD IGG SER QL IB: ABNORMAL
B BURGDOR93KD IGG SER QL IB: ABNORMAL

## 2022-01-24 LAB — LABORATORY REPORT: NORMAL

## 2022-01-25 LAB — CCP IGA+IGG SERPL IA-ACNC: <8 UNITS

## 2022-01-31 ENCOUNTER — TELEPHONE (OUTPATIENT)
Dept: INTERNAL MEDICINE | Facility: CLINIC | Age: 87
End: 2022-01-31
Payer: MEDICARE

## 2022-01-31 DIAGNOSIS — S22.000A COMPRESSION FRACTURE OF BODY OF THORACIC VERTEBRA (CMS/HCC): ICD-10-CM

## 2022-01-31 DIAGNOSIS — M25.511 RIGHT SHOULDER PAIN, UNSPECIFIED CHRONICITY: ICD-10-CM

## 2022-01-31 DIAGNOSIS — M79.10 MYALGIA: ICD-10-CM

## 2022-01-31 DIAGNOSIS — M54.6 ACUTE BILATERAL THORACIC BACK PAIN: Primary | ICD-10-CM

## 2022-01-31 NOTE — TELEPHONE ENCOUNTER
Pt calling needs updated PT script ; pt now off the steroids and can tell the difference     Pt asking to fax [phone]  to [facility] therapy     P/c pt when faxed [phone]

## 2022-02-17 ENCOUNTER — TELEPHONE (OUTPATIENT)
Dept: INTERNAL MEDICINE | Facility: CLINIC | Age: 87
End: 2022-02-17
Payer: MEDICARE

## 2022-02-17 NOTE — TELEPHONE ENCOUNTER
Called pt and she stated that she took an asprin this morning and now it is completley gone. Drank coffee and water and tried riding a stationary bike and the pain went away.

## 2022-02-17 NOTE — TELEPHONE ENCOUNTER
Patient called stating she was woke up with a shooting rhythmic pain in her left knee it happens every 20-30 seconds it does not impaire her walking but when sitting or laying it is very painful. She is asking what your recommendation is she is worried she wont be able to sleep [facility]     [phone]

## 2022-02-17 NOTE — TELEPHONE ENCOUNTER
Please call PT and see how she is feeling now  Has pain changed?  Has she taken anything for it?  If it is still bothering her she should start tylenol - 1 or 2 extra strength every 8 hours

## 2022-02-18 NOTE — TELEPHONE ENCOUNTER
Patient called yesterday morning with pain in her knee while sleeping and through the day when our MA called her back she stated she took an Asprin and it cleared up but is calling today saying it came back last night while sleeping and she is in excruciating pain and would like to be seen asap    [phone]

## 2022-02-21 ENCOUNTER — OFFICE VISIT (OUTPATIENT)
Dept: INTERNAL MEDICINE | Facility: CLINIC | Age: 87
End: 2022-02-21
Payer: MEDICARE

## 2022-02-21 DIAGNOSIS — M25.562 ACUTE PAIN OF LEFT KNEE: Primary | ICD-10-CM

## 2022-02-21 DIAGNOSIS — N18.31 CHRONIC KIDNEY DISEASE, STAGE 3A (CMS/HCC): ICD-10-CM

## 2022-02-21 PROCEDURE — 99213 OFFICE O/P EST LOW 20 MIN: CPT | Performed by: INTERNAL MEDICINE

## 2022-02-21 NOTE — PROGRESS NOTES
"Subjective      Knee pain      Patient ID: Catia Ward is a 89 y.o. female.    HPI     Pt w episodic spasm of the knee  It was during the night, and it awakened pt from sleep early moring on Thursday. Pt was not able sleep. It stopped 10 am and picked up again about 6 pm.      That night, it occurred again and persisted, but not a sever. Nothing helpfu. For complete reliefl. Pt described it as a nerve. Pt could not find a way to dec the pain. No change in position helpful. No problem walking. It had a sensation of \"jumping\" duiring the episode. It finally stopped yesterday and it was relieved. It stopped and did not start up again.    Pt had been to the gym on Thursday to see if it would improve.      The following have been reviewed and updated as appropriate in this visit:   Allergies  Meds  Problems         Allergies   Allergen Reactions   • Codeine Nausea And Vomiting   • Gentamicin GI intolerance   • Gentamicin Sulfate GI intolerance     Eye redness       Current Outpatient Medications   Medication Sig Dispense Refill   • bacitracin-polymyxin B (POLYSPORIN) ophthalmic ointment      • calcium carbonate-vitamin D3 500 mg(1,250mg) -200 unit powder in packet Take by mouth.     • fluorometholone (FML) 0.1 % ophthalmic suspension      • hydrocortisone 2.5 % ointment      • ibandronate (BONIVA) 150 mg tablet TAKE 1 TABLET EVERY 30 DAYS. TAKE IN THE MORNING WITH GLASS OF WATER PRIOR TO FOOD, DO NOT LIE DOWN FOR 30 MINUTES. 3 tablet 2   • levothyroxine (SYNTHROID) 75 mcg tablet Take 1 tablet (75 mcg total) by mouth once daily. 90 tablet 1   • moxifloxacin (VIGAMOX) 0.5 % ophthalmic solution INSTILL ONE DROP INTO LEFT EYE FOUR TIMES DAILY     • polyvinyl alcohol (LIQUIFILM TEARS) 1.4 % ophthalmic solution 1 drop.     • prednisoLONE acetate (PRED FORTE) 1 % ophthalmic suspension      • raloxifene 60 mg tablet Take 1 tablet (60 mg total) by mouth once daily. 90 tablet 0   • valACYclovir 500 mg tablet Take 500 mg by " mouth daily.     • betamethasone valerate (VALISONE) 0.1 % cream Apply topically 2 (two) times a day. 45 g 1     No current facility-administered medications for this visit.       Past Medical History:   Diagnosis Date   • Femur fracture (CMS/[facility])    • Hypothyroidism    • S/P knee replacement 2012   • Shoulder fracture, left      No family history on file.  Social History     Socioeconomic History   • Marital status:      Spouse name: Not on file   • Number of children: Not on file   • Years of education: Not on file   • Highest education level: Not on file   Occupational History   • Not on file   Tobacco Use   • Smoking status: Never Smoker   • Smokeless tobacco: Never Used   Substance and Sexual Activity   • Alcohol use: Yes     Comment: social   • Drug use: No   • Sexual activity: Defer   Other Topics Concern   • Not on file   Social History Narrative   • Not on file     Social Determinants of Health     Financial Resource Strain: Not on file   Food Insecurity: Not on file   Transportation Needs: Not on file   Physical Activity: Not on file   Stress: Not on file   Social Connections: Not on file   Intimate Partner Violence: Not on file   Housing Stability: Not on file       Review of Systems   Constitutional: Negative for activity change, appetite change, chills, diaphoresis, fatigue, fever and unexpected weight change.   HENT: Negative for congestion, ear pain, postnasal drip, rhinorrhea and sore throat.    Eyes: Negative for visual disturbance.   Respiratory: Negative for cough, chest tightness, shortness of breath and wheezing.    Cardiovascular: Negative for chest pain, palpitations and leg swelling.   Gastrointestinal: Negative for abdominal distention, abdominal pain, blood in stool, constipation, diarrhea, nausea and vomiting.   Genitourinary: Negative for dysuria, frequency, hematuria and urgency.   Musculoskeletal: Negative for arthralgias, back pain, joint swelling and myalgias.   Skin: Negative  for rash.   Neurological: Negative for dizziness and headaches.   Hematological: Does not bruise/bleed easily.   Psychiatric/Behavioral: Negative for decreased concentration, dysphoric mood and sleep disturbance. The patient is not nervous/anxious.        Objective     Visit Vitals  /70 (BP Location: Right upper arm, Patient Position: Sitting)   Pulse 84   Temp 37.1 °C (98.7 °F) (Oral)   Ht 1.524 m (5')   Wt 64 kg (141 lb)   SpO2 98%   BMI 27.54 kg/m²     BP Readings from Last 3 Encounters:   02/21/22 110/70   11/15/21 134/70   11/01/21 120/60     Wt Readings from Last 3 Encounters:   02/21/22 64 kg (141 lb)   11/15/21 61.7 kg (136 lb)   11/01/21 62.4 kg (137 lb 9.6 oz)       Physical Exam  Vitals and nursing note reviewed.   Constitutional:       General: She is not in acute distress.     Appearance: Normal appearance. She is well-developed and normal weight. She is not ill-appearing.   HENT:      Head: Normocephalic and atraumatic.      Mouth/Throat:      Pharynx: No oropharyngeal exudate.   Eyes:      General: No scleral icterus.     Conjunctiva/sclera: Conjunctivae normal.      Pupils: Pupils are equal, round, and reactive to light.   Neck:      Thyroid: No thyromegaly.      Vascular: No JVD.      Trachea: No tracheal deviation.   Cardiovascular:      Rate and Rhythm: Normal rate and regular rhythm.      Pulses: Normal pulses.           Carotid pulses are 2+ on the right side and 2+ on the left side.       Dorsalis pedis pulses are 2+ on the right side and 2+ on the left side.        Posterior tibial pulses are 2+ on the right side and 2+ on the left side.      Heart sounds: Normal heart sounds, S1 normal and S2 normal. No murmur heard.    No S3 or S4 sounds.   Pulmonary:      Effort: Pulmonary effort is normal. No respiratory distress.      Breath sounds: Normal breath sounds. No stridor. No decreased breath sounds, wheezing, rhonchi or rales.   Chest:   Breasts:      Right: No supraclavicular adenopathy.       Left: No supraclavicular adenopathy.       Abdominal:      General: Bowel sounds are normal. There is no distension or abdominal bruit.      Palpations: Abdomen is soft. There is no mass.      Tenderness: There is no abdominal tenderness.      Hernia: No hernia is present.   Musculoskeletal:         General: No swelling or tenderness.      Comments: Left knee with limited rom with flexion, good rom with extension. STS of the knee. No tenderness over the patella. Negative MacMurray sign. Negative drawer sign. Significant cicatrix of the knee.    Lymphadenopathy:      Cervical: No cervical adenopathy.      Right cervical: No superficial or posterior cervical adenopathy.     Left cervical: No superficial or posterior cervical adenopathy.      Upper Body:      Right upper body: No supraclavicular adenopathy.      Left upper body: No supraclavicular adenopathy.   Skin:     General: Skin is warm and dry.      Findings: No rash.   Neurological:      Mental Status: She is alert and oriented to person, place, and time.      Deep Tendon Reflexes:      Reflex Scores:       Patellar reflexes are 2+ on the right side and 2+ on the left side.  Psychiatric:         Speech: Speech normal.         Behavior: Behavior normal.         Lab Results   Component Value Date    WBC 6.36 01/18/2022    HGB 14.7 01/18/2022    HCT 44.6 01/18/2022    .8 (H) 01/18/2022     01/18/2022     Lab Results   Component Value Date    GLUCOSE 105 (H) 01/18/2022    CALCIUM 9.3 01/18/2022     01/18/2022    K 4.3 01/18/2022    CO2 23 01/18/2022     01/18/2022    BUN 19 01/18/2022    CREATININE 1.0 01/18/2022         Assessment/Plan   Problem List Items Addressed This Visit        Genitourinary    Chronic kidney disease, stage 3a (CMS/HCC)      Other Visit Diagnoses     Acute pain of left knee    -  Primary          1. Acute pain of left knee  Pts pain has improved. It is unclear what the etiology is or what made it better. Pt has  not used any medications for the pain. Pt can use tylenol. oK to use alleve or ibuprofen if no GI upset. Hgb at 14.7 gm. No hx of PUD.     2. Chronic kidney disease, stage 3a (CMS/[facility])  GFR most recently at 52.3.     I spent 23 minutes on this date of service performing the following activities: obtaining history, performing examination, preparing for visit and providing counseling and education.      Porsha Short, DO  2/22/2022

## 2022-02-22 VITALS
HEIGHT: 60 IN | OXYGEN SATURATION: 98 % | TEMPERATURE: 98.7 F | BODY MASS INDEX: 27.68 KG/M2 | WEIGHT: 141 LBS | SYSTOLIC BLOOD PRESSURE: 110 MMHG | HEART RATE: 84 BPM | DIASTOLIC BLOOD PRESSURE: 70 MMHG

## 2022-02-22 PROBLEM — D69.2 OTHER NONTHROMBOCYTOPENIC PURPURA: Status: RESOLVED | Noted: 2021-11-01 | Resolved: 2022-02-22

## 2022-02-22 ASSESSMENT — ENCOUNTER SYMPTOMS
COUGH: 0
BRUISES/BLEEDS EASILY: 0
FEVER: 0
SHORTNESS OF BREATH: 0
ACTIVITY CHANGE: 0
BLOOD IN STOOL: 0
FREQUENCY: 0
RHINORRHEA: 0
APPETITE CHANGE: 0
UNEXPECTED WEIGHT CHANGE: 0
BACK PAIN: 0
PALPITATIONS: 0
HEADACHES: 0
DIARRHEA: 0
DECREASED CONCENTRATION: 0
ABDOMINAL DISTENTION: 0
HEMATURIA: 0
ABDOMINAL PAIN: 0
NERVOUS/ANXIOUS: 0
JOINT SWELLING: 0
VOMITING: 0
SLEEP DISTURBANCE: 0
ARTHRALGIAS: 0
CHEST TIGHTNESS: 0
WHEEZING: 0
CHILLS: 0
DIAPHORESIS: 0
SORE THROAT: 0
CONSTIPATION: 0
MYALGIAS: 0
FATIGUE: 0
DYSURIA: 0
DYSPHORIC MOOD: 0
NAUSEA: 0
DIZZINESS: 0

## 2022-02-24 ENCOUNTER — LAB REQUISITION (OUTPATIENT)
Dept: LAB | Facility: HOSPITAL | Age: 87
End: 2022-02-24
Attending: INTERNAL MEDICINE
Payer: MEDICARE

## 2022-02-24 DIAGNOSIS — Z13.220 ENCOUNTER FOR SCREENING FOR LIPOID DISORDERS: ICD-10-CM

## 2022-02-24 DIAGNOSIS — E55.9 VITAMIN D DEFICIENCY, UNSPECIFIED: ICD-10-CM

## 2022-02-24 DIAGNOSIS — Z13.89 ENCOUNTER FOR SCREENING FOR OTHER DISORDER: ICD-10-CM

## 2022-02-24 DIAGNOSIS — E03.9 HYPOTHYROIDISM, UNSPECIFIED: ICD-10-CM

## 2022-02-24 DIAGNOSIS — Z13.0 ENCOUNTER FOR SCREENING FOR DISEASES OF THE BLOOD AND BLOOD-FORMING ORGANS AND CERTAIN DISORDERS INVOLVING THE IMMUNE MECHANISM: ICD-10-CM

## 2022-02-24 LAB
ALBUMIN SERPL-MCNC: 3.5 G/DL (ref 3.4–5)
ALP SERPL-CCNC: 71 IU/L (ref 35–126)
ALT SERPL-CCNC: 14 IU/L (ref 11–54)
ANION GAP SERPL CALC-SCNC: 14 MEQ/L (ref 3–15)
AST SERPL-CCNC: 26 IU/L (ref 15–41)
BACTERIA URNS QL MICRO: ABNORMAL /HPF
BASOPHILS # BLD: 0.07 K/UL (ref 0.01–0.1)
BASOPHILS NFR BLD: 1 %
BILIRUB SERPL-MCNC: 0.7 MG/DL (ref 0.3–1.2)
BILIRUB UR QL STRIP.AUTO: NEGATIVE MG/DL
BUN SERPL-MCNC: 23 MG/DL (ref 8–20)
CALCIUM SERPL-MCNC: 9.3 MG/DL (ref 8.9–10.3)
CHLORIDE SERPL-SCNC: 106 MEQ/L (ref 98–109)
CHOLEST SERPL-MCNC: 201 MG/DL
CLARITY UR REFRACT.AUTO: CLEAR
CO2 SERPL-SCNC: 22 MEQ/L (ref 22–32)
COLOR UR AUTO: YELLOW
CREAT SERPL-MCNC: 0.9 MG/DL (ref 0.6–1.1)
DIFFERENTIAL METHOD BLD: ABNORMAL
EOSINOPHIL # BLD: 0.25 K/UL (ref 0.04–0.36)
EOSINOPHIL NFR BLD: 3.6 %
ERYTHROCYTE [DISTWIDTH] IN BLOOD BY AUTOMATED COUNT: 13.5 % (ref 11.7–14.4)
GFR SERPL CREATININE-BSD FRML MDRD: 59.1 ML/MIN/1.73M*2
GLUCOSE SERPL-MCNC: 101 MG/DL (ref 70–99)
GLUCOSE UR STRIP.AUTO-MCNC: NEGATIVE MG/DL
HCT VFR BLDCO AUTO: 45.3 % (ref 35–45)
HDLC SERPL-MCNC: 64 MG/DL
HDLC SERPL: 3.1 {RATIO}
HGB BLD-MCNC: 15 G/DL (ref 11.8–15.7)
HGB UR QL STRIP.AUTO: NEGATIVE
HYALINE CASTS #/AREA URNS LPF: ABNORMAL /LPF
IMM GRANULOCYTES # BLD AUTO: 0.02 K/UL (ref 0–0.08)
IMM GRANULOCYTES NFR BLD AUTO: 0.3 %
KETONES UR STRIP.AUTO-MCNC: NEGATIVE MG/DL
LDLC SERPL CALC-MCNC: 123 MG/DL
LEUKOCYTE ESTERASE UR QL STRIP.AUTO: 2
LYMPHOCYTES # BLD: 2.91 K/UL (ref 1.2–3.5)
LYMPHOCYTES NFR BLD: 42 %
MCH RBC QN AUTO: 34.1 PG (ref 28–33.2)
MCHC RBC AUTO-ENTMCNC: 33.1 G/DL (ref 32.2–35.5)
MCV RBC AUTO: 103 FL (ref 83–98)
MONOCYTES # BLD: 0.65 K/UL (ref 0.28–0.8)
MONOCYTES NFR BLD: 9.4 %
MUCOUS THREADS URNS QL MICRO: ABNORMAL /LPF
NEUTROPHILS # BLD: 3.03 K/UL (ref 1.7–7)
NEUTS SEG NFR BLD: 43.7 %
NITRITE UR QL STRIP.AUTO: NEGATIVE
NONHDLC SERPL-MCNC: 137 MG/DL
NRBC BLD-RTO: 0 %
PDW BLD AUTO: 11.6 FL (ref 9.4–12.3)
PH UR STRIP.AUTO: 5.5 [PH]
PLATELET # BLD AUTO: 199 K/UL (ref 150–369)
POTASSIUM SERPL-SCNC: 4.3 MEQ/L (ref 3.6–5.1)
PROT SERPL-MCNC: 5.4 G/DL (ref 6–8.2)
PROT UR QL STRIP.AUTO: NEGATIVE
RBC # BLD AUTO: 4.4 M/UL (ref 3.93–5.22)
RBC #/AREA URNS HPF: ABNORMAL /HPF
SODIUM SERPL-SCNC: 142 MEQ/L (ref 136–144)
SP GR UR REFRACT.AUTO: 1.02
SQUAMOUS URNS QL MICRO: ABNORMAL /HPF
TRIGL SERPL-MCNC: 71 MG/DL (ref 30–149)
TSH SERPL DL<=0.05 MIU/L-ACNC: 1.91 MIU/L (ref 0.34–5.6)
UROBILINOGEN UR STRIP-ACNC: 0.2 EU/DL
WBC # BLD AUTO: 6.93 K/UL (ref 3.8–10.5)
WBC #/AREA URNS HPF: ABNORMAL /HPF

## 2022-02-24 PROCEDURE — 80061 LIPID PANEL: CPT | Performed by: INTERNAL MEDICINE

## 2022-02-24 PROCEDURE — 82652 VIT D 1 25-DIHYDROXY: CPT | Performed by: INTERNAL MEDICINE

## 2022-02-24 PROCEDURE — 85025 COMPLETE CBC W/AUTO DIFF WBC: CPT | Performed by: INTERNAL MEDICINE

## 2022-02-24 PROCEDURE — 81001 URINALYSIS AUTO W/SCOPE: CPT | Performed by: INTERNAL MEDICINE

## 2022-02-24 PROCEDURE — 80053 COMPREHEN METABOLIC PANEL: CPT | Performed by: INTERNAL MEDICINE

## 2022-02-24 PROCEDURE — 36415 COLL VENOUS BLD VENIPUNCTURE: CPT | Performed by: INTERNAL MEDICINE

## 2022-02-24 PROCEDURE — 84443 ASSAY THYROID STIM HORMONE: CPT | Performed by: INTERNAL MEDICINE

## 2022-02-28 LAB
1,25(OH)2D SERPL-MCNC: 30 PG/ML (ref 18–72)
1,25(OH)2D2 SERPL-MCNC: <8 PG/ML
1,25(OH)2D3 SERPL-MCNC: 30 PG/ML

## 2022-03-03 ENCOUNTER — OFFICE VISIT (OUTPATIENT)
Dept: INTERNAL MEDICINE | Facility: CLINIC | Age: 87
End: 2022-03-03
Payer: MEDICARE

## 2022-03-03 ENCOUNTER — TELEPHONE (OUTPATIENT)
Dept: INTERNAL MEDICINE | Facility: CLINIC | Age: 87
End: 2022-03-03

## 2022-03-03 VITALS
TEMPERATURE: 97.5 F | BODY MASS INDEX: 27.52 KG/M2 | SYSTOLIC BLOOD PRESSURE: 110 MMHG | HEART RATE: 79 BPM | WEIGHT: 140.2 LBS | HEIGHT: 60 IN | OXYGEN SATURATION: 95 % | DIASTOLIC BLOOD PRESSURE: 80 MMHG

## 2022-03-03 DIAGNOSIS — Z12.31 SCREENING MAMMOGRAM FOR BREAST CANCER: ICD-10-CM

## 2022-03-03 DIAGNOSIS — S22.000A COMPRESSION FRACTURE OF BODY OF THORACIC VERTEBRA (CMS/HCC): ICD-10-CM

## 2022-03-03 DIAGNOSIS — N18.31 CHRONIC KIDNEY DISEASE, STAGE 3A (CMS/HCC): ICD-10-CM

## 2022-03-03 DIAGNOSIS — E03.9 HYPOTHYROIDISM, UNSPECIFIED TYPE: Primary | ICD-10-CM

## 2022-03-03 DIAGNOSIS — M81.0 AGE-RELATED OSTEOPOROSIS WITHOUT CURRENT PATHOLOGICAL FRACTURE: ICD-10-CM

## 2022-03-03 DIAGNOSIS — M25.569 KNEE PAIN, UNSPECIFIED CHRONICITY, UNSPECIFIED LATERALITY: ICD-10-CM

## 2022-03-03 PROCEDURE — 99214 OFFICE O/P EST MOD 30 MIN: CPT | Performed by: INTERNAL MEDICINE

## 2022-03-03 ASSESSMENT — ENCOUNTER SYMPTOMS
SLEEP DISTURBANCE: 0
NERVOUS/ANXIOUS: 0
DECREASED CONCENTRATION: 0
HEADACHES: 0
DIAPHORESIS: 0
NAUSEA: 0
CONSTIPATION: 0
ARTHRALGIAS: 1
APPETITE CHANGE: 0
HEMATURIA: 0
ABDOMINAL PAIN: 0
FEVER: 0
VOMITING: 0
BLOOD IN STOOL: 0
BRUISES/BLEEDS EASILY: 0
MYALGIAS: 0
SHORTNESS OF BREATH: 0
ABDOMINAL DISTENTION: 0
CHEST TIGHTNESS: 0
WHEEZING: 0
RHINORRHEA: 0
FREQUENCY: 0
DYSPHORIC MOOD: 0
ACTIVITY CHANGE: 0
COUGH: 0
CHILLS: 0
UNEXPECTED WEIGHT CHANGE: 0
DYSURIA: 0
PALPITATIONS: 0
BACK PAIN: 0
DIARRHEA: 0
FATIGUE: 0
JOINT SWELLING: 0
SORE THROAT: 0
DIZZINESS: 0

## 2022-03-03 NOTE — TELEPHONE ENCOUNTER
Elsi, please call PT at [facility]. Would they be able to get a back support brace for the low thoracic spine for this pt?  Ask for Cyril. Hx of compression fx at T11.

## 2022-03-03 NOTE — TELEPHONE ENCOUNTER
Spoke to Cyril at  he will look into getting the brace for the Pt as it is not something they have done in the past. He will return call to the office

## 2022-03-03 NOTE — PROGRESS NOTES
Patient ID: Catia Ward is a 89 y.o. female.      Follow up     HPI     Knee pain   Resolved    Back pain   Not real pain   Pt gets tired  H/o compression fx related to osteoporsis  On dual therapy    Hypothyroidism.  Monitor response to therapy.                The following have been reviewed and updated as appropriate in this visit:   Allergies  Meds  Problems         Allergies   Allergen Reactions   • Codeine Nausea And Vomiting   • Gentamicin GI intolerance   • Gentamicin Sulfate GI intolerance     Eye redness       Current Outpatient Medications   Medication Sig Dispense Refill   • bacitracin-polymyxin B (POLYSPORIN) ophthalmic ointment      • betamethasone valerate (VALISONE) 0.1 % cream Apply topically 2 (two) times a day. 45 g 1   • calcium carbonate-vitamin D3 500 mg(1,250mg) -200 unit powder in packet Take by mouth.     • fluorometholone (FML) 0.1 % ophthalmic suspension      • hydrocortisone 2.5 % ointment      • ibandronate (BONIVA) 150 mg tablet TAKE 1 TABLET EVERY 30 DAYS. TAKE IN THE MORNING WITH GLASS OF WATER PRIOR TO FOOD, DO NOT LIE DOWN FOR 30 MINUTES. 3 tablet 2   • levothyroxine (SYNTHROID) 75 mcg tablet Take 1 tablet (75 mcg total) by mouth once daily. 90 tablet 1   • moxifloxacin (VIGAMOX) 0.5 % ophthalmic solution INSTILL ONE DROP INTO LEFT EYE FOUR TIMES DAILY     • polyvinyl alcohol (LIQUIFILM TEARS) 1.4 % ophthalmic solution 1 drop.     • prednisoLONE acetate (PRED FORTE) 1 % ophthalmic suspension      • raloxifene 60 mg tablet Take 1 tablet (60 mg total) by mouth once daily. 90 tablet 0   • valACYclovir 500 mg tablet Take 500 mg by mouth daily.       No current facility-administered medications for this visit.       Past Medical History:   Diagnosis Date   • Femur fracture (CMS/[facility])    • Hypothyroidism    • S/P knee replacement 2012   • Shoulder fracture, left      No family history on file.  Social History     Socioeconomic History   • Marital status:      Spouse name: Not  "on file   • Number of children: Not on file   • Years of education: Not on file   • Highest education level: Not on file   Occupational History   • Not on file   Tobacco Use   • Smoking status: Never Smoker   • Smokeless tobacco: Never Used   Substance and Sexual Activity   • Alcohol use: Yes     Comment: social   • Drug use: No   • Sexual activity: Defer   Other Topics Concern   • Not on file   Social History Narrative   • Not on file     Social Determinants of Health     Financial Resource Strain: Not on file   Food Insecurity: Not on file   Transportation Needs: Not on file   Physical Activity: Not on file   Stress: Not on file   Social Connections: Not on file   Intimate Partner Violence: Not on file   Housing Stability: Not on file       Review of Systems   Constitutional: Negative for activity change, appetite change, chills, diaphoresis, fatigue, fever and unexpected weight change.   HENT: Negative for congestion, ear pain, postnasal drip, rhinorrhea and sore throat.    Eyes: Negative for visual disturbance.   Respiratory: Negative for cough, chest tightness, shortness of breath and wheezing.    Cardiovascular: Negative for chest pain, palpitations and leg swelling.   Gastrointestinal: Negative for abdominal distention, abdominal pain, blood in stool, constipation, diarrhea, nausea and vomiting.   Genitourinary: Negative for dysuria, frequency, hematuria and urgency.        Nocturia x 1.     occ \"leakage\" with coughing or sneezing.    Musculoskeletal: Positive for arthralgias (shoulder pain. had prednisone and now PT. ). Negative for back pain, joint swelling and myalgias.   Skin: Negative for rash.   Neurological: Negative for dizziness and headaches.   Hematological: Does not bruise/bleed easily.   Psychiatric/Behavioral: Negative for decreased concentration, dysphoric mood and sleep disturbance. The patient is not nervous/anxious.         Occ sleep disturbance related to the shoulder. occ wakes up and can " not fall back to sleep       Objective     Visit Vitals  /80 (BP Location: Right upper arm, Patient Position: Sitting)   Pulse 79   Temp 36.4 °C (97.5 °F) (Oral)   Ht 1.524 m (5')   Wt 63.6 kg (140 lb 3.2 oz)   SpO2 95%   BMI 27.38 kg/m²     BP Readings from Last 3 Encounters:   03/03/22 110/80   02/21/22 110/70   11/15/21 134/70     Wt Readings from Last 3 Encounters:   03/03/22 63.6 kg (140 lb 3.2 oz)   02/21/22 64 kg (141 lb)   11/15/21 61.7 kg (136 lb)       Physical Exam  Vitals reviewed.   Constitutional:       General: She is not in acute distress.     Appearance: Normal appearance. She is well-developed and normal weight.   HENT:      Head: Normocephalic and atraumatic.      Right Ear: Tympanic membrane, ear canal and external ear normal.      Left Ear: Tympanic membrane, ear canal and external ear normal.      Mouth/Throat:      Pharynx: No oropharyngeal exudate.   Eyes:      Conjunctiva/sclera: Conjunctivae normal.      Pupils: Pupils are equal, round, and reactive to light.   Neck:      Thyroid: No thyromegaly.      Vascular: No JVD.      Trachea: No tracheal deviation.   Cardiovascular:      Rate and Rhythm: Normal rate and regular rhythm.      Pulses: Normal pulses.           Carotid pulses are 2+ on the right side and 2+ on the left side.       Dorsalis pedis pulses are 2+ on the right side and 2+ on the left side.        Posterior tibial pulses are 2+ on the right side and 2+ on the left side.      Heart sounds: Normal heart sounds, S1 normal and S2 normal. No murmur heard.    No S3 or S4 sounds.   Pulmonary:      Effort: Pulmonary effort is normal. No respiratory distress.      Breath sounds: Normal breath sounds. No stridor. No decreased breath sounds, wheezing, rhonchi or rales.   Chest:   Breasts:      Right: No supraclavicular adenopathy.      Left: No supraclavicular adenopathy.        Comments: Breasts with no masses, no nipple discharge, no skin retraction, no axillary nodes bilaterally.      Abdominal:      General: Bowel sounds are normal. There is no distension or abdominal bruit.      Palpations: Abdomen is soft. There is no mass.      Tenderness: There is no abdominal tenderness.      Hernia: No hernia is present.   Musculoskeletal:         General: No swelling.   Lymphadenopathy:      Cervical: No cervical adenopathy.      Right cervical: No superficial or posterior cervical adenopathy.     Left cervical: No superficial or posterior cervical adenopathy.      Upper Body:      Right upper body: No supraclavicular adenopathy.      Left upper body: No supraclavicular adenopathy.   Skin:     General: Skin is warm and dry.      Findings: No rash.   Neurological:      Mental Status: She is alert and oriented to person, place, and time.      Deep Tendon Reflexes:      Reflex Scores:       Patellar reflexes are 2+ on the right side and 2+ on the left side.  Psychiatric:         Speech: Speech normal.         Behavior: Behavior normal.         Lab Results   Component Value Date    WBC 6.93 02/24/2022    HGB 15.0 02/24/2022    HCT 45.3 (H) 02/24/2022    .0 (H) 02/24/2022     02/24/2022     Lab Results   Component Value Date    GLUCOSE 101 (H) 02/24/2022    CALCIUM 9.3 02/24/2022     02/24/2022    K 4.3 02/24/2022    CO2 22 02/24/2022     02/24/2022    BUN 23 (H) 02/24/2022    CREATININE 0.9 02/24/2022         Assessment/Plan   Problem List Items Addressed This Visit     None      Visit Diagnoses     Screening mammogram for breast cancer    -  Primary    Relevant Orders    BI SCREENING MAMMOGRAM BILATERAL(TOMOSYNTHESIS)        1. Hypothyroidism, unspecified type  Clinically euthyroid.  Recent labs reviewed.  Most recent TSH at 1.91.    2. Chronic kidney disease, stage 3a (CMS/HCC)  Patient reports nephrotoxins.  GFR is at 59.1 and stable.    3. Compression fracture of body of thoracic vertebra (CMS/HCC)  Patient had acute compression fracture of the thoracic vertebrae.  It has  healed.  However, she continues to have occasional pain.  Worsening pain, she gets a fatigue in her back.  She asked if that could be relieved.  There can be perhaps a brace, which was her query.  Staff will reach out to the physical therapist at [facility] and inquire.  They will be able to hopefully measure her for it if that is required, and show her how to put it on.  She does use a walker when she goes long distances, for example the hallways at [facility].  Otherwise, her pain is as mentioned above manageable.  And her quality of life is much better.    4. Age-related osteoporosis without current pathological fracture  Patient's most recent bone density reviewed with patient.  Patient will continue dual therapy with an alendronate, and raloxifene.    5. Screening mammogram for breast cancer  Normal breast exam today.  - BI SCREENING MAMMOGRAM BILATERAL(TOMOSYNTHESIS); Future    6. Knee pain, unspecified chronicity, unspecified laterality  Pain resolved.    I spent 33 minutes on this date of service performing the following activities: obtaining history, performing examination, entering orders, preparing for visit and providing counseling and education.        Porsha Short, DO  3/5/2022

## 2022-03-03 NOTE — PATIENT INSTRUCTIONS
Vitamin B12 supplement 1000 micrograms SUBLINGUAL, which means under the tongue.     One softgel of the calcium/vit D supplement with breakfast and dinner.   It adds to the right amount.

## 2022-03-23 ENCOUNTER — TELEPHONE (OUTPATIENT)
Dept: INTERNAL MEDICINE | Facility: CLINIC | Age: 87
End: 2022-03-23
Payer: MEDICARE

## 2022-03-23 ENCOUNTER — APPOINTMENT (EMERGENCY)
Dept: RADIOLOGY | Facility: HOSPITAL | Age: 87
DRG: 042 | End: 2022-03-23
Attending: EMERGENCY MEDICINE
Payer: MEDICARE

## 2022-03-23 ENCOUNTER — HOSPITAL ENCOUNTER (INPATIENT)
Facility: HOSPITAL | Age: 87
LOS: 1 days | Discharge: HOME | DRG: 042 | End: 2022-03-25
Attending: EMERGENCY MEDICINE | Admitting: INTERNAL MEDICINE
Payer: MEDICARE

## 2022-03-23 DIAGNOSIS — I63.9 ACUTE CVA (CEREBROVASCULAR ACCIDENT) (CMS/HCC): ICD-10-CM

## 2022-03-23 DIAGNOSIS — R29.898 WEAKNESS OF LEFT LOWER EXTREMITY: Primary | ICD-10-CM

## 2022-03-23 DIAGNOSIS — R29.898 TRANSIENT LEFT LEG WEAKNESS: ICD-10-CM

## 2022-03-23 PROBLEM — M81.0 OSTEOPOROSIS: Status: ACTIVE | Noted: 2022-03-23

## 2022-03-23 PROBLEM — E87.6 HYPOKALEMIA: Status: ACTIVE | Noted: 2022-03-23

## 2022-03-23 LAB
ALBUMIN SERPL-MCNC: 4.4 G/DL (ref 3.4–5)
ALP SERPL-CCNC: 64 IU/L (ref 35–126)
ALT SERPL-CCNC: 18 IU/L (ref 11–54)
ANION GAP SERPL CALC-SCNC: 12 MEQ/L (ref 3–15)
APTT PPP: 27 SEC (ref 23–35)
AST SERPL-CCNC: 34 IU/L (ref 15–41)
ATRIAL RATE: 86
BASOPHILS # BLD: 0.07 K/UL (ref 0.01–0.1)
BASOPHILS NFR BLD: 1.2 %
BILIRUB SERPL-MCNC: 1 MG/DL (ref 0.3–1.2)
BILIRUB UR QL STRIP.AUTO: NEGATIVE MG/DL
BUN SERPL-MCNC: 17 MG/DL (ref 8–20)
CALCIUM SERPL-MCNC: 9.7 MG/DL (ref 8.9–10.3)
CHLORIDE SERPL-SCNC: 104 MEQ/L (ref 98–109)
CLARITY UR REFRACT.AUTO: CLEAR
CO2 SERPL-SCNC: 25 MEQ/L (ref 22–32)
COLOR UR AUTO: COLORLESS
CREAT SERPL-MCNC: 0.8 MG/DL (ref 0.6–1.1)
DIFFERENTIAL METHOD BLD: ABNORMAL
EOSINOPHIL # BLD: 0.13 K/UL (ref 0.04–0.36)
EOSINOPHIL NFR BLD: 2.2 %
ERYTHROCYTE [DISTWIDTH] IN BLOOD BY AUTOMATED COUNT: 12.2 % (ref 11.7–14.4)
GFR SERPL CREATININE-BSD FRML MDRD: >60 ML/MIN/1.73M*2
GLUCOSE SERPL-MCNC: 150 MG/DL (ref 70–99)
GLUCOSE UR STRIP.AUTO-MCNC: NEGATIVE MG/DL
HCT VFR BLDCO AUTO: 48.7 % (ref 35–45)
HGB BLD-MCNC: 16.6 G/DL (ref 11.8–15.7)
HGB UR QL STRIP.AUTO: NEGATIVE
IMM GRANULOCYTES # BLD AUTO: 0.02 K/UL (ref 0–0.08)
IMM GRANULOCYTES NFR BLD AUTO: 0.3 %
INR PPP: 1
KETONES UR STRIP.AUTO-MCNC: NEGATIVE MG/DL
LEUKOCYTE ESTERASE UR QL STRIP.AUTO: NEGATIVE
LYMPHOCYTES # BLD: 1.79 K/UL (ref 1.2–3.5)
LYMPHOCYTES NFR BLD: 29.9 %
MCH RBC QN AUTO: 33.9 PG (ref 28–33.2)
MCHC RBC AUTO-ENTMCNC: 34.1 G/DL (ref 32.2–35.5)
MCV RBC AUTO: 99.4 FL (ref 83–98)
MONOCYTES # BLD: 0.4 K/UL (ref 0.28–0.8)
MONOCYTES NFR BLD: 6.7 %
NEUTROPHILS # BLD: 3.58 K/UL (ref 1.7–7)
NEUTS SEG NFR BLD: 59.7 %
NITRITE UR QL STRIP.AUTO: NEGATIVE
NRBC BLD-RTO: 0 %
P AXIS: 49
PDW BLD AUTO: 10.9 FL (ref 9.4–12.3)
PH UR STRIP.AUTO: 6.5 [PH]
PLATELET # BLD AUTO: 179 K/UL (ref 150–369)
POTASSIUM SERPL-SCNC: 3.7 MEQ/L (ref 3.6–5.1)
PR INTERVAL: 158
PROT SERPL-MCNC: 7 G/DL (ref 6–8.2)
PROT UR QL STRIP.AUTO: NEGATIVE
PROTHROMBIN TIME: 12.8 SEC (ref 12.2–14.5)
QRS DURATION: 84
QT INTERVAL: 400
QTC CALCULATION(BAZETT): 478
R AXIS: -23
RBC # BLD AUTO: 4.9 M/UL (ref 3.93–5.22)
SARS-COV-2 RNA RESP QL NAA+PROBE: NEGATIVE
SODIUM SERPL-SCNC: 141 MEQ/L (ref 136–144)
SP GR UR REFRACT.AUTO: 1.01
T WAVE AXIS: 33
TROPONIN I SERPL HS-MCNC: 4.4 PG/ML
TROPONIN I SERPL HS-MCNC: 4.4 PG/ML
UROBILINOGEN UR STRIP-ACNC: 0.2 EU/DL
VENTRICULAR RATE: 86
WBC # BLD AUTO: 5.99 K/UL (ref 3.8–10.5)

## 2022-03-23 PROCEDURE — 63700000 HC SELF-ADMINISTRABLE DRUG: Performed by: PHYSICIAN ASSISTANT

## 2022-03-23 PROCEDURE — 99220 PR INITIAL OBSERVATION CARE/DAY 70 MINUTES: CPT | Performed by: INTERNAL MEDICINE

## 2022-03-23 PROCEDURE — G0378 HOSPITAL OBSERVATION PER HR: HCPCS

## 2022-03-23 PROCEDURE — U0002 COVID-19 LAB TEST NON-CDC: HCPCS | Performed by: PHYSICIAN ASSISTANT

## 2022-03-23 PROCEDURE — 85025 COMPLETE CBC W/AUTO DIFF WBC: CPT

## 2022-03-23 PROCEDURE — 63700000 HC SELF-ADMINISTRABLE DRUG: Performed by: INTERNAL MEDICINE

## 2022-03-23 PROCEDURE — 36415 COLL VENOUS BLD VENIPUNCTURE: CPT

## 2022-03-23 PROCEDURE — 85025 COMPLETE CBC W/AUTO DIFF WBC: CPT | Performed by: EMERGENCY MEDICINE

## 2022-03-23 PROCEDURE — 84484 ASSAY OF TROPONIN QUANT: CPT | Mod: 91 | Performed by: EMERGENCY MEDICINE

## 2022-03-23 PROCEDURE — G1004 CDSM NDSC: HCPCS

## 2022-03-23 PROCEDURE — 81003 URINALYSIS AUTO W/O SCOPE: CPT | Performed by: PHYSICIAN ASSISTANT

## 2022-03-23 PROCEDURE — 71045 X-RAY EXAM CHEST 1 VIEW: CPT

## 2022-03-23 PROCEDURE — 80053 COMPREHEN METABOLIC PANEL: CPT

## 2022-03-23 PROCEDURE — 93005 ELECTROCARDIOGRAM TRACING: CPT | Performed by: PHYSICIAN ASSISTANT

## 2022-03-23 PROCEDURE — 84484 ASSAY OF TROPONIN QUANT: CPT | Performed by: EMERGENCY MEDICINE

## 2022-03-23 PROCEDURE — 80053 COMPREHEN METABOLIC PANEL: CPT | Performed by: EMERGENCY MEDICINE

## 2022-03-23 PROCEDURE — 99285 EMERGENCY DEPT VISIT HI MDM: CPT | Mod: 25

## 2022-03-23 PROCEDURE — 85730 THROMBOPLASTIN TIME PARTIAL: CPT | Performed by: PHYSICIAN ASSISTANT

## 2022-03-23 PROCEDURE — 25800000 HC PHARMACY IV SOLUTIONS: Performed by: PHYSICIAN ASSISTANT

## 2022-03-23 PROCEDURE — 99223 1ST HOSP IP/OBS HIGH 75: CPT | Performed by: PSYCHIATRY & NEUROLOGY

## 2022-03-23 PROCEDURE — 85610 PROTHROMBIN TIME: CPT | Performed by: PHYSICIAN ASSISTANT

## 2022-03-23 PROCEDURE — 96361 HYDRATE IV INFUSION ADD-ON: CPT | Performed by: INTERNAL MEDICINE

## 2022-03-23 PROCEDURE — 96360 HYDRATION IV INFUSION INIT: CPT

## 2022-03-23 RX ORDER — ACETAMINOPHEN 325 MG/1
650 TABLET ORAL EVERY 4 HOURS PRN
Status: DISCONTINUED | OUTPATIENT
Start: 2022-03-23 | End: 2022-03-25 | Stop reason: HOSPADM

## 2022-03-23 RX ORDER — VALACYCLOVIR HYDROCHLORIDE 1 G/1
500 TABLET, FILM COATED ORAL DAILY
Status: DISCONTINUED | OUTPATIENT
Start: 2022-03-23 | End: 2022-03-23

## 2022-03-23 RX ORDER — BETAMETHASONE VALERATE 1 MG/G
1 CREAM TOPICAL AS NEEDED
COMMUNITY
End: 2023-04-03 | Stop reason: SDUPTHER

## 2022-03-23 RX ORDER — RALOXIFENE HYDROCHLORIDE 60 MG/1
60 TABLET, FILM COATED ORAL
Status: DISCONTINUED | OUTPATIENT
Start: 2022-03-23 | End: 2022-03-25 | Stop reason: HOSPADM

## 2022-03-23 RX ORDER — DEXTROSE 50 % IN WATER (D50W) INTRAVENOUS SYRINGE
25 AS NEEDED
Status: DISCONTINUED | OUTPATIENT
Start: 2022-03-23 | End: 2022-03-25 | Stop reason: HOSPADM

## 2022-03-23 RX ORDER — FLUOROMETHOLONE 1 MG/ML
1 SUSPENSION/ DROPS OPHTHALMIC 2 TIMES DAILY
Status: DISCONTINUED | OUTPATIENT
Start: 2022-03-23 | End: 2022-03-25 | Stop reason: HOSPADM

## 2022-03-23 RX ORDER — LEVOTHYROXINE SODIUM 75 UG/1
75 TABLET ORAL
Status: DISCONTINUED | OUTPATIENT
Start: 2022-03-23 | End: 2022-03-25 | Stop reason: HOSPADM

## 2022-03-23 RX ORDER — IBUPROFEN 200 MG
16-32 TABLET ORAL AS NEEDED
Status: DISCONTINUED | OUTPATIENT
Start: 2022-03-23 | End: 2022-03-25 | Stop reason: HOSPADM

## 2022-03-23 RX ORDER — LEVOTHYROXINE SODIUM 75 UG/1
75 TABLET ORAL
COMMUNITY
End: 2022-04-28

## 2022-03-23 RX ORDER — VALACYCLOVIR HYDROCHLORIDE 1 G/1
1000 TABLET, FILM COATED ORAL DAILY
Status: DISCONTINUED | OUTPATIENT
Start: 2022-03-24 | End: 2022-03-23

## 2022-03-23 RX ORDER — RALOXIFENE HYDROCHLORIDE 60 MG/1
60 TABLET, FILM COATED ORAL DAILY
COMMUNITY
End: 2022-05-23

## 2022-03-23 RX ORDER — VALACYCLOVIR HYDROCHLORIDE 1 G/1
1000 TABLET, FILM COATED ORAL DAILY
Status: DISCONTINUED | OUTPATIENT
Start: 2022-03-24 | End: 2022-03-25 | Stop reason: HOSPADM

## 2022-03-23 RX ORDER — NAPROXEN SODIUM 220 MG/1
324 TABLET, FILM COATED ORAL ONCE
Status: COMPLETED | OUTPATIENT
Start: 2022-03-23 | End: 2022-03-23

## 2022-03-23 RX ORDER — CHROMIUM PICOLINATE 200 MCG
1 TABLET ORAL 2 TIMES DAILY
COMMUNITY

## 2022-03-23 RX ORDER — DEXTROSE 40 %
15-30 GEL (GRAM) ORAL AS NEEDED
Status: DISCONTINUED | OUTPATIENT
Start: 2022-03-23 | End: 2022-03-25 | Stop reason: HOSPADM

## 2022-03-23 RX ORDER — FLUOROMETHOLONE 1 MG/ML
1 SUSPENSION/ DROPS OPHTHALMIC 2 TIMES DAILY
Status: DISCONTINUED | OUTPATIENT
Start: 2022-03-23 | End: 2022-03-23

## 2022-03-23 RX ORDER — ARTIFICIAL TEARS 1; 2; 3 MG/ML; MG/ML; MG/ML
1 SOLUTION/ DROPS OPHTHALMIC 3 TIMES DAILY PRN
Status: DISCONTINUED | OUTPATIENT
Start: 2022-03-23 | End: 2022-03-25 | Stop reason: HOSPADM

## 2022-03-23 RX ADMIN — LEVOTHYROXINE SODIUM 75 MCG: 75 TABLET ORAL at 16:15

## 2022-03-23 RX ADMIN — SODIUM CHLORIDE 1000 ML: 9 INJECTION, SOLUTION INTRAVENOUS at 12:01

## 2022-03-23 RX ADMIN — FLUOROMETHOLONE 1 DROP: 1 SOLUTION/ DROPS OPHTHALMIC at 20:49

## 2022-03-23 RX ADMIN — RALOXIFENE 60 MG: 60 TABLET ORAL at 17:15

## 2022-03-23 RX ADMIN — ASPIRIN 81 MG 324 MG: 81 TABLET ORAL at 15:11

## 2022-03-23 ASSESSMENT — ENCOUNTER SYMPTOMS
DIARRHEA: 0
FACIAL ASYMMETRY: 0
SEIZURES: 0
SPEECH DIFFICULTY: 0
CHEST TIGHTNESS: 0
COLOR CHANGE: 0
HEADACHES: 0
BACK PAIN: 0
DIZZINESS: 0
SHORTNESS OF BREATH: 0
JOINT SWELLING: 0
NECK PAIN: 0
TROUBLE SWALLOWING: 0
CHILLS: 0
FLANK PAIN: 0
PALPITATIONS: 0
ABDOMINAL PAIN: 0
LIGHT-HEADEDNESS: 0
DIFFICULTY URINATING: 0
WEAKNESS: 1
HEMATURIA: 0
EYE REDNESS: 0
TREMORS: 0
NAUSEA: 0
DYSURIA: 0
FEVER: 0
FACIAL SWELLING: 0
VOMITING: 0

## 2022-03-23 ASSESSMENT — PATIENT HEALTH QUESTIONNAIRE - PHQ9: SUM OF ALL RESPONSES TO PHQ9 QUESTIONS 1 & 2: 0

## 2022-03-23 NOTE — PLAN OF CARE
Plan of Care Review  Plan of Care Reviewed With: patient  Progress: no change  Outcome Summary: Pt recd from ER. ambulated to bed ax1 with RW. NIH 3. no c/o pain. Oriented to room. bed alarm engaged.

## 2022-03-23 NOTE — TELEPHONE ENCOUNTER
Please call PT and get some more information  Is there numbness? Weakness? Gait change?  Any other symptoms?

## 2022-03-23 NOTE — Clinical Note
The subxiphoid process was marked  and prepped with ChloraPrep. The patient was draped in a sterile fashion after allowing for the recommended dry time.

## 2022-03-23 NOTE — ED PROVIDER NOTES
"Emergency Medicine Note  HPI   HISTORY OF PRESENT ILLNESS     88 y/o f c/o episode of L leg weakness when waking up overnight at 3am to use the bathroom. Reports she doesn't typically ambulate with a walker but uses it overnight to go to the bathroom. She reports left leg \"felt like wood\" and was weak which made it difficult to walk. She reports she went back to bed and sx were still there when she woke up in the morning but have since improved significantly. She reports leg still \"feels funny\". Denies numbness, cp, sob, ha, dizziness, f/c, cough, abd pain, n/v, urinary sx, dizziness, confusion, leg swelling. No recent falls.             Patient History   PAST HISTORY     Reviewed from Nursing Triage:         Past Medical History:   Diagnosis Date   • Femur fracture (CMS/[facility])    • Hypothyroidism    • S/P knee replacement 2012   • Shoulder fracture, left        Past Surgical History:   Procedure Laterality Date   • ADENOIDECTOMY     • JOINT REPLACEMENT     • TONSILLECTOMY         No family history on file.    Social History     Tobacco Use   • Smoking status: Never Smoker   • Smokeless tobacco: Never Used   Substance Use Topics   • Alcohol use: Yes     Comment: social   • Drug use: No         Review of Systems   REVIEW OF SYSTEMS     Review of Systems   Constitutional: Negative for chills and fever.   HENT: Negative for facial swelling and trouble swallowing.    Eyes: Negative for redness and visual disturbance.   Respiratory: Negative for chest tightness and shortness of breath.    Cardiovascular: Negative for chest pain and palpitations.   Gastrointestinal: Negative for abdominal pain, diarrhea, nausea and vomiting.   Genitourinary: Negative for difficulty urinating, dysuria, flank pain and hematuria.   Musculoskeletal: Positive for gait problem. Negative for back pain, joint swelling and neck pain.   Skin: Negative for color change and rash.   Neurological: Positive for weakness. Negative for dizziness, tremors, " seizures, syncope, facial asymmetry, speech difficulty, light-headedness and headaches.         VITALS     ED Vitals    Date/Time Temp Pulse Resp BP SpO2 [facility]   03/23/22 1307 -- 70 20 124/63 -- [facility]   03/23/22 1202 -- 72 20 148/65 98 % BM   03/23/22 1006 -- 102 18 159/82 97 % J[facility]        Pulse Ox %: 97 % (03/23/22 1127)  Pulse Ox Interpretation: Normal (03/23/22 1127)  Heart Rate: 86 (03/23/22 1127)  Rhythm Strip Interpretation: Normal Sinus Rhythm (03/23/22 1127)     Physical Exam   PHYSICAL EXAM     Physical Exam  Vitals and nursing note reviewed.   Constitutional:       General: She is not in acute distress.     Appearance: She is well-developed.   HENT:      Head: Normocephalic and atraumatic.      Right Ear: Tympanic membrane and ear canal normal.      Left Ear: Tympanic membrane and ear canal normal.      Mouth/Throat:      Mouth: Mucous membranes are moist.      Pharynx: Oropharynx is clear.   Eyes:      Conjunctiva/sclera: Conjunctivae normal.      Pupils: Pupils are equal, round, and reactive to light.   Cardiovascular:      Rate and Rhythm: Regular rhythm. Tachycardia present.      Pulses: Normal pulses.      Heart sounds: Normal heart sounds. No murmur heard.    No friction rub.   Pulmonary:      Effort: Pulmonary effort is normal. No respiratory distress.      Breath sounds: Normal breath sounds. No wheezing or rales.   Abdominal:      General: Bowel sounds are normal. There is no distension.      Palpations: Abdomen is soft.      Tenderness: There is no abdominal tenderness. There is no guarding.   Musculoskeletal:      Cervical back: Normal range of motion and neck supple.   Skin:     General: Skin is warm and dry.      Capillary Refill: Capillary refill takes less than 2 seconds.   Neurological:      General: No focal deficit present.      Mental Status: She is alert and oriented to person, place, and time.      Cranial Nerves: Cranial nerves are intact. No cranial nerve deficit.      Sensory: Sensation  is intact. No sensory deficit.      Motor: Motor function is intact.   Psychiatric:         Mood and Affect: Mood normal.           PROCEDURES     Procedures     DATA     Results     Procedure Component Value Units Date/Time    Protime-INR [[id number]]  (Normal) Collected: 03/23/22 1039    Specimen: Blood, Venous Updated: 03/23/22 1246     PT 12.8 sec      INR 1.0     Comment: INR has no defined significance when PT is within Reference Range.       APTT [[id number]]  (Normal) Collected: 03/23/22 1039    Specimen: Blood, Venous Updated: 03/23/22 1246     PTT 27 sec     UA with reflex culture [[id number]]  (Normal) Collected: 03/23/22 1159    Specimen: Urine, Clean Catch Updated: 03/23/22 1214    Narrative:      The following orders were created for panel order UA with reflex culture.  Procedure                               Abnormality         Status                     ---------                               -----------         ------                     UA Reflex to Culture (Ma...[[id number]]  Normal              Final result                 Please view results for these tests on the individual orders.    UA Reflex to Culture (Macroscopic) [[id number]]  (Normal) Collected: 03/23/22 1159    Specimen: Urine, Clean Catch Updated: 03/23/22 1214     Color, Urine Colorless     Clarity, Urine Clear     Specific Gravity, Urine 1.007     pH, Urine 6.5     Leukocyte Esterase Negative     Comment: Results can be falsely negative due to high specific gravity, some antibiotics, glucose >3 g/dl, or WBC other than neutrophils.        Nitrite, Urine Negative     Protein, Urine Negative     Glucose, Urine Negative mg/dL      Ketones, Urine Negative mg/dL      Urobilinogen, Urine 0.2 EU/dL      Bilirubin, Urine Negative mg/dL      Blood, Urine Negative     Comment: The sensitivity of the occult blood test is equivalent to approximately 4 intact RBC/HPF.       HS Troponin I (with 2 hour reflex) [[id number]]  (Normal) Collected: 03/23/22  1039    Specimen: Blood, Venous Updated: 03/23/22 1117     High Sens Troponin I 4.4 pg/mL     Comprehensive metabolic panel [[id number]]  (Abnormal) Collected: 03/23/22 1039    Specimen: Blood, Venous Updated: 03/23/22 1107     Sodium 141 mEQ/L      Potassium 3.7 mEQ/L      Comment: Results obtained on plasma. Plasma Potassium values may be up to 0.4 mEQ/L less than serum values. The differences may be greater for patients with high platelet or white cell counts.        Chloride 104 mEQ/L      CO2 25 mEQ/L      BUN 17 mg/dL      Creatinine 0.8 mg/dL      Glucose 150 mg/dL      Calcium 9.7 mg/dL      AST (SGOT) 34 IU/L      ALT (SGPT) 18 IU/L      Alkaline Phosphatase 64 IU/L      Total Protein 7.0 g/dL      Comment: Test performed on plasma which typically contains approximately 0.4 g/dL more protein than serum.        Albumin 4.4 g/dL      Bilirubin, Total 1.0 mg/dL      eGFR >60.0 mL/min/1.73m*2      Anion Gap 12 mEQ/L     CBC and differential [[id number]]  (Abnormal) Collected: 03/23/22 1039    Specimen: Blood, Venous Updated: 03/23/22 1045     WBC 5.99 K/uL      RBC 4.90 M/uL      Hemoglobin 16.6 g/dL      Hematocrit 48.7 %      MCV 99.4 fL      MCH 33.9 pg      MCHC 34.1 g/dL      RDW 12.2 %      Platelets 179 K/uL      MPV 10.9 fL      Differential Type Auto     nRBC 0.0 %      Immature Granulocytes 0.3 %      Neutrophils 59.7 %      Lymphocytes 29.9 %      Monocytes 6.7 %      Eosinophils 2.2 %      Basophils 1.2 %      Immature Granulocytes, Absolute 0.02 K/uL      Neutrophils, Absolute 3.58 K/uL      Lymphocytes, Absolute 1.79 K/uL      Monocytes, Absolute 0.40 K/uL      Eosinophils, Absolute 0.13 K/uL      Basophils, Absolute 0.07 K/uL     [facility] Draw Panel [[id number]] Collected: 03/23/22 1039    Specimen: Blood, Venous Updated: 03/23/22 1043    Narrative:      The following orders were created for panel order [facility] Draw Panel.  Procedure                               Abnormality         Status                      ---------                               -----------         ------                     BENSON HENRY[[id number]]                                  In process                   Please view results for these tests on the individual orders.    BENSON HENRY [[id number]] Collected: 03/23/22 1039    Specimen: Blood, Venous Updated: 03/23/22 1043          Imaging Results          CT HEAD WITHOUT IV CONTRAST (Final result)  Result time 03/23/22 12:55:19    Final result                 Impression:    IMPRESSION:  No evidence of acute intracranial hemorrhage, mass effect or large acute  territorial infarction by CT criteria.  Chronic ischemic and microangiopathic changes as discussed below.  If there is continued concern consider MRI.  COMMENT:    Comparison: None    TECHNIQUE: CT of the brain was performed without intravenous contrast.  CT DOSE:  One or more dose reduction techniques (e.g. automated exposure  control, adjustment of the mA and/or kV according to patient size, use of  iterative reconstruction technique) utilized for this examination.    FINDINGS:  Ventricles, sulci and basal cisterns: Normal in size and configuration.  Parenchyma: No intracranial hemorrhage, cerebral edema or mass effect. No  evidence of acute large territorial infarct. Extensive multifocal white matter  disease nonspecific probably microangiopathic. 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    Extra-axial spaces: No extra-axial fluid collection.  Imaged paranasal sinuses and mastoids: Clear.  Calvarium: Within normal limits.  Extra calvarial structures:Lens replacements bilaterally.  Miscellaneous: Atherosclerotic changes at skull base.             Narrative:    CLINICAL HISTORY: Neuro deficit, acute, stroke suspected                               X-RAY CHEST 1 VIEW (Final result)  Result time 03/23/22 12:19:54    Final result                 Impression:    IMPRESSION:  Possible patchy pleural parenchymal disease at the  left lung base as discussed  below.      COMMENT:    Comparison: None.    AP upright portable view of the chest is performed without the benefit of prior  studies for comparison. Possible mild left basilar opacity which could represent  atelectasis, artifact from overlying structures or possibly pneumonia. There may  be a small left pleural effusion. If there is further concern dedicated PA and  lateral views of the chest be considered. Minimal right basilar volume loss.  Mild apical pleural parenchymal scarring. Calcified granuloma right midlung.  Otherwise, lungs appear clear within the confines the study. No overt pulmonary  edema. Cardiac silhouette appears enlarged. Otherwise the stomach contours  grossly unremarkable. No upper abdominal findings of concern.                     Narrative:    CLINICAL HISTORY: weakness                                ECG 12 lead             Scoring tools                                 ED Course & MDM   MDM / ED COURSE / CLINICAL IMPRESSIONS / DISPO     MDM    ED Course as of 03/23/22 1331   Wed Mar 23, 2022   1231 CXR noted - No leukocytosis, cough or fevers.  [ET]   1324 D/w [facility] for admission. Dr. Freedman aware pt will be admitted, will see patient. Pt and daughter ok with plan.  [ET]      ED Course User Index  [ET] Leti Rodríguez PA C         Clinical Impressions as of 03/23/22 1331   Weakness of left lower extremity - R/O stroke/TIA     Admit / Observation         Leti Rodríguez PA C  03/23/22 1331

## 2022-03-23 NOTE — H&P
Hospital Medicine Service -  History & Physical        CHIEF COMPLAINT     Chief Complaint   Patient presents with   • Paresthesia   • Extremity Weakness        HISTORY OF PRESENT ILLNESS      89 y.o. female with a past medical history as mentioned below, lives in independent apartment at [facility], woke up at 3 AM to go to the bathroom, normally walks independently however during the nights he uses walker to go to the bathroom, felt her left leg very weak and described as would like, went back to bed and slept and woke up again at 7 AM, her left leg feeling much better from before however still feeling weird and weak, and called PCP office who referred her to come to the emergency room.    On my encounter patient reports that she still cannot lift her left lower extremity all the way up against gravity or assistant, denies any paresthesias, numbness, denies any weakness or numbness of the extremities, denies any headache, blurry vision, slurred speech denies any chest pain, lightheadedness, palpitation, fevers, cough cold congestion, shortness of breath, abdominal pain, nausea, vomiting, constipation, diarrhea, dysuria, on 12 point review of system     PAST MEDICAL AND SURGICAL HISTORY      Past Medical History:   Diagnosis Date   • Atopic keratoconjunctivitis    • Corneal ulcer of both eyes    • Femur fracture (CMS/[facility])    • Hypothyroidism    • S/P knee replacement 2012   • Shoulder fracture, left        Past Surgical History:   Procedure Laterality Date   • ADENOIDECTOMY     • CATARACT EXTRACTION     • JOINT REPLACEMENT     • KERATOPLASTY     • TONSILLECTOMY         MEDICATIONS      Prior to Admission medications    Medication Sig Start Date End Date Taking? Authorizing Provider   betamethasone valerate (VALISONE) 0.1 % cream Apply 1 application topically as needed for irritation (eczema).   Yes Provider, MD Karma   calcium carbonate-vitamin D3 600 mg-10 mcg (400 unit) capsule Take 1 tablet by mouth  2 (two) times a day.   Yes Karma Clements MD   fluorometholone (FML) 0.1 % ophthalmic suspension Administer 1 drop into the left eye 2 (two) times a day. 6/2/20  Yes Karma Clements MD   ibandronate (BONIVA) 150 mg tablet TAKE 1 TABLET EVERY 30 DAYS. TAKE IN THE MORNING WITH GLASS OF WATER PRIOR TO FOOD, DO NOT LIE DOWN FOR 30 MINUTES.  Patient taking differently: Take 150 mg by mouth every 30 (thirty) days. 9/30/21  Yes Litzy Fatima CRNP   levothyroxine (SYNTHROID) 75 mcg tablet Take 75 mcg by mouth daily.   Yes Karma Clements MD   peg 400-propylene glycol (SYSTANE ULTRA) 0.4-0.3 % drops Administer 1 drop into both eyes 3 (three) times a day as needed.   Yes Karma Clements MD   raloxifene (EVISTA) 60 mg tablet Take 60 mg by mouth daily.   Yes Karma Clements MD   valACYclovir (VALTREX) 1 gram tablet Take 1,000 mg by mouth daily. To prevent herpes infection in her eyes   Because she is on fml eye drops   Yes Karma Clements MD   bacitracin-polymyxin B (POLYSPORIN) ophthalmic ointment Apply 1 application to both eyes nightly. 6/22/21   Karma Clements MD   betamethasone valerate (VALISONE) 0.1 % cream Apply topically 2 (two) times a day. 4/9/21 3/23/22  Porsha Short, DO   calcium carbonate-vitamin D3 500 mg(1,250mg) -200 unit powder in packet Take by mouth.  3/23/22  Karma Clements MD   hydrocortisone 2.5 % ointment  5/6/20 3/23/22  Karma Clements MD   levothyroxine (SYNTHROID) 75 mcg tablet Take 1 tablet (75 mcg total) by mouth once daily.  Patient taking differently: Take 75 mcg by mouth daily. 10/19/21 3/23/22  Litzy Fatima CRNP   moxifloxacin (VIGAMOX) 0.5 % ophthalmic solution INSTILL ONE DROP INTO LEFT EYE FOUR TIMES DAILY 6/3/21 3/23/22  Karma Clements MD   polyvinyl alcohol (LIQUIFILM TEARS) 1.4 % ophthalmic solution 1 drop.  3/23/22  Karma Clements MD   prednisoLONE acetate (PRED FORTE) 1 % ophthalmic suspension  7/17/21  3/23/22  Provider, MD Karma   raloxifene 60 mg tablet Take 1 tablet (60 mg total) by mouth once daily.  Patient taking differently: Take 60 mg by mouth daily. 1/10/22 3/23/22  Litzy Fatima CRNP       ALLERGIES      Codeine, Gentamicin, and Gentamicin sulfate    FAMILY HISTORY      Family History   Problem Relation Age of Onset   • Diabetes Biological Brother    • Lung cancer Biological Brother        SOCIAL HISTORY      Social History     Socioeconomic History   • Marital status:      Spouse name: None   • Number of children: None   • Years of education: None   • Highest education level: None   Tobacco Use   • Smoking status: Never Smoker   • Smokeless tobacco: Never Used   Substance and Sexual Activity   • Alcohol use: Yes     Comment: social   • Drug use: No   • Sexual activity: Defer   Social History Narrative    [facility] independent apartment      Independent with [employer]LS    Walk with walker only at nights     Social Determinants of Health     Food Insecurity: No Food Insecurity   • Worried About Running Out of Food in the Last Year: Never true   • Ran Out of Food in the Last Year: Never true       REVIEW OF SYSTEMS        12 point review of system denies any symptoms other than her left leg still feeling weak, intact sensation.      PHYSICAL EXAMINATION      Temp:  [36.7 °C (98.1 °F)] 36.7 °C (98.1 °F)  Heart Rate:  [] 72  Resp:  [15-20] 16  BP: (101-159)/(58-82) 121/58  Body mass index is 24.69 kg/m².    General exam : appears age stated, well nourished, not in distress  Head: atraumatic, normocephalic  Eyes impaired vision in bilateral eyes.  EOMI, PERRLA  ENT: no lesions, oropharynx pink, mucous membranes moist   Neck: supple, no Lymph nodes, no Thyromegaly, no JVD   CVS : normal rate, normal rhythm, S1 and S2 heard, no murmurs, rubs or gallops  Resp:normal accessory muscle usage, clear to auscultation Bilaterally  Abdomen : soft, Nt, BS +, no organomegaly   Extremities  : no edema, no cyanosis   MSK: ++ DJD, no joint swellings, no joint tenderness   Skin: intact, warm, no rash  Neuro: AAO x3, CN 2-12 intact, 5 out of 5 motor strength in all extremities except in left lower extremity is 4 out of 5, intact sensations, 1+ DTRs, coordination intact.  Psych: normal mood.cooperative      LABS / IMAGING / EKG        Labs  CBC Results       03/23/22 02/24/22 01/18/22     1039 0735 0800    WBC 5.99 6.93 6.36    RBC 4.90 4.40 4.34    HGB 16.6 15.0 14.7    HCT 48.7 45.3 44.6    MCV 99.4 103.0 102.8    MCH 33.9 34.1 33.9    MCHC 34.1 33.1 33.0     199 188        CMP Results       03/23/22 02/24/22 01/18/22     1039 0735 0800     142 140    K 3.7 4.3 4.3    Cl 104 106 108    CO2 25 22 23    Glucose 150 101 105    BUN 17 23 19    Creatinine 0.8 0.9 1.0    Calcium 9.7 9.3 9.3    Anion Gap 12 14 9    AST 34 26 28    ALT 18 14 17    Albumin 4.4 3.5 3.4    EGFR >60.0 59.1 52.3         Comment for K at 1039 on 03/23/22: Results obtained on plasma. Plasma Potassium values may be up to 0.4 mEQ/L less than serum values. The differences may be greater for patients with high platelet or white cell counts.          Troponin I Results       03/23/22 03/23/22     1307 1039    HS Troponin I 4.4 4.4        Microbiology Results     Procedure Component Value Units Date/Time    SARS-CoV-2 (COVID-19), PCR Nasopharynx [[id number]]  (Normal) Collected: 03/23/22 1519    Specimen: Nasopharyngeal Swab from Nasopharynx Updated: 03/23/22 1600    Narrative:      The following orders were created for panel order SARS-CoV-2 (COVID-19), PCR Nasopharynx.  Procedure                               Abnormality         Status                     ---------                               -----------         ------                     SARS-CoV-2 (COVID-19), P...[[id number]]  Normal              Final result                 Please view results for these tests on the individual orders.    SARS-CoV-2 (COVID-19), PCR  Nasopharynx [[id number]]  (Normal) Collected: 03/23/22 1519    Specimen: Nasopharyngeal Swab from Nasopharynx Updated: 03/23/22 1600     SARS-CoV-2 (COVID-19) Negative    Narrative:      Nursing instructions: Obtain nasopharyngeal swab ONLY.  Send swab in viral transport media. If RSV/FLU swab is also ordered, both Covid and RSV/FLU can be performed on single swab.        UA Results       03/23/22     1159    Color Colorless    Clarity Clear    Glucose Negative    Bilirubin Negative    Ketones Negative    Sp Grav 1.007    Blood Negative    Ph 6.5    Protein Negative    Urobilinogen 0.2    Nitrite Negative    Leuk Est Negative         Comment for Blood at 1159 on 03/23/22: The sensitivity of the occult blood test is equivalent to approximately 4 intact RBC/HPF.    Comment for Leuk Est at 1159 on 03/23/22: Results can be falsely negative due to high specific gravity, some antibiotics, glucose >3 g/dl, or WBC other than neutrophils.          Imaging  CT HEAD WITHOUT IV CONTRAST   Final Result   IMPRESSION:   No evidence of acute intracranial hemorrhage, mass effect or large acute   territorial infarction by CT criteria.   Chronic ischemic and microangiopathic changes as discussed below.   If there is continued concern consider MRI.   COMMENT:      Comparison: None      TECHNIQUE: CT of the brain was performed without intravenous contrast.   CT DOSE:  One or more dose reduction techniques (e.g. automated exposure   control, adjustment of the mA and/or kV according to patient size, use of   iterative reconstruction technique) utilized for this examination.      FINDINGS:   Ventricles, sulci and basal cisterns: Normal in size and configuration.   Parenchyma: No intracranial hemorrhage, cerebral edema or mass effect. No   evidence of acute large territorial infarct. Extensive multifocal white matter   disease nonspecific probably microangiopathic. 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      Extra-axial  spaces: No extra-axial fluid collection.   Imaged paranasal sinuses and mastoids: Clear.   Calvarium: Within normal limits.   Extra calvarial structures:Lens replacements bilaterally.   Miscellaneous: Atherosclerotic changes at skull base.      X-RAY CHEST 1 VIEW   Final Result   IMPRESSION:   Possible patchy pleural parenchymal disease at the left lung base as discussed   below.         COMMENT:      Comparison: None.      AP upright portable view of the chest is performed without the benefit of prior   studies for comparison. Possible mild left basilar opacity which could represent   atelectasis, artifact from overlying structures or possibly pneumonia. There may   be a small left pleural effusion. If there is further concern dedicated PA and   lateral views of the chest be considered. Minimal right basilar volume loss.   Mild apical pleural parenchymal scarring. Calcified granuloma right midlung.   Otherwise, lungs appear clear within the confines the study. No overt pulmonary   edema. Cardiac silhouette appears enlarged. Otherwise the stomach contours   grossly unremarkable. No upper abdominal findings of concern.                  ECG 12 lead   Final Result      MRI BRAIN WITHOUT CONTRAST    (Results Pending)   MRI ANGIOGRAM NECK WITHOUT CONTRAST    (Results Pending)   MRI ANGIOGRAM HEAD WITHOUT CONTRAST    (Results Pending)         ECG/Telemetry  reviewed by me  Sinus rhythm with PVCs    ASSESSMENT AND PLAN           * Transient left leg weakness  Assessment & Plan  Patient reporting to the emergency room when she woke up at 3:00 in the morning when her left leg felt like word, had difficulty walking though she was walking with a walker, went back to bed and slept and at 7 AM she felt improved symptoms but felt very weird and still weak.  On arrival according to the ER provider NIH score was 0  Due to unknown time of onset not a candidate for TPA  CAT scan of the head unremarkable  Admitting for stroke  work-up  Ordered MRI brain  Received loading dose of aspirin  Consulted neurology  Continue with aspirin 81 mg daily  Checking lipid profile in the morning.  Continue with neurochecks and NIH score.      Osteoporosis  Assessment & Plan  Patient takes Boniva every month    Hypokalemia  Assessment & Plan  Replete potassium as needed  Checking a.m. labs    Hypothyroidism  Assessment & Plan  Continue levothyroxine from home    Atopic keratoconjunctivitis  Assessment & Plan  Continue eyedrops from home  Continue prophylactic valacyclovir from home    Anticipate discharge in next 24 hours if medically stable      VTE Assessment: Padua  3  VTE Prophylaxis Plan: SCDs  Code Status: Full Code       Racquel Renteria MD  3/23/2022\

## 2022-03-23 NOTE — TELEPHONE ENCOUNTER
Pt calling I awoke with a strange feeling I got in middle of night almost fell knee gave away my leg felt funny I can move it ; the leg feels wooden ; pt is using her walker   Pt asking to be seen  ss she does not know what this feeling is   This very suddenly came on I don't what to do about it   Pt is frightened about this     Please advise  P/c  Pt  [phone]

## 2022-03-23 NOTE — CONSULTS
"Neurology Consult Note    Subjective     The patient is an 89 y.o. right handed female with a past medical history of hypothyroidism, left femur fracture status post surgery, multiple left knee surgeries, left shoulder fracture, and left elbow surgery, who presented to the [facility] ED this morning with left leg weakness.      The patient reports that she was at her baseline when she went to bed around 11 pm last night. When waking up at 3am to use the bathroom, her left leg felt weak which made it difficult to walk. She then went back to bed. When she woke up again around 7am, her left leg still felt weak and \"felt like wood\" from the left knee down. She then called her PCP, who instructed her to come to the ED for evaluation. In the ED, the patient was given  mg x1.     The patient was seen and examined in the ED this afternoon. She reports that her left leg still doesn't feel normal at this time. No back or leg pain. No limb numbness. The patient denies any headaches, dizziness, speech changes, new visual disturbances, trouble with swallowing, or weakness in other 3 extremities. At baseline, the patient uses a walker when outside her home and overnight to go to the bathroom.     She denies prior history of stroke/TIA. No reported history of hypertension, diabetes mellitus, hyperlipidemia, or atrial fibrillation. She doesn't take ASA, nor statin at home.     Her blood pressure was 159/82 when arrival to the ED last night.     Medical History:   Past Medical History:   Diagnosis Date   • Atopic keratoconjunctivitis    • Corneal ulcer of both eyes    • Femur fracture (CMS/[facility])    • Hypothyroidism    • S/P knee replacement 2012   • Shoulder fracture, left        Surgical History:   Past Surgical History:   Procedure Laterality Date   • ADENOIDECTOMY     • CATARACT EXTRACTION     • JOINT REPLACEMENT     • KERATOPLASTY     • TONSILLECTOMY         Allergies: Codeine, Gentamicin, and Gentamicin " sulfate    Home Medications:  Not in a hospital admission.    Current Facility-Administered Medications   Medication Dose Route Frequency Provider Last Rate Last Admin   • acetaminophen (TYLENOL) tablet 650 mg  650 mg oral q4h PRN Racquel Renteria MD       • artificial tears (dextran-hpm-glyc) (GENTEAL TEARS) 0.1-0.3-0.2 % eye drops 1 drop  1 drop Both Eyes 3x daily PRN Racquel Renteria MD       • glucose chewable tablet 16-32 g of dextrose  16-32 g of dextrose oral PRN Racquel Renteria MD        Or   • dextrose 40 % oral gel 15-30 g of dextrose  15-30 g of dextrose oral PRN Racquel Renteria MD        Or   • glucagon (GLUCAGEN) injection 1 mg  1 mg intramuscular PRN Racquel Renteria MD        Or   • dextrose in water injection 12.5 g  25 mL intravenous PRN Racquel Renteria MD       • fluorometholone (FML) 0.1 % ophthalmic suspension 1 drop  1 drop Both Eyes BID Racquel Renteria MD       • levothyroxine (SYNTHROID) tablet 75 mcg  75 mcg oral Daily (6:30a) Racquel Renteria MD       • raloxifene (EVISTA) tablet 60 mg  60 mg oral Daily (6a) Racquel Renteria MD       • valACYclovir (VALTREX) tablet 500 mg  500 mg oral Daily Racquel Renteria MD         Current Outpatient Medications   Medication Sig Dispense Refill   • fluorometholone (FML) 0.1 % ophthalmic suspension Administer 1 drop into the left eye 2 (two) times a day.     • ibandronate (BONIVA) 150 mg tablet TAKE 1 TABLET EVERY 30 DAYS. TAKE IN THE MORNING WITH GLASS OF WATER PRIOR TO FOOD, DO NOT LIE DOWN FOR 30 MINUTES. 3 tablet 2   • levothyroxine (SYNTHROID) 75 mcg tablet Take 1 tablet (75 mcg total) by mouth once daily. 90 tablet 1   • peg 400-propylene glycol (SYSTANE ULTRA) 0.4-0.3 % drops Administer 1 drop into both eyes 3 (three) times a day as needed.     • raloxifene 60 mg tablet Take 1 tablet (60 mg total) by mouth once daily. 90 tablet 0   • valACYclovir 500 mg tablet Take 500 mg by mouth daily.     • bacitracin-polymyxin B (POLYSPORIN) ophthalmic ointment      •  calcium carbonate-vitamin D3 500 mg(1,250mg) -200 unit powder in packet Take by mouth.     • hydrocortisone 2.5 % ointment          Social History:   Social History     Socioeconomic History   • Marital status:      Spouse name: None   • Number of children: None   • Years of education: None   • Highest education level: None   Tobacco Use   • Smoking status: Never Smoker   • Smokeless tobacco: Never Used   Substance and Sexual Activity   • Alcohol use: Yes     Comment: social   • Drug use: No   • Sexual activity: Defer   Social History Narrative    Lives at home     Independent with ADLS    Walk with walker only at nights     Social Determinants of Health     Food Insecurity: No Food Insecurity   • Worried About Running Out of Food in the Last Year: Never true   • Ran Out of Food in the Last Year: Never true     Family History:   Family History   Problem Relation Age of Onset   • Diabetes Biological Brother    • Lung cancer Biological Brother      Review of Systems:  All other systems reviewed and negative except as noted in the HPI.    Objective     Vital signs in last 24 hours:  Heart Rate:  [] 72  Resp:  [17-20] 17  BP: (124-159)/(62-82) 149/62    Visit Vitals  BP (!) 149/62 (BP Location: Right upper arm, Patient Position: Lying)   Pulse 72   Resp 17   SpO2 97%     Physical Exam:    General Appearance: Awake. Not in Acute Distress.   Head: Normocephalic, atraumatic.   Eyes: Pupils were about equal and reacted to light. EOMI.   Neck: Supple, no nuchal rigidity. No apparent bruits.   Respiratory: CTA.   Cardiovascular: Regular rate and rhythm.   Gastrointestinal: Soft, + Bowel sounds.   Extremities: No edema. Old surgical scars in the left thigh and left knee.   Skin: Dry.   Neurologic:  MS: Awake, alert, and oriented to person, place, and time. Speech was clear. No aphasia. No dysarthria. CN: Pupils were about equal and reacted to light. EOMI. No apparent visual field defects by confrontation testing.  No nystagmus or diplopia. Facial PP sensation was grossly normal. No clear facial weakness. Tongue protruded midline. PP sensation was grossly normal bilaterally. Motor: No drift in B/L UEs. No weakness in B/L UEs except mildly decreased ROM in her left shoulder (Baseline per the patient, history of left shoulder fracture). No drift/No weakness in RLE. LLE with slight weakness as compared to RLE. No obviously increased muscle tone. DTRs: About 2+ in UEs and at the right knee. Left knee reflex was unable to be elicited, status post multiple left knee surgeries. No ankle clonus.Toes were downgoing B/L. Coordination: Finger to nose was intact bilaterally.   Gait: Not tested at this time.    Behavior/Emotional: Appropriate, cooperative.     LABS:  Results for orders placed or performed during the hospital encounter of 03/23/22   CBC and differential   Result Value Ref Range    WBC 5.99 3.80 - 10.50 K/uL    RBC 4.90 3.93 - 5.22 M/uL    Hemoglobin 16.6 (H) 11.8 - 15.7 g/dL    Hematocrit 48.7 (H) 35.0 - 45.0 %    MCV 99.4 (H) 83.0 - 98.0 fL    MCH 33.9 (H) 28.0 - 33.2 pg    MCHC 34.1 32.2 - 35.5 g/dL    RDW 12.2 11.7 - 14.4 %    Platelets 179 150 - 369 K/uL    MPV 10.9 9.4 - 12.3 fL    Differential Type Auto     nRBC 0.0 <=0.0 %    Immature Granulocytes 0.3 %    Neutrophils 59.7 %    Lymphocytes 29.9 %    Monocytes 6.7 %    Eosinophils 2.2 %    Basophils 1.2 %    Immature Granulocytes, Absolute 0.02 0.00 - 0.08 K/uL    Neutrophils, Absolute 3.58 1.70 - 7.00 K/uL    Lymphocytes, Absolute 1.79 1.20 - 3.50 K/uL    Monocytes, Absolute 0.40 0.28 - 0.80 K/uL    Eosinophils, Absolute 0.13 0.04 - 0.36 K/uL    Basophils, Absolute 0.07 0.01 - 0.10 K/uL   Comprehensive metabolic panel   Result Value Ref Range    Sodium 141 136 - 144 mEQ/L    Potassium 3.7 3.6 - 5.1 mEQ/L    Chloride 104 98 - 109 mEQ/L    CO2 25 22 - 32 mEQ/L    BUN 17 8 - 20 mg/dL    Creatinine 0.8 0.6 - 1.1 mg/dL    Glucose 150 (H) 70 - 99 mg/dL    Calcium 9.7 8.9 -  10.3 mg/dL    AST (SGOT) 34 15 - 41 IU/L    ALT (SGPT) 18 11 - 54 IU/L    Alkaline Phosphatase 64 35 - 126 IU/L    Total Protein 7.0 6.0 - 8.2 g/dL    Albumin 4.4 3.4 - 5.0 g/dL    Bilirubin, Total 1.0 0.3 - 1.2 mg/dL    eGFR >60.0 >=60.0 mL/min/1.73m*2    Anion Gap 12 3 - 15 mEQ/L   HS Troponin I (with 2 hour reflex)   Result Value Ref Range    High Sens Troponin I 4.4 <15 pg/mL   Protime-INR   Result Value Ref Range    PT 12.8 12.2 - 14.5 sec    INR 1.0     APTT   Result Value Ref Range    PTT 27 23 - 35 sec   UA Reflex to Culture (Macroscopic)    Specimen: Urine, Clean Catch   Result Value Ref Range    Color, Urine Colorless Yellow, Colorless    Clarity, Urine Clear Clear    Specific Gravity, Urine 1.007 1.005 - 1.030    pH, Urine 6.5 4.5 - 8.0    Leukocyte Esterase Negative Negative    Nitrite, Urine Negative Negative    Protein, Urine Negative Negative    Glucose, Urine Negative Negative mg/dL    Ketones, Urine Negative Negative mg/dL    Urobilinogen, Urine 0.2 <2.0 EU/dL EU/dL    Bilirubin, Urine Negative Negative mg/dL    Blood, Urine Negative Negative   HIGH SENSITIVE TROPONIN I (NO REFLEX)   Result Value Ref Range    High Sens Troponin I 4.4 <15 pg/mL     I reviewed the lab results.    ECG/TELEMETRY/ECHO:  ECG 3/23/2022:  Sinus rhythm with occasional Premature ventricular complexes   Minimal voltage criteria for LVH, may be normal variant if age is < 37 yrs   Cannot rule out Anterior infarct , age undetermined   No previous ECGs available     IMAGING:  CT HEAD WITHOUT IV CONTRAST    Result Date: 3/23/2022  IMPRESSION: No evidence of acute intracranial hemorrhage, mass effect or large acute territorial infarction by CT criteria. Chronic ischemic and microangiopathic changes as discussed below. If there is continued concern consider MRI. COMMENT: Comparison: None TECHNIQUE: CT of the brain was performed without intravenous contrast. CT DOSE:  One or more dose reduction techniques (e.g. automated exposure  control, adjustment of the mA and/or kV according to patient size, use of iterative reconstruction technique) utilized for this examination. FINDINGS: Ventricles, sulci and basal cisterns: Normal in size and configuration. Parenchyma: No intracranial hemorrhage, cerebral edema or mass effect. No evidence of acute large territorial infarct. Extensive multifocal white matter disease nonspecific probably microangiopathic. Tiny old right lateral cerebellar lacunar infarcts. Extra-axial spaces: No extra-axial fluid collection. Imaged paranasal sinuses and mastoids: Clear. Calvarium: Within normal limits. Extra calvarial structures:Lens replacements bilaterally. Miscellaneous: Atherosclerotic changes at skull base.    X-RAY CHEST 1 VIEW    Result Date: 3/23/2022  IMPRESSION: Possible patchy pleural parenchymal disease at the left lung base as discussed below. COMMENT: Comparison: None. AP upright portable view of the chest is performed without the benefit of prior studies for comparison. Possible mild left basilar opacity which could represent atelectasis, artifact from overlying structures or possibly pneumonia. There may be a small left pleural effusion. If there is further concern dedicated PA and lateral views of the chest be considered. Minimal right basilar volume loss. Mild apical pleural parenchymal scarring. Calcified granuloma right midlung. Otherwise, lungs appear clear within the confines the study. No overt pulmonary edema. Cardiac silhouette appears enlarged. Otherwise the stomach contours grossly unremarkable. No upper abdominal findings of concern.     I personally reviewed the patient's neuroimaging study.    Assessment and Plan:    Assessment   89 y.o. female with a past medical history of hypothyroidism, left femur fracture status post surgery, multiple left knee surgeries, left shoulder fracture, and left elbow surgery, was evaluated by neurology this afternoon regarding left leg weakness. No other  associated neurological symptoms reported. Besides slight left lower extremity weakness, neurological exam showed no other definite findings. Her history of left femur fracture status post surgery and multiple left knee surgeries should be taken into account for this finding. Her left lower extremity weakness may be related to the above left lower extremity orthopedic conditions/surgeries, although an acute stroke cannot be excluded completely.     Head CT showed no acute intracranial abnormality.     Plan   -Agree with MRI brain to rule out acute stroke. Further stroke work-up pending MRI brain findings.  -Cardiac monitoring while in the hospital.   -The patient was given  mg x1 in the ED. OK for ASA 81 mg daily starting tomorrow pending MRI brain.  -Check HbA1c and FLP.  -No reported history of HTN. Monitor blood pressure.   -PT evaluation.   -The rest of medical and supportive care per the primary team.  -Care plan was discussed with the patient. Will discuss with attending Dr. Renteria.     Thank you for allowing me to participate in the care of this patient. If you have any further questions, please do not hesitate to contact me.    Face to face patient counseling and coordinating care > 50% encounter time.  Total encounter time: 70 minutes.    Josselyn Mederos MD  3/23/2022

## 2022-03-23 NOTE — ED ATTESTATION NOTE
I have personally seen and examined the patient.  I reviewed and agree with physician assistant / nurse practitioner’s assessment and plan of care, with the following exceptions: None  My examination, assessment, and plan of care of Catia Ward is as follows:   pt presents to ED for possible TIA. Pt states her left leg felt like wood at 3am today when she went to use bathroom. Pt states it did not go away when she got up again after going back to sleep. Pt denies headache, chest pain, fever, cough.    Pt is awake, alert and oriented x3. Pt has 5/5 b/l  strength. Pt able to left both feet off bed but states she lift left leg less bc of prior left knee problem. Ct brain unremarkable, cxr with patchy changes at left lung base, wbc, pulse ox, ua, hs troponin, CMP unremarkable. Pt will be admitted for TIA and neurology evaluation.     I was physically present for the key/critical portions of the following procedures: None       Xavier Freedman MD  03/23/22 2283

## 2022-03-24 ENCOUNTER — APPOINTMENT (OUTPATIENT)
Dept: RADIOLOGY | Facility: HOSPITAL | Age: 87
Setting detail: OBSERVATION
DRG: 042 | End: 2022-03-24
Attending: INTERNAL MEDICINE
Payer: MEDICARE

## 2022-03-24 ENCOUNTER — APPOINTMENT (INPATIENT)
Dept: CARDIOLOGY | Facility: HOSPITAL | Age: 87
DRG: 042 | End: 2022-03-24
Attending: NURSE PRACTITIONER
Payer: MEDICARE

## 2022-03-24 ENCOUNTER — APPOINTMENT (OUTPATIENT)
Dept: RADIOLOGY | Facility: HOSPITAL | Age: 87
Setting detail: OBSERVATION
DRG: 042 | End: 2022-03-24
Attending: NURSE PRACTITIONER
Payer: MEDICARE

## 2022-03-24 ENCOUNTER — APPOINTMENT (INPATIENT)
Dept: RADIOLOGY | Facility: HOSPITAL | Age: 87
DRG: 042 | End: 2022-03-24
Attending: NURSE PRACTITIONER
Payer: MEDICARE

## 2022-03-24 PROBLEM — I63.9 ACUTE CVA (CEREBROVASCULAR ACCIDENT) (CMS/HCC): Status: ACTIVE | Noted: 2022-03-24

## 2022-03-24 LAB
ANION GAP SERPL CALC-SCNC: 12 MEQ/L (ref 3–15)
AORTIC ROOT ANNULUS - M-MODE: 2.7 CM
AORTIC VALVE MEAN VELOCITY: 1.09 M/S
AORTIC VALVE VELOCITY TIME INTEGRAL: 29.8 CM
ASCENDING AORTA: 2.8 CM
AV MEAN GRADIENT: 6 MMHG
AV PEAK GRADIENT: 11 MMHG
AV PEAK VELOCITY-S: 1.64 M/S
AV REG PEAK VEL: 3.91 M/S
AV REGURGITATION PRESSURE HALF TIME: 374 MS
BSA FOR ECHO PROCEDURE: 1.64 M2
BUN SERPL-MCNC: 17 MG/DL (ref 8–20)
CALCIUM SERPL-MCNC: 9.1 MG/DL (ref 8.9–10.3)
CHLORIDE SERPL-SCNC: 105 MEQ/L (ref 98–109)
CHOLEST SERPL-MCNC: 186 MG/DL
CO2 SERPL-SCNC: 24 MEQ/L (ref 22–32)
CREAT SERPL-MCNC: 0.8 MG/DL (ref 0.6–1.1)
E WAVE DECELERATION TIME: 246 MS
E/A RATIO: 0.6
E/E' RATIO: 6.5
E/LAT E' RATIO: 4.5
EDV (BP): 47.3 CM3
EF (A4C): 71.2 %
EF A2C: 80.9 %
EJECTION FRACTION: 76.5 %
EST RIGHT VENT SYSTOLIC PRESSURE BY TRICUSPID REGURGITATION JET: 26 MMHG
ESV (BP): 11.1 CM3
FRACTIONAL SHORTENING: 44 %
GFR SERPL CREATININE-BSD FRML MDRD: >60 ML/MIN/1.73M*2
GLUCOSE SERPL-MCNC: 87 MG/DL (ref 70–99)
HDLC SERPL-MCNC: 59 MG/DL
HDLC SERPL: 3.2 {RATIO}
INTERVENTRICULAR SEPTUM: 0.74 CM
LA ESV (BP): 32.1 CM3
LA ESV INDEX (A2C): 22.93 CM3/M2
LA ESV INDEX (BP): 19.57 CM3/M2
LAAS-AP2: 15.5 CM2
LAAS-AP4: 11.6 CM2
LAD 2D: 3.3 CM
LALD A4C: 4.34 CM
LALD A4C: 5.19 CM
LAV-S: 37.6 CM3
LDLC SERPL CALC-MCNC: 116 MG/DL
LEFT ATRIUM VOLUME INDEX: 14.09 CM3/M2
LEFT ATRIUM VOLUME: 23.1 CM3
LEFT INTERNAL DIMENSION IN SYSTOLE: 2.02 CM (ref 2.31–3.5)
LEFT VENTRICLE DIASTOLIC VOLUME INDEX: 26.46 CM3/M2
LEFT VENTRICLE DIASTOLIC VOLUME: 43.4 CM3
LEFT VENTRICLE SYSTOLIC VOLUME INDEX: 7.62 CM3/M2
LEFT VENTRICLE SYSTOLIC VOLUME: 12.5 CM3
LEFT VENTRICULAR INTERNAL DIMENSION IN DIASTOLE: 3.61 CM (ref 3.89–5.4)
LEFT VENTRICULAR POSTERIOR WALL IN END DIASTOLE: 0.88 CM (ref 0.5–0.94)
LV DIASTOLIC VOLUME: 52.8 CM3
LV ESV (APICAL 2 CHAMBER): 10.1 CM3
LVAD-AP2: 20.7 CM2
LVAD-AP4: 19.2 CM2
LVAS-AP2: 7.84 CM2
LVAS-AP4: 8.49 CM2
LVEDVI(A2C): 32.2 CM3/M2
LVEDVI(BP): 28.84 CM3/M2
LVESVI(A2C): 6.16 CM3/M2
LVESVI(BP): 6.77 CM3/M2
LVLD-AP2: 6.81 CM
LVLD-AP4: 6.81 CM
LVLS-AP2: 5.24 CM
LVLS-AP4: 5.18 CM
LVOT MG: 4 MMHG
LVOT MV: 0.94 M/S
LVOT PEAK VELOCITY: 1.46 M/S
LVOT VTI: 23.3 CM
MV E'TISSUE VEL-LAT: 0.12 M/S
MV E'TISSUE VEL-MED: 0.08 M/S
MV MEAN GRADIENT: 2 MMHG
MV PEAK A VEL: 0.86 M/S
MV PEAK E VEL: 0.52 M/S
MV PEAK GRADIENT: 4 MMHG
MV VTI: 22.8 CM
NONHDLC SERPL-MCNC: 127 MG/DL
POSTERIOR WALL: 0.88 CM
POTASSIUM SERPL-SCNC: 3.9 MEQ/L (ref 3.6–5.1)
PULM VEIN S/D RATIO: 2.2
PV PEAK D VEL: 0.28 M/S
PV PEAK S VEL: 0.62 M/S
SODIUM SERPL-SCNC: 141 MEQ/L (ref 136–144)
TR MAX PG: 23 MMHG
TRICUSPID VALVE PEAK REGURGITATION VELOCITY: 2.42 M/S
TRIGL SERPL-MCNC: 57 MG/DL (ref 30–149)
Z-SCORE OF LEFT VENTRICULAR DIMENSION IN END DIASTOLE: -2.39
Z-SCORE OF LEFT VENTRICULAR DIMENSION IN END SYSTOLE: -2.7
Z-SCORE OF LEFT VENTRICULAR POSTERIOR WALL IN END DIASTOLE: 1.3

## 2022-03-24 PROCEDURE — 70544 MR ANGIOGRAPHY HEAD W/O DYE: CPT | Mod: ME

## 2022-03-24 PROCEDURE — 80048 BASIC METABOLIC PNL TOTAL CA: CPT | Performed by: INTERNAL MEDICINE

## 2022-03-24 PROCEDURE — G0378 HOSPITAL OBSERVATION PER HR: HCPCS

## 2022-03-24 PROCEDURE — 73564 X-RAY EXAM KNEE 4 OR MORE: CPT | Mod: LT

## 2022-03-24 PROCEDURE — 63700000 HC SELF-ADMINISTRABLE DRUG: Performed by: NURSE PRACTITIONER

## 2022-03-24 PROCEDURE — 99233 SBSQ HOSP IP/OBS HIGH 50: CPT | Performed by: INTERNAL MEDICINE

## 2022-03-24 PROCEDURE — 93306 TTE W/DOPPLER COMPLETE: CPT

## 2022-03-24 PROCEDURE — 63700000 HC SELF-ADMINISTRABLE DRUG: Performed by: INTERNAL MEDICINE

## 2022-03-24 PROCEDURE — G1004 CDSM NDSC: HCPCS

## 2022-03-24 PROCEDURE — 63600000 HC DRUGS/DETAIL CODE: Performed by: NURSE PRACTITIONER

## 2022-03-24 PROCEDURE — 70551 MRI BRAIN STEM W/O DYE: CPT | Mod: MG

## 2022-03-24 PROCEDURE — 99233 SBSQ HOSP IP/OBS HIGH 50: CPT | Performed by: PSYCHIATRY & NEUROLOGY

## 2022-03-24 PROCEDURE — 93306 TTE W/DOPPLER COMPLETE: CPT | Mod: 26 | Performed by: INTERNAL MEDICINE

## 2022-03-24 PROCEDURE — 20600000 HC ROOM AND CARE INTERMEDIATE/TELEMETRY

## 2022-03-24 PROCEDURE — 80061 LIPID PANEL: CPT | Performed by: INTERNAL MEDICINE

## 2022-03-24 PROCEDURE — 36415 COLL VENOUS BLD VENIPUNCTURE: CPT | Performed by: INTERNAL MEDICINE

## 2022-03-24 RX ORDER — ATORVASTATIN CALCIUM 40 MG/1
40 TABLET, FILM COATED ORAL
Status: DISCONTINUED | OUTPATIENT
Start: 2022-03-24 | End: 2022-03-25 | Stop reason: HOSPADM

## 2022-03-24 RX ORDER — LORAZEPAM 0.5 MG/1
0.5 TABLET ORAL ONCE AS NEEDED
Status: DISCONTINUED | OUTPATIENT
Start: 2022-03-24 | End: 2022-03-25 | Stop reason: HOSPADM

## 2022-03-24 RX ORDER — HEPARIN SODIUM 5000 [USP'U]/ML
5000 INJECTION, SOLUTION INTRAVENOUS; SUBCUTANEOUS EVERY 8 HOURS
Status: DISCONTINUED | OUTPATIENT
Start: 2022-03-24 | End: 2022-03-25 | Stop reason: HOSPADM

## 2022-03-24 RX ORDER — ASPIRIN 81 MG/1
81 TABLET ORAL DAILY
Status: DISCONTINUED | OUTPATIENT
Start: 2022-03-24 | End: 2022-03-25 | Stop reason: HOSPADM

## 2022-03-24 RX ADMIN — ASPIRIN 81 MG: 81 TABLET, COATED ORAL at 08:56

## 2022-03-24 RX ADMIN — ATORVASTATIN CALCIUM 40 MG: 40 TABLET, FILM COATED ORAL at 18:53

## 2022-03-24 RX ADMIN — FLUOROMETHOLONE 1 DROP: 1 SOLUTION/ DROPS OPHTHALMIC at 08:54

## 2022-03-24 RX ADMIN — HEPARIN SODIUM 5000 UNITS: 5000 INJECTION, SOLUTION INTRAVENOUS; SUBCUTANEOUS at 22:03

## 2022-03-24 RX ADMIN — RALOXIFENE 60 MG: 60 TABLET ORAL at 06:14

## 2022-03-24 RX ADMIN — VALACYCLOVIR HYDROCHLORIDE 1000 MG: 1 TABLET, FILM COATED ORAL at 08:53

## 2022-03-24 RX ADMIN — LEVOTHYROXINE SODIUM 75 MCG: 75 TABLET ORAL at 06:14

## 2022-03-24 RX ADMIN — HEPARIN SODIUM 5000 UNITS: 5000 INJECTION, SOLUTION INTRAVENOUS; SUBCUTANEOUS at 18:59

## 2022-03-24 RX ADMIN — FLUOROMETHOLONE 1 DROP: 1 SOLUTION/ DROPS OPHTHALMIC at 20:15

## 2022-03-24 NOTE — PROGRESS NOTES
Addendum 3PM:  -MRI brain showed small acute ischemia in the right centrum semiovale extending to the right precentral gyrus subcortical white matter and punctate focus of acute ischemia along the left postcentral gyrus cortex, concerning for an embolic event. I personally reviewed the patient's MRI brain study.  -Check MRA of head and neck.  -Cardiac monitoring. Check TTE. If TTE is unremarkable and tele remains unrevealing, consult cardiology for further cardioembolic source workup such as implantable loop recorder.  -Continue ASA 81 mg daily.  -Continue statin therapy.  -I discussed the above with YFN Dumont this afternoon.     Josselyn Mederos MD   3/24/2022

## 2022-03-24 NOTE — PROGRESS NOTES
Hospital Medicine Service -  Daily Progress Note       SUBJECTIVE   Interval History: Pt seen in am rounds, discussed with Neurology. Suspected left side weakness more related to chronic issues with left knee. Continue stroke protocol pending MRI results. ASA started and statin.   XRAY left knee with no acute issues.   MRI results pending for further plan.   OBJECTIVE      Vital signs in last 24 hours:  Temp:  [36.4 °C (97.5 °F)-36.9 °C (98.5 °F)] 36.4 °C (97.6 °F)  Heart Rate:  [61-83] 62  Resp:  [15-18] 18  BP: (101-153)/(54-67) 118/61  No intake or output data in the 24 hours ending 03/24/22 1319    PHYSICAL EXAMINATION          General:  Alert, cooperative, no distress, appears stated age   Head:  Normocephalic, atraumatic   Eyes:  PERRLA, conjunctiva clear, sclera anicteric, EOMI BL, impaired vision as per baseling   Mouth/Throat:  Mucosa moist, pharynx clear   Neck:  Supple, symmetrical, trachea midline, no adenopathy, no masses appreciated   Lungs:  Respirations unlabored, clear to auscultation bilaterally   Heart:  RRR, S1 and S2 normal, no m/r/g   Abdomen:  Soft, non-tender, (+) BS, ND   Extremities:  Extremities normal, atraumatic, no cyanosis,  No edema, left knee surgical scars from multiple surgeries   Skin:  Skin clean, dry, intact. Color normal.    Neurologic:  AAOx3. Grossly non-focal, power symmetric, no sensory deficits, no droop, no drift   Behavior/Emotional  Appropriate, cooperative       LINES, CATHETERS, DRAINS, AIRWAYS, AND WOUNDS   Lines, Drains, Airways, Wounds:  Peripheral IV (Adult) 03/23/22 Right Antecubital (Active)   Number of days: 1       Comments:         LABS / IMAGING / TELE      Labs  Results from last 7 days   Lab Units 03/24/22  0420 03/23/22  1039   SODIUM mEQ/L 141 141   POTASSIUM mEQ/L 3.9 3.7   CHLORIDE mEQ/L 105 104   CO2 mEQ/L 24 25   BUN mg/dL 17 17   CREATININE mg/dL 0.8 0.8   CALCIUM mg/dL 9.1 9.7   ALBUMIN g/dL  --  4.4   BILIRUBIN TOTAL mg/dL  --  1.0   ALK PHOS  IU/L  --  64   ALT IU/L  --  18   AST IU/L  --  34   GLUCOSE mg/dL 87 150*     Results from last 7 days   Lab Units 03/23/22  1039   WBC K/uL 5.99   HEMOGLOBIN g/dL 16.6*   HEMATOCRIT % 48.7*   PLATELETS K/uL 179         Results from last 7 days   Lab Units 03/23/22  1039   INR  1.0   PTT sec 27         Imaging  X-RAY KNEE LEFT 4+ VIEWS    Result Date: 3/24/2022  IMPRESSION: No acute osseous abnormality on plain films. Please see above.     CT HEAD WITHOUT IV CONTRAST    Result Date: 3/23/2022  IMPRESSION: No evidence of acute intracranial hemorrhage, mass effect or large acute territorial infarction by CT criteria. Chronic ischemic and microangiopathic changes as discussed below. If there is continued concern consider MRI. COMMENT: Comparison: None TECHNIQUE: CT of the brain was performed without intravenous contrast. CT DOSE:  One or more dose reduction techniques (e.g. automated exposure control, adjustment of the mA and/or kV according to patient size, use of iterative reconstruction technique) utilized for this examination. FINDINGS: Ventricles, sulci and basal cisterns: Normal in size and configuration. Parenchyma: No intracranial hemorrhage, cerebral edema or mass effect. No evidence of acute large territorial infarct. Extensive multifocal white matter disease nonspecific probably microangiopathic. Tiny old right lateral cerebellar lacunar infarcts. Extra-axial spaces: No extra-axial fluid collection. Imaged paranasal sinuses and mastoids: Clear. Calvarium: Within normal limits. Extra calvarial structures:Lens replacements bilaterally. Miscellaneous: Atherosclerotic changes at skull base.    MRI BRAIN WITHOUT CONTRAST    Result Date: 3/24/2022  IMPRESSION: Acute ischemia as above. Likely embolic. Discussed with YFN Dumont, at 1:13 pm on 3/24/2022.    X-RAY CHEST 1 VIEW    Result Date: 3/23/2022  IMPRESSION: Possible patchy pleural parenchymal disease at the left lung base as discussed below.  COMMENT: Comparison: None. AP upright portable view of the chest is performed without the benefit of prior studies for comparison. Possible mild left basilar opacity which could represent atelectasis, artifact from overlying structures or possibly pneumonia. There may be a small left pleural effusion. If there is further concern dedicated PA and lateral views of the chest be considered. Minimal right basilar volume loss. Mild apical pleural parenchymal scarring. Calcified granuloma right midlung. Otherwise, lungs appear clear within the confines the study. No overt pulmonary edema. Cardiac silhouette appears enlarged. Otherwise the stomach contours grossly unremarkable. No upper abdominal findings of concern.       ECG/Telemetry  I have independently reviewed the telemetry. No events for the last 24 hours.    ASSESSMENT AND PLAN      * Transient left leg weakness  Assessment & Plan  Patient reporting to the emergency room when she woke up at 3:00 in the morning when her left leg felt like word, had difficulty walking though she was walking with a walker, went back to bed and slept and at 7 AM she felt improved symptoms but felt very weird and still weak.  On arrival according to the ER provider NIH score was 0  CAT scan of the head unremarkable  Hx of left knee replacement and multiple issues with left leg    - Neuro consult appreciated  - MRI pending  - Received loading dose of aspirin, continue low-dose daily pending MRI  - Atorvastatin with LDL at 116  - Xray left knee with no acute issues  - PT/OT eval    Osteoporosis  Assessment & Plan  - Patient takes Boniva every month    Hypokalemia  Assessment & Plan  - Replete potassium as needed    Hypothyroidism  Assessment & Plan  - Continue levothyroxine from home    Atopic keratoconjunctivitis  Assessment & Plan  - Continue eyedrops from home  - Continue prophylactic valacyclovir from home         VTE Assessment: [place]    VTE Prophylaxis Plan: Current  anticoagulants:    •None      Code Status: Full Code  Estimated Discharge Date:  3/25/2022     Disposition Planning: MRI pending, PT/OT     YFN Winn  3/24/2022

## 2022-03-24 NOTE — PATIENT CARE CONFERENCE
Care Progression Rounds Note  Date: 3/24/2022  Time: 10:52 AM     Patient Name: Catia Ward     Medical Record Number: [id number]   YOB: 1932  Sex: Female      Room/Bed: 2205D    Admitting Diagnosis: Transient left leg weakness [R29.898]  Weakness of left lower extremity [R29.898]   Admit Date/Time: 3/23/2022 10:07 AM    Primary Diagnosis: Transient left leg weakness  Principal Problem: Transient left leg weakness    GMLOS: pending  Anticipated Discharge Date: 3/24/2022    AM-PAC:  Mobility Score:      Discharge Planning:  Anticipated Discharge Disposition: home without assistance or services, home with home health    Barriers to Discharge:  Medical issues not resolved, Test pending, Consultant recommendations pending    Comments:  NIH 0; knee xrays ordered; PT/OT eval    Participants:  , nursing, social work/services

## 2022-03-24 NOTE — PLAN OF CARE
Problem: Adult Inpatient Plan of Care  Goal: Readiness for Transition of Care  Intervention: Mutually Develop Transition Plan  Flowsheets (Taken 3/24/2022 1240)  Anticipated Discharge Disposition:   home without assistance or services   home with home health  Equipment Needed After Discharge: none  Discharge Coordination/Progress: Met with patient at bedside to discuss discharge planning; name, , PCP(Dr. Short) & pharmacy confirmed. Role of Care Coordination explained. Very [place]. Initial assessment completed.      Patient lives alone in an [place] apt at [facility] w/ elevator access. Uses a rollator very infrequently.   PLOF: independent with ADL's  Drives: no  Home Care Services: no recent services  Pharmacy:[facility]  : no   Assistive Device/Animal Currently Used at Home:   bath bench   grab bar   rollator  Anticipated Changes Related to Illness: none  Current Discharge Risk:   chronically ill   lives alone  Readmission Within the Last 30 Days: no previous admission in last 30 days  Patient/Family Anticipated Services at Transition: none  Patient/Family Anticipates Transition to: home  Transportation Anticipated: family will provide  Concerns to be Addressed: adjustment to diagnosis/illness

## 2022-03-24 NOTE — ASSESSMENT & PLAN NOTE
Patient reporting to the emergency room when she woke up at 3:00 in the morning when her left leg felt like word, had difficulty walking though she was walking with a walker, went back to bed and slept and at 7 AM she felt improved symptoms but felt very weird and still weak.  On arrival according to the ER provider NIH score was 0  CAT scan of the head unremarkable  Hx of left knee replacement and multiple issues with left leg  Xray left knee with no acute issues    - Neuro consult appreciated  - MRI brain: small acute ischemia in the right centrum semioval extending to the right precentral gyrus subcortical white matter and punctate focus of acute ischemia along the left postcentral gyrus cortex, concerning for an embolic event  - Received loading dose of aspirin, continue low-dose daily  - Atorvastatin with LDL at 116  - PT/OT eval

## 2022-03-24 NOTE — UM PHYSICIAN REVIEW NOTE
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   She has yet to be seen by PT, OT.  Per Orders, TTE, MRA head and neck have just been ordered.  Cardiology has been consulted to evaluate for an embolic source. Pt will require >2 MN stay.    Kush Uribe MD

## 2022-03-24 NOTE — PLAN OF CARE
Problem: Adult Inpatient Plan of Care  Goal: Patient-Specific Goal (Individualized)  Outcome: Progressing  Flowsheets  Taken 3/24/2022 1230 by Aldair Griffin, RN  Individualized Care Needs: Open for xray, NIH, neurochecks  Taken 3/24/2022 0244 by Laurie Cooley, RN  Anxieties, Fears or Concerns: none verbalized  Taken 3/23/2022 1856 by Lidia Woodard RN  Patient-Specific Goals (Include Timeframe): to go home       Plan of Care Review  Plan of Care Reviewed With: patient  Progress: improving  Outcome Summary: Pt aaox4, VSS, CM=SR. No c/o pain. NIH=0. MRI completed. Open for XRAY of knee. Sup BRP w/ home walker.

## 2022-03-24 NOTE — PLAN OF CARE
Plan of Care Review  Plan of Care Reviewed With: patient  Progress: improving  Outcome Summary: Recd pt @1900, aaox4. VSS. CM=NSR. NIH=0. Denies any pain. OOB to BRP standby assist x1 w/walker. Pt able to make needs known. Call bell w/in reach. Bed alarm engaged.

## 2022-03-24 NOTE — PROGRESS NOTES
Neurology Progress Note    Subjective     Interval History: The patient was seen and examined this morning. She reports that her left leg still feels weak especially around her left knee when walking. No back pain or apparent leg pain. The patient denies any headaches, dizziness, speech changes, new visual disturbances, trouble with swallowing, or weakness in other 3 extremities.     Current Facility-Administered Medications   Medication Dose Route Frequency Provider Last Rate Last Admin   • acetaminophen (TYLENOL) tablet 650 mg  650 mg oral q4h PRN Racquel Renteria MD       • artificial tears (dextran-hpm-glyc) (GENTEAL TEARS) 0.1-0.3-0.2 % eye drops 1 drop  1 drop Both Eyes 3x daily PRN Racquel Renteria MD       • aspirin enteric coated tablet 81 mg  81 mg oral Daily Macie Jose CRNP   81 mg at 03/24/22 0856   • atorvastatin (LIPITOR) tablet 40 mg  40 mg oral Daily (6p) Macie Jose CRNP       • glucose chewable tablet 16-32 g of dextrose  16-32 g of dextrose oral PRN Racquel Renteria MD        Or   • dextrose 40 % oral gel 15-30 g of dextrose  15-30 g of dextrose oral PRN Racquel Renteria MD        Or   • glucagon (GLUCAGEN) injection 1 mg  1 mg intramuscular PRN Racquel Renteria MD        Or   • dextrose in water injection 12.5 g  25 mL intravenous PRN Racquel Renteria MD       • fluorometholone (FML) 0.1 % ophthalmic suspension 1 drop  1 drop Left Eye BID Racquel Renteria MD   1 drop at 03/24/22 0854   • levothyroxine (SYNTHROID) tablet 75 mcg  75 mcg oral Daily (6:30a) Racquel Renteria MD   75 mcg at 03/24/22 0614   • LORazepam (ATIVAN) tablet 0.5 mg  0.5 mg oral Once PRN Macie Jose CRNP       • raloxifene (EVISTA) tablet 60 mg  60 mg oral Daily (6a) Racquel Renteria MD   60 mg at 03/24/22 0614   • valACYclovir (VALTREX) tablet 1,000 mg  1,000 mg oral Daily Racquel Renteria MD   1,000 mg at 03/24/22 0853       Objective      Vital signs in last 24 hours:  Temp:  [36.4 °C  (97.5 °F)-36.9 °C (98.5 °F)] 36.4 °C (97.6 °F)  Heart Rate:  [] 62  Resp:  [15-20] 18  BP: (101-159)/(54-82) 118/61    Physical Exam:     General Appearance: Awake. Not in Acute Distress.   Head: Normocephalic, atraumatic.   Eyes: Pupils were about equal and reacted to light.    Neck: Supple, no nuchal rigidity. No apparent bruits.   Respiratory: CTA.   Cardiovascular: Regular rate and rhythm.   Gastrointestinal: Soft, + Bowel sounds.   Extremities: No edema. Old surgical scars in the left thigh and left knee.   Skin: Dry.   Neurologic:  MS: Awake, alert, and oriented x3. Speech was clear. No aphasia. No dysarthria. CN: Pupils were about equal and reacted to light. EOMI. No apparent visual field defects by confrontation testing. No nystagmus or diplopia. Facial PP sensation was grossly normal. No clear facial weakness. Tongue protruded midline. PP sensation was grossly normal bilaterally. Motor: No drift in B/L UEs. No weakness in B/L UEs except mildly decreased ROM in her left shoulder (Baseline per the patient, history of left shoulder fracture). No drift/No weakness in RLE. LLE with slight weakness as compared to RLE. No obviously increased muscle tone. DTRs: About 2+ in UEs and at the right knee. Left knee reflex was unable to be elicited, status post multiple left knee surgeries. No ankle clonus.Toes were downgoing B/L. Coordination: Finger to nose was intact bilaterally.   Gait: Not tested at this time.    Behavior/Emotional: Appropriate, cooperative.      LABS:  Results for orders placed or performed during the hospital encounter of 03/23/22   SARS-CoV-2 (COVID-19), PCR Nasopharynx    Specimen: Nasopharynx; Nasopharyngeal Swab   Result Value Ref Range    SARS-CoV-2 (COVID-19) Negative Negative   CBC and differential   Result Value Ref Range    WBC 5.99 3.80 - 10.50 K/uL    RBC 4.90 3.93 - 5.22 M/uL    Hemoglobin 16.6 (H) 11.8 - 15.7 g/dL    Hematocrit 48.7 (H) 35.0 - 45.0 %    MCV 99.4 (H) 83.0 - 98.0  fL    MCH 33.9 (H) 28.0 - 33.2 pg    MCHC 34.1 32.2 - 35.5 g/dL    RDW 12.2 11.7 - 14.4 %    Platelets 179 150 - 369 K/uL    MPV 10.9 9.4 - 12.3 fL    Differential Type Auto     nRBC 0.0 <=0.0 %    Immature Granulocytes 0.3 %    Neutrophils 59.7 %    Lymphocytes 29.9 %    Monocytes 6.7 %    Eosinophils 2.2 %    Basophils 1.2 %    Immature Granulocytes, Absolute 0.02 0.00 - 0.08 K/uL    Neutrophils, Absolute 3.58 1.70 - 7.00 K/uL    Lymphocytes, Absolute 1.79 1.20 - 3.50 K/uL    Monocytes, Absolute 0.40 0.28 - 0.80 K/uL    Eosinophils, Absolute 0.13 0.04 - 0.36 K/uL    Basophils, Absolute 0.07 0.01 - 0.10 K/uL   Comprehensive metabolic panel   Result Value Ref Range    Sodium 141 136 - 144 mEQ/L    Potassium 3.7 3.6 - 5.1 mEQ/L    Chloride 104 98 - 109 mEQ/L    CO2 25 22 - 32 mEQ/L    BUN 17 8 - 20 mg/dL    Creatinine 0.8 0.6 - 1.1 mg/dL    Glucose 150 (H) 70 - 99 mg/dL    Calcium 9.7 8.9 - 10.3 mg/dL    AST (SGOT) 34 15 - 41 IU/L    ALT (SGPT) 18 11 - 54 IU/L    Alkaline Phosphatase 64 35 - 126 IU/L    Total Protein 7.0 6.0 - 8.2 g/dL    Albumin 4.4 3.4 - 5.0 g/dL    Bilirubin, Total 1.0 0.3 - 1.2 mg/dL    eGFR >60.0 >=60.0 mL/min/1.73m*2    Anion Gap 12 3 - 15 mEQ/L   HS Troponin I (with 2 hour reflex)   Result Value Ref Range    High Sens Troponin I 4.4 <15 pg/mL   Protime-INR   Result Value Ref Range    PT 12.8 12.2 - 14.5 sec    INR 1.0     APTT   Result Value Ref Range    PTT 27 23 - 35 sec   UA Reflex to Culture (Macroscopic)    Specimen: Urine, Clean Catch   Result Value Ref Range    Color, Urine Colorless Yellow, Colorless    Clarity, Urine Clear Clear    Specific Gravity, Urine 1.007 1.005 - 1.030    pH, Urine 6.5 4.5 - 8.0    Leukocyte Esterase Negative Negative    Nitrite, Urine Negative Negative    Protein, Urine Negative Negative    Glucose, Urine Negative Negative mg/dL    Ketones, Urine Negative Negative mg/dL    Urobilinogen, Urine 0.2 <2.0 EU/dL EU/dL    Bilirubin, Urine Negative Negative mg/dL     Blood, Urine Negative Negative   HIGH SENSITIVE TROPONIN I (NO REFLEX)   Result Value Ref Range    High Sens Troponin I 4.4 <15 pg/mL   Basic metabolic panel   Result Value Ref Range    Sodium 141 136 - 144 mEQ/L    Potassium 3.9 3.6 - 5.1 mEQ/L    Chloride 105 98 - 109 mEQ/L    CO2 24 22 - 32 mEQ/L    BUN 17 8 - 20 mg/dL    Creatinine 0.8 0.6 - 1.1 mg/dL    Glucose 87 70 - 99 mg/dL    Calcium 9.1 8.9 - 10.3 mg/dL    eGFR >60.0 >=60.0 mL/min/1.73m*2    Anion Gap 12 3 - 15 mEQ/L   Lipid panel   Result Value Ref Range    Triglycerides 57 30 - 149 mg/dL    Cholesterol 186 <=200 mg/dL    HDL 59 >=55 mg/dL    LDL Calculated 116 (H) <=100 mg/dL    Non-HDL, Calculated 127 mg/dL    RISK 3.2 <=5.0     I reviewed the lab results.    ECG/TELEMETRY/ECHO:  ECG 3/23/2022:  Sinus rhythm with occasional Premature ventricular complexes   Minimal voltage criteria for LVH, may be normal variant if age is < 37 yrs   Cannot rule out Anterior infarct , age undetermined   No previous ECGs available    Prior Transthoracic echo (TTE) complete 8/18/2021:  Interpretation Summary    · Normal-sized LV. Normal LV systolic function. Estimated EF 65%. No regional wall motion abnormalities. Grade I LV diastolic dysfunction. LV diastolic inflow pattern consistent with impaired relaxation. Normal left atrial pressure.  · Normal-sized RV. Normal RV systolic function.  · Mildly dilated LA.  · Normal-sized RA.  · Tricuspid aortic valve. Mild diffuse aortic valve calcification present. Sclerotic aortic valve leaflets. Mild aortic valve regurgitation. No aortic valve stenosis.  · Normal pulmonic valve structure. No pulmonic valve regurgitation. No pulmonic valve stenosis.  · Normal leaflet structure of the mitral valve.  · Trace mitral valve regurgitation.  · Tricuspid valve structure is normal. Trace tricuspid valve regurgitation.  · Estimated RVSP = 23 mmHg.  · Normal pericardial structure. No evidence of pericardial effusion. No cardiac  tamponade.  · Aortic root normal. Sinuses of Valsalva normal-sized. Ascending aorta normal-sized.      IMAGING:  CT HEAD WITHOUT IV CONTRAST    Result Date: 3/23/2022  IMPRESSION: No evidence of acute intracranial hemorrhage, mass effect or large acute territorial infarction by CT criteria. Chronic ischemic and microangiopathic changes as discussed below. If there is continued concern consider MRI. COMMENT: Comparison: None TECHNIQUE: CT of the brain was performed without intravenous contrast. CT DOSE:  One or more dose reduction techniques (e.g. automated exposure control, adjustment of the mA and/or kV according to patient size, use of iterative reconstruction technique) utilized for this examination. FINDINGS: Ventricles, sulci and basal cisterns: Normal in size and configuration. Parenchyma: No intracranial hemorrhage, cerebral edema or mass effect. No evidence of acute large territorial infarct. Extensive multifocal white matter disease nonspecific probably microangiopathic. Tiny old right lateral cerebellar lacunar infarcts. Extra-axial spaces: No extra-axial fluid collection. Imaged paranasal sinuses and mastoids: Clear. Calvarium: Within normal limits. Extra calvarial structures:Lens replacements bilaterally. Miscellaneous: Atherosclerotic changes at skull base.    X-RAY CHEST 1 VIEW    Result Date: 3/23/2022  IMPRESSION: Possible patchy pleural parenchymal disease at the left lung base as discussed below. COMMENT: Comparison: None. AP upright portable view of the chest is performed without the benefit of prior studies for comparison. Possible mild left basilar opacity which could represent atelectasis, artifact from overlying structures or possibly pneumonia. There may be a small left pleural effusion. If there is further concern dedicated PA and lateral views of the chest be considered. Minimal right basilar volume loss. Mild apical pleural parenchymal scarring. Calcified granuloma right midlung.  Otherwise, lungs appear clear within the confines the study. No overt pulmonary edema. Cardiac silhouette appears enlarged. Otherwise the stomach contours grossly unremarkable. No upper abdominal findings of concern.     I personally reviewed the patient's neuroimaging study.    Assessment and Plan:  Assessment   89 y.o. female with a past medical history of hypothyroidism, left femur fracture status post surgery, multiple left knee surgeries, left shoulder fracture, and left elbow surgery, presented on 03/23/2022 with left leg weakness. No other associated neurological symptoms reported. Besides slight left lower extremity weakness, neurological exam showed no other definite findings. Her history of left femur fracture status post surgery and multiple left knee surgeries should be taken into account for this finding. Her left lower extremity weakness may be related to the above left lower extremity orthopedic conditions/surgeries, although an acute stroke cannot be excluded. The duration of her left leg weakness is not consistent with a diagnosis of TIA.       Head CT showed no acute intracranial abnormality.    Plan   -Agree with MRI brain to rule out acute stroke. Further stroke work-up pending MRI brain findings. If MRI brain is unrevealing, would consider imaging her left knee.  -Cardiac monitoring while in the hospital.   -The patient was given  mg x1 in the ED. OK for ASA 81 mg daily for now pending MRI brain findings.  -The patient was also started on statin therapy.   -No reported history of HTN. Monitor blood pressure.   -PT evaluation.   -The rest of medical and supportive care per the primary team.  -Care plan was discussed with the patient and primary team YFN Morton this morning.    Addendum 3PM:  -MRI brain showed small acute ischemia in the right centrum semiovale extending to the right precentral gyrus subcortical white matter and punctate focus of acute ischemia along the  left postcentral gyrus cortex, concerning for an embolic event.    -Check MRA of head and neck.  -Cardiac monitoring. Check TTE. If TTE is unremarkable and tele remains unrevealing, consult cardiology for further cardioembolic source workup such as implantable loop recorder.  -Continue ASA 81 mg daily.  -Continue statin therapy.  -I discussed the above with YFN Dumont this afternoon.    Thank you for allowing me to participate in the care of this patient. If you have any further questions, please do not hesitate to contact me.    Face to face patient counseling and coordinating care > 50% encounter time.  Total encounter time: 40 minutes.    Josselyn Mederos MD   3/24/2022

## 2022-03-25 VITALS
WEIGHT: 135 LBS | SYSTOLIC BLOOD PRESSURE: 144 MMHG | HEART RATE: 95 BPM | OXYGEN SATURATION: 97 % | DIASTOLIC BLOOD PRESSURE: 57 MMHG | TEMPERATURE: 97.9 F | RESPIRATION RATE: 16 BRPM | HEIGHT: 62 IN | BODY MASS INDEX: 24.84 KG/M2

## 2022-03-25 PROBLEM — E87.6 HYPOKALEMIA: Status: RESOLVED | Noted: 2022-03-23 | Resolved: 2022-03-25

## 2022-03-25 PROBLEM — I34.0 NONRHEUMATIC MITRAL VALVE REGURGITATION: Status: ACTIVE | Noted: 2022-03-25

## 2022-03-25 LAB
ANION GAP SERPL CALC-SCNC: 10 MEQ/L (ref 3–15)
BUN SERPL-MCNC: 18 MG/DL (ref 8–20)
CALCIUM SERPL-MCNC: 9 MG/DL (ref 8.9–10.3)
CHLORIDE SERPL-SCNC: 105 MEQ/L (ref 98–109)
CO2 SERPL-SCNC: 24 MEQ/L (ref 22–32)
CREAT SERPL-MCNC: 0.8 MG/DL (ref 0.6–1.1)
ERYTHROCYTE [DISTWIDTH] IN BLOOD BY AUTOMATED COUNT: 12.2 % (ref 11.7–14.4)
GFR SERPL CREATININE-BSD FRML MDRD: >60 ML/MIN/1.73M*2
GLUCOSE SERPL-MCNC: 100 MG/DL (ref 70–99)
HCT VFR BLDCO AUTO: 44.3 % (ref 35–45)
HGB BLD-MCNC: 14.8 G/DL (ref 11.8–15.7)
MCH RBC QN AUTO: 33.3 PG (ref 28–33.2)
MCHC RBC AUTO-ENTMCNC: 33.4 G/DL (ref 32.2–35.5)
MCV RBC AUTO: 99.8 FL (ref 83–98)
PDW BLD AUTO: 11 FL (ref 9.4–12.3)
PLATELET # BLD AUTO: 179 K/UL (ref 150–369)
POTASSIUM SERPL-SCNC: 3.9 MEQ/L (ref 3.6–5.1)
RBC # BLD AUTO: 4.44 M/UL (ref 3.93–5.22)
SODIUM SERPL-SCNC: 139 MEQ/L (ref 136–144)
WBC # BLD AUTO: 7.85 K/UL (ref 3.8–10.5)

## 2022-03-25 PROCEDURE — 33285 INSJ SUBQ CAR RHYTHM MNTR: CPT | Performed by: INTERNAL MEDICINE

## 2022-03-25 PROCEDURE — 63600000 HC DRUGS/DETAIL CODE: Performed by: NURSE PRACTITIONER

## 2022-03-25 PROCEDURE — 97166 OT EVAL MOD COMPLEX 45 MIN: CPT | Mod: GO

## 2022-03-25 PROCEDURE — 85027 COMPLETE CBC AUTOMATED: CPT | Performed by: NURSE PRACTITIONER

## 2022-03-25 PROCEDURE — 99239 HOSP IP/OBS DSCHRG MGMT >30: CPT | Performed by: INTERNAL MEDICINE

## 2022-03-25 PROCEDURE — 99223 1ST HOSP IP/OBS HIGH 75: CPT | Mod: 25 | Performed by: INTERNAL MEDICINE

## 2022-03-25 PROCEDURE — 0JH632Z INSERTION OF MONITORING DEVICE INTO CHEST SUBCUTANEOUS TISSUE AND FASCIA, PERCUTANEOUS APPROACH: ICD-10-PCS | Performed by: INTERNAL MEDICINE

## 2022-03-25 PROCEDURE — C1764 EVENT RECORDER, CARDIAC: HCPCS | Performed by: INTERNAL MEDICINE

## 2022-03-25 PROCEDURE — 97162 PT EVAL MOD COMPLEX 30 MIN: CPT | Mod: GP | Performed by: PHYSICAL THERAPIST

## 2022-03-25 PROCEDURE — 97116 GAIT TRAINING THERAPY: CPT | Mod: GP | Performed by: PHYSICAL THERAPIST

## 2022-03-25 PROCEDURE — 27200000 HC STERILE SUPPLY: Performed by: INTERNAL MEDICINE

## 2022-03-25 PROCEDURE — 80048 BASIC METABOLIC PNL TOTAL CA: CPT | Performed by: NURSE PRACTITIONER

## 2022-03-25 PROCEDURE — 63700000 HC SELF-ADMINISTRABLE DRUG: Performed by: NURSE PRACTITIONER

## 2022-03-25 PROCEDURE — 99233 SBSQ HOSP IP/OBS HIGH 50: CPT | Performed by: PSYCHIATRY & NEUROLOGY

## 2022-03-25 PROCEDURE — 25000000 HC PHARMACY GENERAL: Performed by: INTERNAL MEDICINE

## 2022-03-25 PROCEDURE — 63700000 HC SELF-ADMINISTRABLE DRUG: Performed by: INTERNAL MEDICINE

## 2022-03-25 PROCEDURE — 36415 COLL VENOUS BLD VENIPUNCTURE: CPT | Performed by: NURSE PRACTITIONER

## 2022-03-25 DEVICE — MON LNQ11 REVEAL LINQ USA
Type: IMPLANTABLE DEVICE | Site: CHEST  WALL | Status: FUNCTIONAL
Brand: REVEAL LINQ™

## 2022-03-25 RX ORDER — ARTIFICIAL TEARS 1; 2; 3 MG/ML; MG/ML; MG/ML
1 SOLUTION/ DROPS OPHTHALMIC 3 TIMES DAILY PRN
Start: 2022-03-25 | End: 2023-07-20 | Stop reason: ALTCHOICE

## 2022-03-25 RX ORDER — ASPIRIN 81 MG/1
81 TABLET ORAL DAILY
Qty: 30 TABLET | Refills: 0 | Status: SHIPPED | OUTPATIENT
Start: 2022-03-26 | End: 2025-03-13

## 2022-03-25 RX ORDER — ATORVASTATIN CALCIUM 40 MG/1
40 TABLET, FILM COATED ORAL
Qty: 30 TABLET | Refills: 0 | Status: SHIPPED | OUTPATIENT
Start: 2022-03-25 | End: 2022-04-08

## 2022-03-25 RX ORDER — LIDOCAINE HYDROCHLORIDE AND EPINEPHRINE 10; 10 UG/ML; MG/ML
INJECTION, SOLUTION INFILTRATION; PERINEURAL
Status: DISCONTINUED | OUTPATIENT
Start: 2022-03-25 | End: 2022-03-25 | Stop reason: HOSPADM

## 2022-03-25 RX ADMIN — ASPIRIN 81 MG: 81 TABLET, COATED ORAL at 08:59

## 2022-03-25 RX ADMIN — LEVOTHYROXINE SODIUM 75 MCG: 75 TABLET ORAL at 06:00

## 2022-03-25 RX ADMIN — RALOXIFENE 60 MG: 60 TABLET ORAL at 06:00

## 2022-03-25 RX ADMIN — HEPARIN SODIUM 5000 UNITS: 5000 INJECTION, SOLUTION INTRAVENOUS; SUBCUTANEOUS at 06:00

## 2022-03-25 RX ADMIN — VALACYCLOVIR HYDROCHLORIDE 1000 MG: 1 TABLET, FILM COATED ORAL at 09:00

## 2022-03-25 RX ADMIN — FLUOROMETHOLONE 1 DROP: 1 SOLUTION/ DROPS OPHTHALMIC at 09:00

## 2022-03-25 ASSESSMENT — COGNITIVE AND FUNCTIONAL STATUS - GENERAL
WALKING IN HOSPITAL ROOM: 4 - NONE
CLIMB 3 TO 5 STEPS WITH RAILING: 3 - A LITTLE
HELP NEEDED FOR PERSONAL GROOMING: 4 - NONE
AFFECT: WFL
MOVING TO AND FROM BED TO CHAIR: 4 - NONE
EATING MEALS: 4 - NONE
AFFECT: WFL
TOILETING: 4 - NONE
DRESSING REGULAR UPPER BODY CLOTHING: 4 - NONE
STANDING UP FROM CHAIR USING ARMS: 4 - NONE
DRESSING REGULAR LOWER BODY CLOTHING: 4 - NONE
HELP NEEDED FOR BATHING: 4 - NONE

## 2022-03-25 ASSESSMENT — ENCOUNTER SYMPTOMS
DIAPHORESIS: 0
COUGH: 0
DYSPNEA ON EXERTION: 0
FEVER: 0
FALLS: 0
SHORTNESS OF BREATH: 0

## 2022-03-25 NOTE — DISCHARGE INSTRUCTIONS
You were evaluated for left-side weakness and found to have had an embolic stroke.    You had a loop recorder placed to evaluate for irregular heart rhythm as cause of your stroke.    Follow with Cardiology as outpatient: Dr Barry April 8, 2022 10am at [place] office. Wound check at that time as well.     Follow with Dr Erwin, Neurology, in 2-3 weeks. Call for an appointment, contact info above for your convenience.      -Follow up with your primary care physician within one week.    -If you have chest pain, shortness of breath, fever, uncontrolled vomiting or diarrhea, severe abdominal pain, light headedness, passing out, vision change, numbness/tingling/weakness, or any additional concerning symptoms as discussed, return to the Emergency Department.     A discharge summary will be sent to your primary care provider to ensure continuity of care. Please bring your After Visit Summary with you to your next appointment so your provider can review this document.

## 2022-03-25 NOTE — PATIENT CARE CONFERENCE
Care Progression Rounds Note  Date: 3/25/2022  Time: 10:48 AM     Patient Name: Catia Ward     Medical Record Number: [id number]   YOB: 1932  Sex: Female      Room/Bed: 2205D    Admitting Diagnosis: Transient left leg weakness [R29.898]  Weakness of left lower extremity [R29.898]  Acute CVA (cerebrovascular accident) (CMS/HCC) [I63.9]   Admit Date/Time: 3/23/2022 10:07 AM    Primary Diagnosis: Acute CVA (cerebrovascular accident) (CMS/HCC)  Principal Problem: Acute CVA (cerebrovascular accident) (CMS/HCC)    GMLOS: pending  Anticipated Discharge Date: 3/25/2022    AM-PAC:  Mobility Score:      Discharge Planning:  Current Living Arrangements: independent living facility  Concerns to be Addressed: adjustment to diagnosis/illness  Anticipated Discharge Disposition: home with assistance, home with home health    Barriers to Discharge:  Consult pending, Medical issues not resolved, Consultant recommendations pending    Comments:  loop recorder insertion today    Participants:  , nursing

## 2022-03-25 NOTE — PLAN OF CARE
Plan of Care Review  Plan of Care Reviewed With: patient  Progress: improving  Outcome Summary: Recd pt @1900, aaox4. VSS. CM=NSR. NIH=0. Denies any pain. OOB to BRP w/walker, supervision. Pt able to make needs known. Call bell w/in reach. Bed alarm engaged. Will continue to monitor

## 2022-03-25 NOTE — DISCHARGE SUMMARY
Hospital Medicine Service -  Inpatient Discharge Summary        BRIEF OVERVIEW   Admitting Provider: Ester Ventura DO  Attending Provider: Ester Ventura DO Attending phys phone: [phone]  Advanced Practitioner: YFN Dumont [facility] [place] [facility]    PCP: Porsha Short [phone]    Admission Date: 3/23/2022  Discharge Date: 3/25/2022     DISCHARGE DIAGNOSES      Primary Discharge Diagnosis  Acute CVA (cerebrovascular accident) (CMS/[facility])    Secondary Discharge Diagnoses  Active Hospital Problems    Diagnosis Date Noted   • Acute CVA (cerebrovascular accident) (CMS/HCC) 03/24/2022     Priority: High   • Nonrheumatic mitral valve regurgitation 03/25/2022   • Hypothyroidism 01/31/2020      Resolved Hospital Problems   No resolved problems to display.       SUMMARY OF HOSPITALIZATION      Presenting Problem/History of Present Illness  This is a 89 y.o. year-old female admitted on 3/23/2022 with Transient left leg weakness [R29.898]  Weakness of left lower extremity [R29.898]  Acute CVA (cerebrovascular accident) (CMS/[facility]) [I63.9].    From Admission H/P:  89 y.o. female with a past medical history as mentioned below, lives in independent apartment at [facility], woke up at 3 AM to go to the bathroom, normally walks independently however during the nights he uses walker to go to the bathroom, felt her left leg very weak and described as would like, went back to bed and slept and woke up again at 7 AM, her left leg feeling much better from before however still feeling weird and weak, and called PCP office who referred her to come to the emergency room.     On my encounter patient reports that she still cannot lift her left lower extremity all the way up against gravity or assistant, denies any paresthesias, numbness, denies any weakness or numbness of the extremities, denies any headache, blurry vision, slurred speech denies any chest pain, lightheadedness, palpitation, fevers, cough cold congestion, shortness  of breath, abdominal pain, nausea, vomiting, constipation, diarrhea, dysuria, on 12 point review of system     Hospital Course  Pt was admitted to stroke/tele floor. Head CT in the ED showed no acute intracranial abnormality. Subsequently, the patient had MRI brain on 03/24/2022, which showed small acute ischemia in the right centrum semioval extending to the right precentral gyrus subcortical white matter and punctate focus of acute ischemia along the left postcentral gyrus cortex, concerning for an embolic event.   MRA of head and neck studies were unremarkable.   TTE showed no obvious cardiac source of embolism.   Telemetry-no atrial fibrillation noted to date.  Cardiology was consulted and a loop recorder was placed to monitor for any arhythmia as an outpatient.   Pt will start on daily ASA 81 mg.  Follow with Cardiology and Neurology as outpatient.   Discharge instructions reviewed with patient and daughter, Kim, prior to discharge.    See Problem List for treatment plan per diagnosis:    Problem List on Day of Discharge  * Acute CVA (cerebrovascular accident) (CMS/[facility])  Assessment & Plan  MRI brain: small acute ischemia in the right centrum semioval extending to the right precentral gyrus subcortical white matter and punctate focus of acute ischemia along the left postcentral gyrus cortex, concerning for an embolic event    -Continue ASA 81 mg daily  -Continue statin therapy  -Cardiology was consulted for further cardioembolic source workup  -S/p loop recorder placement 3/25/22  -Follow with Neurology as outpatient  -Follow with Cardiology as outpatient: Dr Barry April 8, 2022 10 at [place] office. Wound check at that time as well.     Transient left leg weakness  Assessment & Plan  Patient reporting to the emergency room when she woke up at 3:00 in the morning when her left leg felt like word, had difficulty walking though she was walking with a walker, went back to bed and slept and at 7 AM she felt improved  symptoms but felt very weird and still weak.  On arrival according to the ER provider NIH score was 0  CAT scan of the head unremarkable  Hx of left knee replacement and multiple issues with left leg  Xray left knee with no acute issues    - Neuro consult appreciated  - MRI brain: small acute ischemia in the right centrum semioval extending to the right precentral gyrus subcortical white matter and punctate focus of acute ischemia along the left postcentral gyrus cortex, concerning for an embolic event  - Received loading dose of aspirin, continue low-dose daily  - Atorvastatin with LDL at 116  - PT/OT eval    Nonrheumatic mitral valve regurgitation  Overview  - Mild MR on ECHO March 23, 2022    Osteoporosis  Assessment & Plan  - Patient takes Boniva every month    Hypothyroidism  Assessment & Plan  - Continue levothyroxine from home    Atopic keratoconjunctivitis  Assessment & Plan  - Continue eyedrops from home  - Continue prophylactic valacyclovir from home        Exam on Day of Discharge  Physical Exam  Pt was seen and examined on day of discharge, stable condition, no distress.     Consults During Admission  IP CONSULT TO NEUROLOGY  IP CONSULT TO CARDIOLOGY    DISCHARGE MEDICATIONS        Medication List      START taking these medications    artificial tears (dextran-hpm-glyc) 0.1-0.3-0.2 % drops eye drops  Commonly known as: GENTEAL TEARS  Administer 1 drop into both eyes 3 (three) times a day as needed (dry eyes).  Dose: 1 drop  Replaces: polyvinyl alcohol 1.4 % ophthalmic solution     aspirin 81 mg enteric coated tablet  Start taking on: March 26, 2022  Take 1 tablet (81 mg total) by mouth daily.  Dose: 81 mg     atorvastatin 40 mg tablet  Commonly known as: LIPITOR  Take 1 tablet (40 mg total) by mouth daily.  Dose: 40 mg        CONTINUE taking these medications    bacitracin-polymyxin B ophthalmic ointment  Commonly known as: POLYSPORIN  Apply 1 application to both eyes nightly.  Dose: 1 application      betamethasone valerate 0.1 % cream  Commonly known as: VALISONE  Apply 1 application topically as needed for irritation (eczema).  Dose: 1 application     calcium carbonate-vitamin D3 600 mg-10 mcg (400 unit) capsule  Take 1 tablet by mouth 2 (two) times a day.  Dose: 1 tablet     fluorometholone 0.1 % ophthalmic suspension  Commonly known as: FML  Administer 1 drop into the left eye 2 (two) times a day.  Dose: 1 drop     ibandronate 150 mg tablet  Commonly known as: BONIVA  TAKE 1 TABLET EVERY 30 DAYS. TAKE IN THE MORNING WITH GLASS OF WATER PRIOR TO FOOD, DO NOT LIE DOWN FOR 30 MINUTES.     levothyroxine 75 mcg tablet  Commonly known as: SYNTHROID  Take 75 mcg by mouth daily.  Dose: 75 mcg     peg 400-propylene glycol 0.4-0.3 % drops  Commonly known as: SYSTANE ULTRA  Administer 1 drop into both eyes 3 (three) times a day as needed.  Dose: 1 drop     raloxifene 60 mg tablet  Commonly known as: EVISTA  Take 60 mg by mouth daily.  Dose: 60 mg     valACYclovir 1 gram tablet  Commonly known as: VALTREX  Take 1,000 mg by mouth daily. To prevent herpes infection in her eyes   Because she is on fml eye drops  Dose: 1,000 mg        STOP taking these medications    polyvinyl alcohol 1.4 % ophthalmic solution  Commonly known as: LIQUIFILM TEARS  Replaced by: artificial tears (dextran-hpm-glyc) 0.1-0.3-0.2 % drops eye drops                  Instructions for after discharge     Call provider for:      Please call your PCP for symptoms including, but not limited to: fevers (temps > 100.4F or 38.1C), chills, intractable nausea or vomiting, diarrhea, rash, shortness of breath, bleeding, pain, or if you have worsening of symptoms that brought you to the hospital.    For EMERGENCY and VERY SERIOUS health-related issues, you may need to go directly to the Emergency Room or call 911.    Follow-up with Provider:      Follow with Cardiology as outpatient: Dr Barry April 8, 2022 10 at [facility]. Wound check at that time as well.     Xavier Barry MD   [phone]    [address]  [place] [id number]       Follow-up with primary physician (PCP)      Follow with your PCP, Porsha Bonilla DO,in 3-5 days.    Follow-up with provider      Follow with Dr Erwin, Neurology, in 2-3 weeks. Call for an appointment.    Enid Erwin MD   [phone]    [address]  [place] [id number]                PROCEDURES / LABS / IMAGING      Operative Procedures      Other Procedures      Pertinent Labs  Results from last 7 days   Lab Units 03/25/22  0443 03/23/22  1039   WBC K/uL 7.85 5.99   HEMOGLOBIN g/dL 14.8 16.6*   HEMATOCRIT % 44.3 48.7*   PLATELETS K/uL 179 179     Results from last 7 days   Lab Units 03/25/22  0443 03/24/22  0420 03/23/22  1039   SODIUM mEQ/L 139 141 141   POTASSIUM mEQ/L 3.9 3.9 3.7   CHLORIDE mEQ/L 105 105 104   CO2 mEQ/L 24 24 25   BUN mg/dL 18 17 17   CREATININE mg/dL 0.8 0.8 0.8   CALCIUM mg/dL 9.0 9.1 9.7   ALBUMIN g/dL  --   --  4.4   BILIRUBIN TOTAL mg/dL  --   --  1.0   ALK PHOS IU/L  --   --  64   ALT IU/L  --   --  18   AST IU/L  --   --  34   GLUCOSE mg/dL 100* 87 150*           Pertinent Imaging  X-RAY KNEE LEFT 4+ VIEWS    Result Date: 3/24/2022  IMPRESSION: No acute osseous abnormality on plain films. Please see above.     CT HEAD WITHOUT IV CONTRAST    Result Date: 3/23/2022  IMPRESSION: No evidence of acute intracranial hemorrhage, mass effect or large acute territorial infarction by CT criteria. Chronic ischemic and microangiopathic changes as discussed below. If there is continued concern consider MRI. COMMENT: Comparison: None TECHNIQUE: CT of the brain was performed without intravenous contrast. CT DOSE:  One or more dose reduction techniques (e.g. automated exposure control, adjustment of the mA and/or kV according to patient size, use of iterative reconstruction technique) utilized for this examination. FINDINGS: Ventricles, sulci and basal cisterns: Normal  in size and configuration. Parenchyma: No intracranial hemorrhage, cerebral edema or mass effect. No evidence of acute large territorial infarct. Extensive multifocal white matter disease nonspecific probably microangiopathic. Tiny old right lateral cerebellar lacunar infarcts. Extra-axial spaces: No extra-axial fluid collection. Imaged paranasal sinuses and mastoids: Clear. Calvarium: Within normal limits. Extra calvarial structures:Lens replacements bilaterally. Miscellaneous: Atherosclerotic changes at skull base.    MRI ANGIOGRAM HEAD WITHOUT CONTRAST    Result Date: 3/25/2022  IMPRESSION: No evidence of hemodynamically significant stenosis or occlusion.    MRI ANGIOGRAM NECK WITHOUT CONTRAST    Result Date: 3/25/2022  IMPRESSION: Please see associated report.     MRI BRAIN WITHOUT CONTRAST    Result Date: 3/24/2022  IMPRESSION: Acute ischemia as above. Likely embolic. Discussed with YFN Dumont, at 1:13 pm on 3/24/2022.    X-RAY CHEST 1 VIEW    Result Date: 3/23/2022  IMPRESSION: Possible patchy pleural parenchymal disease at the left lung base as discussed below. COMMENT: Comparison: None. AP upright portable view of the chest is performed without the benefit of prior studies for comparison. Possible mild left basilar opacity which could represent atelectasis, artifact from overlying structures or possibly pneumonia. There may be a small left pleural effusion. If there is further concern dedicated PA and lateral views of the chest be considered. Minimal right basilar volume loss. Mild apical pleural parenchymal scarring. Calcified granuloma right midlung. Otherwise, lungs appear clear within the confines the study. No overt pulmonary edema. Cardiac silhouette appears enlarged. Otherwise the stomach contours grossly unremarkable. No upper abdominal findings of concern.       OUTPATIENT  FOLLOW-UP / REFERRALS / PENDING TESTS        Outpatient Follow-Up Appointments            In 2 weeks Xavier Barry  MD [facility]    In 3 months Porsha Short, DO [facility] at [facility]          Referrals  No orders of the defined types were placed in this encounter.      Test Results Pending at Discharge  Unresulted Labs (From admission, onward)            None          Important Issues to Address in Follow-Up  You were evaluated for left side weakness and found to have had an embolic stroke.    You had a loop recorder placed to evaluate for irregular heart rhythm as cause of your stroke.  Follow with Cardiology as outpatient: Dr Barry April 8, 2022 10 at [place] office. Wound check at that time as well.     Follow with Dr Erwin, Neurology, in 2-3 weeks. Call for an appointment, contact info above for your convenience.      -Follow up with your primary care physician within one week.    DISCHARGE DISPOSITION      Disposition: Home     55 minutes spent on patient care and coordination of today's discharge. This includes discussing the discharge plan, discharge medications, and follow up instructions with the patient and/or power of , as well as coordinating care with nursing and consulting physicians.     Pt verbalized understanding and agreement with the plan of discharge, follow up and management.      Code Status At Discharge: Full Code    Physician Order for Life-Sustaining Treatment Document Status      No documents found

## 2022-03-25 NOTE — PROGRESS NOTES
Neurology Progress Note    Subjective     Interval History: The patient was seen and examined this morning. The patient reports that her left leg weakness seems to be improved. No new neurological symptoms reported since she was last seen yesterday.      Current Facility-Administered Medications   Medication Dose Route Frequency Provider Last Rate Last Admin   • acetaminophen (TYLENOL) tablet 650 mg  650 mg oral q4h PRN Racquel Renteria MD       • artificial tears (dextran-hpm-glyc) (GENTEAL TEARS) 0.1-0.3-0.2 % eye drops 1 drop  1 drop Both Eyes 3x daily PRN Racquel Renteria MD       • aspirin enteric coated tablet 81 mg  81 mg oral Daily Macie Jose CRNP   81 mg at 03/25/22 0859   • atorvastatin (LIPITOR) tablet 40 mg  40 mg oral Daily (6p) Macie Jose CRNP   40 mg at 03/24/22 1853   • glucose chewable tablet 16-32 g of dextrose  16-32 g of dextrose oral PRN Racquel Renteria MD        Or   • dextrose 40 % oral gel 15-30 g of dextrose  15-30 g of dextrose oral PRN Racquel Renteria MD        Or   • glucagon (GLUCAGEN) injection 1 mg  1 mg intramuscular PRN Racquel Renteria MD        Or   • dextrose in water injection 12.5 g  25 mL intravenous PRN Racquel Renteria MD       • fluorometholone (FML) 0.1 % ophthalmic suspension 1 drop  1 drop Left Eye BID Racquel Renteria MD   1 drop at 03/25/22 0900   • heparin (porcine) 5,000 unit/mL injection 5,000 Units  5,000 Units subcutaneous q8h ENRIQUETA Macie Jose CRNP   5,000 Units at 03/25/22 0600   • levothyroxine (SYNTHROID) tablet 75 mcg  75 mcg oral Daily (6:30a) Racquel Renteria MD   75 mcg at 03/25/22 0600   • LORazepam (ATIVAN) tablet 0.5 mg  0.5 mg oral Once PRN Macie Jose CRNP       • raloxifene (EVISTA) tablet 60 mg  60 mg oral Daily (6a) Racquel Renteria MD   60 mg at 03/25/22 0600   • valACYclovir (VALTREX) tablet 1,000 mg  1,000 mg oral Daily Racquel Renteria MD   1,000 mg at 03/25/22 0900       Objective      Vital  signs in last 24 hours:  Temp:  [36.3 °C (97.4 °F)-37.1 °C (98.7 °F)] 36.3 °C (97.4 °F)  Heart Rate:  [64-93] 93  Resp:  [16-18] 16  BP: (117-142)/(45-69) 130/69    Physical Exam:     General Appearance: Awake. Not in Acute Distress.   Head: Normocephalic, atraumatic.   Eyes: Pupils were about equal and reacted to light. EOMI.   Neck: Supple, no nuchal rigidity. No apparent bruits.   Respiratory: CTA.   Cardiovascular: Regular rate and rhythm.   Gastrointestinal: Soft, + Bowel sounds.   Extremities: No edema. Old surgical scars in the left thigh and left knee.   Skin: Dry.   Neurologic:  MS: Awake, alert, and oriented x3. Speech was clear. No aphasia. No dysarthria. CN: Pupils were about equal and reacted to light. EOMI. No apparent visual field defects by confrontation testing. No nystagmus or diplopia. No definite facial weakness. Tongue protruded midline. Light touch sensation was grossly normal bilaterally. Motor: No drift in B/L UEs. No weakness in B/L UEs except mildly decreased ROM in her left shoulder (Baseline per the patient, history of left shoulder fracture). No drift/No weakness in RLE. LLE with slight weakness as compared to RLE. No obviously increased muscle tone. DTRs: About 2+ in UEs and at the right knee. Left knee reflex was unable to be elicited, status post multiple left knee surgeries. No ankle clonus.Toes were downgoing B/L. Coordination: Finger to nose was intact bilaterally.   Gait: Not tested at this time.    Behavior/Emotional: Appropriate, cooperative.     LABS:  Results for orders placed or performed during the hospital encounter of 03/23/22   SARS-CoV-2 (COVID-19), PCR Nasopharynx    Specimen: Nasopharynx; Nasopharyngeal Swab   Result Value Ref Range    SARS-CoV-2 (COVID-19) Negative Negative   CBC and differential   Result Value Ref Range    WBC 5.99 3.80 - 10.50 K/uL    RBC 4.90 3.93 - 5.22 M/uL    Hemoglobin 16.6 (H) 11.8 - 15.7 g/dL    Hematocrit 48.7 (H) 35.0 - 45.0 %    MCV 99.4  (H) 83.0 - 98.0 fL    MCH 33.9 (H) 28.0 - 33.2 pg    MCHC 34.1 32.2 - 35.5 g/dL    RDW 12.2 11.7 - 14.4 %    Platelets 179 150 - 369 K/uL    MPV 10.9 9.4 - 12.3 fL    Differential Type Auto     nRBC 0.0 <=0.0 %    Immature Granulocytes 0.3 %    Neutrophils 59.7 %    Lymphocytes 29.9 %    Monocytes 6.7 %    Eosinophils 2.2 %    Basophils 1.2 %    Immature Granulocytes, Absolute 0.02 0.00 - 0.08 K/uL    Neutrophils, Absolute 3.58 1.70 - 7.00 K/uL    Lymphocytes, Absolute 1.79 1.20 - 3.50 K/uL    Monocytes, Absolute 0.40 0.28 - 0.80 K/uL    Eosinophils, Absolute 0.13 0.04 - 0.36 K/uL    Basophils, Absolute 0.07 0.01 - 0.10 K/uL   Comprehensive metabolic panel   Result Value Ref Range    Sodium 141 136 - 144 mEQ/L    Potassium 3.7 3.6 - 5.1 mEQ/L    Chloride 104 98 - 109 mEQ/L    CO2 25 22 - 32 mEQ/L    BUN 17 8 - 20 mg/dL    Creatinine 0.8 0.6 - 1.1 mg/dL    Glucose 150 (H) 70 - 99 mg/dL    Calcium 9.7 8.9 - 10.3 mg/dL    AST (SGOT) 34 15 - 41 IU/L    ALT (SGPT) 18 11 - 54 IU/L    Alkaline Phosphatase 64 35 - 126 IU/L    Total Protein 7.0 6.0 - 8.2 g/dL    Albumin 4.4 3.4 - 5.0 g/dL    Bilirubin, Total 1.0 0.3 - 1.2 mg/dL    eGFR >60.0 >=60.0 mL/min/1.73m*2    Anion Gap 12 3 - 15 mEQ/L   HS Troponin I (with 2 hour reflex)   Result Value Ref Range    High Sens Troponin I 4.4 <15 pg/mL   Protime-INR   Result Value Ref Range    PT 12.8 12.2 - 14.5 sec    INR 1.0     APTT   Result Value Ref Range    PTT 27 23 - 35 sec   UA Reflex to Culture (Macroscopic)    Specimen: Urine, Clean Catch   Result Value Ref Range    Color, Urine Colorless Yellow, Colorless    Clarity, Urine Clear Clear    Specific Gravity, Urine 1.007 1.005 - 1.030    pH, Urine 6.5 4.5 - 8.0    Leukocyte Esterase Negative Negative    Nitrite, Urine Negative Negative    Protein, Urine Negative Negative    Glucose, Urine Negative Negative mg/dL    Ketones, Urine Negative Negative mg/dL    Urobilinogen, Urine 0.2 <2.0 EU/dL EU/dL    Bilirubin, Urine Negative  Negative mg/dL    Blood, Urine Negative Negative   HIGH SENSITIVE TROPONIN I (NO REFLEX)   Result Value Ref Range    High Sens Troponin I 4.4 <15 pg/mL   Basic metabolic panel   Result Value Ref Range    Sodium 141 136 - 144 mEQ/L    Potassium 3.9 3.6 - 5.1 mEQ/L    Chloride 105 98 - 109 mEQ/L    CO2 24 22 - 32 mEQ/L    BUN 17 8 - 20 mg/dL    Creatinine 0.8 0.6 - 1.1 mg/dL    Glucose 87 70 - 99 mg/dL    Calcium 9.1 8.9 - 10.3 mg/dL    eGFR >60.0 >=60.0 mL/min/1.73m*2    Anion Gap 12 3 - 15 mEQ/L   Lipid panel   Result Value Ref Range    Triglycerides 57 30 - 149 mg/dL    Cholesterol 186 <=200 mg/dL    HDL 59 >=55 mg/dL    LDL Calculated 116 (H) <=100 mg/dL    Non-HDL, Calculated 127 mg/dL    RISK 3.2 <=5.0   CBC   Result Value Ref Range    WBC 7.85 3.80 - 10.50 K/uL    RBC 4.44 3.93 - 5.22 M/uL    Hemoglobin 14.8 11.8 - 15.7 g/dL    Hematocrit 44.3 35.0 - 45.0 %    MCV 99.8 (H) 83.0 - 98.0 fL    MCH 33.3 (H) 28.0 - 33.2 pg    MCHC 33.4 32.2 - 35.5 g/dL    RDW 12.2 11.7 - 14.4 %    Platelets 179 150 - 369 K/uL    MPV 11.0 9.4 - 12.3 fL   Basic metabolic panel   Result Value Ref Range    Sodium 139 136 - 144 mEQ/L    Potassium 3.9 3.6 - 5.1 mEQ/L    Chloride 105 98 - 109 mEQ/L    CO2 24 22 - 32 mEQ/L    BUN 18 8 - 20 mg/dL    Creatinine 0.8 0.6 - 1.1 mg/dL    Glucose 100 (H) 70 - 99 mg/dL    Calcium 9.0 8.9 - 10.3 mg/dL    eGFR >60.0 >=60.0 mL/min/1.73m*2    Anion Gap 10 3 - 15 mEQ/L     I reviewed the lab results.    ECG/TELEMETRY/ECHO:  ECG 3/23/2022:  Sinus rhythm with occasional Premature ventricular complexes   Minimal voltage criteria for LVH, may be normal variant if age is < 37 yrs   Cannot rule out Anterior infarct , age undetermined   No previous ECGs available     Transthoracic echo (TTE) complete 3/24/2022:  Interpretation Summary    · Normal-sized LV. Preserved LV systolic function. Estimated EF 65%. No regional wall motion abnormalities. Normal LV wall thickness. Normal diastolic filling pattern for age  of the LV.  · Normal-sized RV. Normal RV systolic function.  · Normal-sized LA. Normal-sized RA.  · Tricuspid aortic valve. Sclerotic aortic valve leaflets. Mild to moderate aortic valve regurgitation. No aortic valve stenosis. Aortic root normal. Ascending aorta normal-sized.  · Mild mitral annular calcification. Mild mitral valve regurgitation. No significant mitral valve stenosis.  · Mild tricuspid valve regurgitation. Estimated RVSP = 26 mmHg.  · Pulmonic valve not well visualized.  · Normal-sized IVC. IVC demonstrates normal respiratory collapse.  · No evidence of pericardial effusion.      IMAGING:  X-RAY KNEE LEFT 4+ VIEWS    Result Date: 3/24/2022  IMPRESSION: No acute osseous abnormality on plain films. Please see above.     CT HEAD WITHOUT IV CONTRAST    Result Date: 3/23/2022  IMPRESSION: No evidence of acute intracranial hemorrhage, mass effect or large acute territorial infarction by CT criteria. Chronic ischemic and microangiopathic changes as discussed below. If there is continued concern consider MRI. COMMENT: Comparison: None TECHNIQUE: CT of the brain was performed without intravenous contrast. CT DOSE:  One or more dose reduction techniques (e.g. automated exposure control, adjustment of the mA and/or kV according to patient size, use of iterative reconstruction technique) utilized for this examination. FINDINGS: Ventricles, sulci and basal cisterns: Normal in size and configuration. Parenchyma: No intracranial hemorrhage, cerebral edema or mass effect. No evidence of acute large territorial infarct. Extensive multifocal white matter disease nonspecific probably microangiopathic. Tiny old right lateral cerebellar lacunar infarcts. Extra-axial spaces: No extra-axial fluid collection. Imaged paranasal sinuses and mastoids: Clear. Calvarium: Within normal limits. Extra calvarial structures:Lens replacements bilaterally. Miscellaneous: Atherosclerotic changes at skull base.    MRI ANGIOGRAM HEAD  WITHOUT CONTRAST  Result Date: 3/25/2022  IMPRESSION: No evidence of hemodynamically significant stenosis or occlusion.    MRI ANGIOGRAM NECK WITHOUT CONTRAST  Result Date: 3/25/2022  IMPRESSION: Please see associated report.     MRI BRAIN WITHOUT CONTRAST  Result Date: 3/24/2022    Intracranial:  Restricted diffusion along the right centrum semiovale extending to the right precentral gyrus subcortical white matter. This is consistent with acute ischemia.  Additional punctate focus of restricted diffusion along the left postcentral gyrus cortex from acute ischemia.     No abnormal foci of susceptibility artifact in the brain. Periventricular and deep white matter change, probably secondary to microangiopathy. This is extensive and also involves the sergei. Chronic linear infarcts in the bilateral cerebellar hemispheres. Prominent perivascular spaces in the bilateral basal ganglia. Patency of the major intracranial vascular flow voids.  No acute  intracranial hemorrhage, mass effect, midline shift.    Extracranial:     Suspected chronic odontoid process fracture.  Degenerative changes in the cervical spine. Visualized parapharyngeal soft tissues and orbits show no focal abnormality. Status post bilateral cataract extraction.  Paranasal sinus are clear.    IMPRESSION: Acute ischemia as above. Likely embolic. Discussed with YFN Dumont, at 1:13 pm on 3/24/2022.    X-RAY CHEST 1 VIEW    Result Date: 3/23/2022  IMPRESSION: Possible patchy pleural parenchymal disease at the left lung base as discussed below. COMMENT: Comparison: None. AP upright portable view of the chest is performed without the benefit of prior studies for comparison. Possible mild left basilar opacity which could represent atelectasis, artifact from overlying structures or possibly pneumonia. There may be a small left pleural effusion. If there is further concern dedicated PA and lateral views of the chest be considered. Minimal right basilar  volume loss. Mild apical pleural parenchymal scarring. Calcified granuloma right midlung. Otherwise, lungs appear clear within the confines the study. No overt pulmonary edema. Cardiac silhouette appears enlarged. Otherwise the stomach contours grossly unremarkable. No upper abdominal findings of concern.     I personally reviewed the patient's neuroimaging studies.    Assessment and Plan:  Assessment   89 y.o. female with a past medical history of hypothyroidism, left femur fracture status post surgery, multiple left knee surgeries, left shoulder fracture, and left elbow surgery, presented on 03/23/2022 with left leg weakness. No other associated neurological symptoms reported. Besides slight left lower extremity weakness, neurological exam showed no other definite findings. Head CT in the ED showed no acute intracranial abnormality. Subsequently, the patient had MRI brain on 03/24/2022, which showed small acute ischemia in the right centrum semioval extending to the right precentral gyrus subcortical white matter and punctate focus of acute ischemia along the left postcentral gyrus cortex, concerning for an embolic event. MRA of head and neck studies were unremarkable. TTE showed no obvious cardiac source of embolism. Telemetry-no atrial fibrillation noted to date.    Plan   -Cardiology was consulted for further cardioembolic source workup such as implantable loop recorder. Will place implantable loop recorder this afternoon for atrial fibrillation detection. The patient will follow up with Dr. Xavier Barry after hospital discharge.   -Continue ASA 81 mg daily.  -Continue statin therapy.  -No reported history of HTN. Monitor and keep normal blood pressure.   -PT.  -Follow-up with neurology after hospital discharge.  NEUROLOGY AT [facility] (Dr. Erwin)  [id number] [place] [address]  TEL: [phone]  FAX: [phone]  -The rest of medical and supportive care per the primary  team.   -The care plan was discussed with the patient and primary team YFN Dumont this morning.     Thank you for allowing me to participate in the care of this patient. If you have any further questions, please do not hesitate to contact me.    Face to face patient counseling and coordinating care > 50% encounter time.  Total encounter time: 35 minutes.    Josselyn Mederos MD   3/25/2022

## 2022-03-25 NOTE — PLAN OF CARE
Problem: Adult Inpatient Plan of Care  Goal: Plan of Care Review  Flowsheets (Taken 3/25/2022 5772)  Outcome Summary: Discharge ordered. Met at bedside w/ patient. Did well w/ PT/OT; has rollator w/ her. Family to transport back to [facility]

## 2022-03-25 NOTE — PLAN OF CARE
Problem: Adult Inpatient Plan of Care  Goal: Plan of Care Review  Outcome: Met  Flowsheets (Taken 3/25/2022 1116)  Plan of Care Reviewed With: patient  Outcome Summary: Pt seen for OT eval. Pt is mod I for bed mobility, fxl transfers/mobility with rollator, toilet/shower transfer and LBD. Pt appears to be functioning close to baseline and was receptive to all safety education for dc home. Rec return home to [facility] upon dc

## 2022-03-25 NOTE — ASSESSMENT & PLAN NOTE
MRI brain: small acute ischemia in the right centrum semioval extending to the right precentral gyrus subcortical white matter and punctate focus of acute ischemia along the left postcentral gyrus cortex, concerning for an embolic event    -Continue ASA 81 mg daily  -Continue statin therapy  -Cardiology was consulted for further cardioembolic source workup  -S/p loop recorder placement 3/25/22  -Follow with Neurology as outpatient  -Follow with Cardiology as outpatient: Dr Barry April 8, 2022 10 at [place] office. Wound check at that time as well.

## 2022-03-25 NOTE — PROGRESS NOTES
Patient:  Catia Ward  Location:  [address]  MRN:  [id number]  Today's date:  3/25/2022   RN cleared for session. Pt left bedside chair with personal items in place and call bell within reach. Pt reports no further questions for OT at this time and all needs are met. RN notified      Catia is a 89 y.o. female admitted on 3/23/2022 with Transient left leg weakness [R29.898]  Weakness of left lower extremity [R29.898]  Acute CVA (cerebrovascular accident) (CMS/HCC) [I63.9]. Principal problem is Transient left leg weakness.    Past Medical History  Catia has a past medical history of Atopic keratoconjunctivitis, Corneal ulcer of both eyes, Femur fracture (CMS/[facility]), Hypothyroidism, S/P knee replacement (2012), and Shoulder fracture, left.    History of Present Illness   Pt presents with LLE weakness and difficulty ambulating. MRI brain showed small acute ischemia in the right centrum semiovale extending to the right precentral gyrus subcortical white matter and punctate focus of acute ischemia along the left postcentral gyrus cortex, concerning for an embolic event.       OT Vitals    Date/Time Pulse HR Source SpO2 Pt Activity O2 Therapy BP BP Location BP Method Pt Position [facility]   03/25/22 1033 93 Monitor 95 % At rest None (Room air) 130/69 Left upper arm Automatic Lying ESSIE   03/25/22 1050 93 -- 95 % At rest None (Room air) 130/69 -- -- -- EMP      OT Pain    Date/Time Pain Type Side/Orientation Location Rating: Rest Rating: Activity [facility]   03/25/22 1033 Pain Assessment right other (see comments) Thumb 2 - mild pain 2 - mild pain ESSIE   03/25/22 1050 Pain Assessment right finger 2 - mild pain 2 - mild pain EMP          Prior Living Environment    Flowsheet Row Most Recent Value   People in Home alone   Current Living Arrangements independent living facility   Living Environment Comment Pt lives alone at [facility] with elevator access as needed        Prior Level of Function    Flowsheet Row  Most Recent Value   Dominant Hand right   Ambulation assistive equipment   Transferring assistive equipment   Toileting independent   Bathing independent   Dressing independent   Eating independent   Prior Level of Function Comment Pt reports Mod(I) PLOF with use of rollator for community ambulation and ocassionaly household ambulation.   Assistive Device Currently Used at Home bath bench, grab bar, other (see comments)  [Rollator]        Occupational Profile    Flowsheet Row Most Recent Value   Reason for Services/Referral ADL / amb dysfunction   Successful Occupations retired   Environmental Supports and Barriers resides ILF           OT Evaluation and Treatment - 03/25/22 1031        OT Time Calculation    Start Time 1031     Stop Time 1053     Time Calculation (min) 22 min        Session Details    Document Type initial evaluation     Mode of Treatment occupational therapy        General Information    Patient Profile Reviewed yes     Onset of Illness/Injury or Date of Surgery 03/23/22     Referring Physician Lorenzo     Patient/Family/Caregiver Comments/Observations RN cleared for session     General Observations of Patient pt rec'd supine in bed, agreeable to participate in therapy     Existing Precautions/Restrictions fall        Cognition/Psychosocial    Affect/Mental Status (Cognition) WFL     Orientation Status (Cognition) oriented x 4     Follows Commands (Cognition) WFL     Cognitive Function WFL     Comment, Cognition pt is pleasant and cooperative, follows all commands appropriately        Hearing Assessment    Hearing Status WFL        Vision Assessment/Intervention    Visual Impairment/Limitations blurry vision   baselien       Sensory Assessment (Somatosensory)    Sensory Assessment (Somatosensory) UE sensation intact        Range of Motion (ROM)    Range of Motion ROM is WFL;bilateral upper extremities   hx L shoulder injury       Strength (Manual Muscle Testing)    Strength (Manual Muscle Testing)  strength is WFL;bilateral upper extremities   4-/5 grossly       Bed Mobility    [place], Supine to Sit independent     Assistive Device bed rails     Comment (Bed Mobility) HOB flat, good core control and strength        Transfers    Transfers toilet transfer;shower transfer        Sit to Stand Transfer    [place], Sit to Stand Transfer modified independence     Assistive Device --   rollator    Comment good strength rising into stance with good rollator safety        Stand to Sit Transfer    [place], Stand to Sit Transfer modified independence     Assistive Device --   rollator    Comment good safety awareness and controlled descent        Toilet Transfer    Transfer Technique sit-stand;stand-sit     [place], Toilet Transfer modified independence     Assistive Device grab bars/safety frame     Comment off standard height toilet with use of grab bars for support        Shower Transfer    Comment reviewed side step entry and grab bar use/safety with setup, pt receptive        Balance    Balance Assessment sitting static balance;sitting dynamic balance;sit to stand dynamic balance;standing static balance;standing dynamic balance     Static Sitting Balance WFL     Dynamic Sitting Balance WFL     Sit to Stand Dynamic Balance WFL     Static Standing Balance WFL     Dynamic Standing Balance WFL     Comment, Balance mod I with rollator, reinforced use of rollator at all times upon dc for safety        Motor Skills    Comment, Motor Skills coordination intact with no deficits noted        Upper Body Dressing    Comment demonstrated fxl overhead and behind back reach with unilateral dressing technique strategies 2/2 baseline L shoulder limited ROM from previous injuries        Lower Body Dressing    Tasks don;socks     [place] independent     Position edge of bed sitting     Comment via crossover technique        BADL Safety/Performance    Impairments, BADL Safety/Performance endurance/activity  tolerance     Skilled BADL Treatment/Intervention BADL process/adaptation training     Progress in BADL Status independence level        AM-PAC (TM) - ADL (Current Function)    Putting on and taking off regular lower body clothing? 4 - None     Bathing? 4 - None     Toileting? 4 - None     Putting on/taking off regular upper body clothing? 4 - None     How much help for taking care of personal grooming? 4 - None     Eating meals? 4 - None     AM-PAC (TM) ADL Score 24        Assessment/Plan (OT)    Daily Outcome Statement Pt seen for OT eval. Pt is mod I for bed mobility, fxl transfers/mobility with rollator, toilet/shower transfer and LBD. Pt appears to be functioning close to baseline and was receptive to all safety education for dc home. Rec return home to [facility] upon dc     Rehab Potential good, to achieve stated therapy goals     Therapy Frequency evaluation only               OT Assessment/Plan    Flowsheet Row Most Recent Value   OT Recommended Discharge Disposition home at 03/25/2022 1031   Anticipated Equipment Needs At Discharge (OT) --  [grab bars in shower] at 03/25/2022 1031   Patient/Family Therapy Goal Statement to go home at 03/25/2022 1031                    Education Documentation  Treatment Plan, taught by Milly Smith OT at 3/25/2022  2:50 PM.  Learner: Patient  Readiness: Acceptance  Method: Explanation  Response: Verbalizes Understanding  Comment: transfer safety, adl management, grab bar placement for shower and benefit of rollator use at all times

## 2022-03-25 NOTE — ASSESSMENT & PLAN NOTE
- Telemetry per stroke protocol--no PAF noted to date  - Daily aspirin and statin started this admission   - Indications/risks for placement of implantable LOOP recorder discussed with patient. She has given verbal consent to proceed. Scheduled for 1300 today  - F/u with Dr Asha Ledbetter 8, 2022 10 at [place] office. Wound check at that time as well.

## 2022-03-25 NOTE — PLAN OF CARE
Problem: Adult Inpatient Plan of Care  Goal: Plan of Care Review  Outcome: Progressing  Flowsheets (Taken 3/25/2022 1127)  Plan of Care Reviewed With: patient  Outcome Summary: PT eval completed. Pt Mod(I) for functional mobility with use of rollator for ambulation into hallway. Mild LLE strength deficits noted on exam however pt did not require any physical assistance for funcitonal tasks and felt she was ambulating at her baseline level. Pt is cleared from PT with d/c recommenation home.  Goal: Patient-Specific Goal (Individualized)  Outcome: Progressing  Flowsheets (Taken 3/25/2022 1127)  Patient-Specific Goals (Include Timeframe): To go home     Problem: Mobility Impairment  Goal: Optimal Mobility  Outcome: Progressing

## 2022-03-25 NOTE — CONSULTS
Inpatient Cardiology   Consult Note       Overview:Very active 88 yo, lives independently at RV. Presents with persistent LLE weakness. MRI confirmed bilateral stroke. LHG consulted to r/o cardio-embolic source of CVA     Assessment/Plan   * Acute CVA (cerebrovascular accident) (CMS/HCC)  Overview  - MRI March 24, 2022--bilateral CVA (Embolic?). Restricted diffusion along the right centrum semiovale extending to the right precentral gyrus subcortical white matter.  This is consistent with acute ischemia.  Additional punctate focus of restricted diffusion along the left postcentral gyrus cortex from acute ischemia    Assessment & Plan  - Telemetry per stroke protocol--no PAF noted to date  - Daily aspirin and statin started this admission   - Indications/risks for placement of implantable LOOP recorder discussed with patient. She has given verbal consent to proceed. Scheduled for 1300 today  - F/u with Dr Asha Ledbetter 8, 2022 10 at [place] office. Wound check at that time as well.       Nonrheumatic mitral valve regurgitation  Overview  - Mild MR on ECHO March 23, 2022    Assessment & Plan  - Follow clinically     Hypothyroidism  Assessment & Plan  - Synthroid replacement per [facility] [place]          Subjective Left leg weakness   HPI: Very active 88 yo, lives independently at RV. Presents with persistent LLE weakness. Pt had a traumatic left knee injury several years ago and required 3 surgical interventions on the joint. She only uses a walker at night for stability of the LLE. When she awakened to ambulate to the BR she noted LLE weakness. When the weakness was persistent in the am she presented to the ER. MRI confirmed bilateral stroke. LHG consulted to r/o cardio-embolic source of CVA. No Neuro deficits noted. She denies ha, dizziness, cp, palpitations, sob, fatigue or activity intolerance     Review of Systems   Constitutional: Negative for diaphoresis and fever.   Cardiovascular: Negative for chest pain and dyspnea on  exertion.   Respiratory: Negative for cough and shortness of breath.    Musculoskeletal: Positive for arthritis and joint pain. Negative for falls.   All other systems reviewed and are negative.     Histories:    Medical History:   Past Medical History:   Diagnosis Date   • Atopic keratoconjunctivitis    • Corneal ulcer of both eyes    • Femur fracture (CMS/HCC)    • Hypothyroidism    • S/P knee replacement 2012   • Shoulder fracture, left        Surgical History:   Past Surgical History:   Procedure Laterality Date   • ADENOIDECTOMY     • CATARACT EXTRACTION     • JOINT REPLACEMENT     • KERATOPLASTY     • TONSILLECTOMY         Social History:    Social History     Tobacco Use   Smoking Status Never Smoker   Smokeless Tobacco Never Used     Social History     Social History Narrative    Lives at [facility] - independent apartment      Independent with ADLS    Walk with walker only at nights       Family History:   Family History   Problem Relation Age of Onset   • Diabetes Biological Brother    • Lung cancer Biological Brother         Allergies:   Codeine, Gentamicin, and Gentamicin sulfate   Current Medications:    Scheduled:  • aspirin  81 mg oral Daily   • atorvastatin  40 mg oral Daily (6p)   • fluorometholone  1 drop Left Eye BID   • heparin (porcine)  5,000 Units subcutaneous q8h ENRIQUETA   • levothyroxine  75 mcg oral Daily (6:30a)   • raloxifene  60 mg oral Daily (6a)   • valACYclovir  1,000 mg oral Daily       Infusions:      PRN:  •  acetaminophen  •  artificial tears  •  glucose **OR** dextrose **OR** glucagon **OR** dextrose in water  •  LORazepam     Objective   Vital signs in last 24 hours:  Temp:  [36.3 °C (97.4 °F)-37.1 °C (98.7 °F)] 36.3 °C (97.4 °F)  Heart Rate:  [64-93] 93  Resp:  [16-18] 16  BP: (117-142)/(45-69) 130/69    Wt Readings from Last 1 Encounters:   03/24/22 61.2 kg (135 lb)       Physical Exam  Vitals reviewed.   Constitutional:       Appearance: She is well-developed.      Comments:  Appears younger than stated age   HENT:      Head: Normocephalic.      Mouth/Throat:      Mouth: Mucous membranes are moist.   Cardiovascular:      Rate and Rhythm: Normal rate and regular rhythm.      Heart sounds: No murmur heard.  Pulmonary:      Effort: Pulmonary effort is normal.      Breath sounds: Normal breath sounds.   Abdominal:      General: Bowel sounds are normal.      Palpations: Abdomen is soft.   Musculoskeletal:         General: Deformity (left knee) present.      Cervical back: Normal range of motion.   Neurological:      Mental Status: She is alert and oriented to person, place, and time.   Psychiatric:         Mood and Affect: Mood normal.         Behavior: Behavior normal.         Thought Content: Thought content normal.         Judgment: Judgment normal.            Labs and Data:     Hematology  Lab Results   Component Value Date    WBC 7.85 03/25/2022    HGB 14.8 03/25/2022    HCT 44.3 03/25/2022     03/25/2022    INR 1.0 03/23/2022       Chemistries  Lab Results   Component Value Date     03/25/2022    K 3.9 03/25/2022     03/25/2022    CREATININE 0.8 03/25/2022    BUN 18 03/25/2022    CO2 24 03/25/2022    GLUCOSE 100 (H) 03/25/2022    CALCIUM 9.0 03/25/2022    ALT 18 03/23/2022    AST 34 03/23/2022       Biomarkers  Lab Results   Component Value Date    HSTROPONINI 4.4 03/23/2022    HSTROPONINI 4.4 03/23/2022       Cholesterol  Lab Results   Component Value Date    CHOL 186 03/24/2022    TRIG 57 03/24/2022    HDL 59 03/24/2022    LDLCALC 116 (H) 03/24/2022    NONHDLCALC 127 03/24/2022       Endocrine  Lab Results   Component Value Date    TSH 1.91 02/24/2022      Radiology:       X-RAY KNEE LEFT 4+ VIEWS    Result Date: 3/24/2022  IMPRESSION: No acute osseous abnormality on plain films. Please see above.     CT HEAD WITHOUT IV CONTRAST    Result Date: 3/23/2022  IMPRESSION: No evidence of acute intracranial hemorrhage, mass effect or large acute territorial infarction by  CT criteria. Chronic ischemic and microangiopathic changes as discussed below. If there is continued concern consider MRI. COMMENT: Comparison: None TECHNIQUE: CT of the brain was performed without intravenous contrast. CT DOSE:  One or more dose reduction techniques (e.g. automated exposure control, adjustment of the mA and/or kV according to patient size, use of iterative reconstruction technique) utilized for this examination. FINDINGS: Ventricles, sulci and basal cisterns: Normal in size and configuration. Parenchyma: No intracranial hemorrhage, cerebral edema or mass effect. No evidence of acute large territorial infarct. Extensive multifocal white matter disease nonspecific probably microangiopathic. Tiny old right lateral cerebellar lacunar infarcts. Extra-axial spaces: No extra-axial fluid collection. Imaged paranasal sinuses and mastoids: Clear. Calvarium: Within normal limits. Extra calvarial structures:Lens replacements bilaterally. Miscellaneous: Atherosclerotic changes at skull base.    MRI ANGIOGRAM HEAD WITHOUT CONTRAST    Result Date: 3/25/2022  IMPRESSION: No evidence of hemodynamically significant stenosis or occlusion.    MRI ANGIOGRAM NECK WITHOUT CONTRAST    Result Date: 3/25/2022  IMPRESSION: Please see associated report.     MRI BRAIN WITHOUT CONTRAST    Result Date: 3/24/2022  IMPRESSION: Acute ischemia as above. Likely embolic. Discussed with YFN Duomnt, at 1:13 pm on 3/24/2022.    X-RAY CHEST 1 VIEW    Result Date: 3/23/2022  IMPRESSION: Possible patchy pleural parenchymal disease at the left lung base as discussed below. COMMENT: Comparison: None. AP upright portable view of the chest is performed without the benefit of prior studies for comparison. Possible mild left basilar opacity which could represent atelectasis, artifact from overlying structures or possibly pneumonia. There may be a small left pleural effusion. If there is further concern dedicated PA and lateral views  of the chest be considered. Minimal right basilar volume loss. Mild apical pleural parenchymal scarring. Calcified granuloma right midlung. Otherwise, lungs appear clear within the confines the study. No overt pulmonary edema. Cardiac silhouette appears enlarged. Otherwise the stomach contours grossly unremarkable. No upper abdominal findings of concern.        Cardiology:    Telemetry  NSR     ECG March 23, 2022: NSR, PVCs      Cardiac Imaging    TRANSTHORACIC ECHO (TTE) COMPLETE 03/24/2022    Interpretation Summary  · Normal-sized LV. Preserved LV systolic function. Estimated EF 65%. No regional wall motion abnormalities. Normal LV wall thickness. Normal diastolic filling pattern for age of the LV.  · Normal-sized RV. Normal RV systolic function.  · Normal-sized LA. Normal-sized RA.  · Tricuspid aortic valve. Sclerotic aortic valve leaflets. Mild to moderate aortic valve regurgitation. No aortic valve stenosis. Aortic root normal. Ascending aorta normal-sized.  · Mild mitral annular calcification. Mild mitral valve regurgitation. No significant mitral valve stenosis.  · Mild tricuspid valve regurgitation. Estimated RVSP = 26 mmHg.  · Pulmonic valve not well visualized.  · Normal-sized IVC. IVC demonstrates normal respiratory collapse.  · No evidence of pericardial effusion.         YFN Keyes  3/25/2022

## 2022-03-25 NOTE — HOSPITAL COURSE
Catia is a 89 y.o. female admitted on 3/23/2022 with Transient left leg weakness [R29.898]  Weakness of left lower extremity [R29.898]  Acute CVA (cerebrovascular accident) (CMS/[facility]) [I63.9]. Principal problem is Transient left leg weakness.    Past Medical History  Catia has a past medical history of Atopic keratoconjunctivitis, Corneal ulcer of both eyes, Femur fracture (CMS/[facility]), Hypothyroidism, S/P knee replacement (2012), and Shoulder fracture, left.    History of Present Illness   Pt presents with LLE weakness and difficulty ambulating. MRI brain showed small acute ischemia in the right centrum semiovale extending to the right precentral gyrus subcortical white matter and punctate focus of acute ischemia along the left postcentral gyrus cortex, concerning for an embolic event.

## 2022-03-25 NOTE — PROGRESS NOTES
Patient: Catia Ward  Location:  [address]  MRN:  [id number]  Today's date:  3/25/2022    Pt finished session seated in bedside chair, call bell in reach. RN notified.     Catia is a 89 y.o. female admitted on 3/23/2022 with Transient left leg weakness [R29.898]  Weakness of left lower extremity [R29.898]  Acute CVA (cerebrovascular accident) (CMS/HCC) [I63.9]. Principal problem is Transient left leg weakness.    Past Medical History  Catia has a past medical history of Atopic keratoconjunctivitis, Corneal ulcer of both eyes, Femur fracture (CMS/HCC), Hypothyroidism, S/P knee replacement (2012), and Shoulder fracture, left.    History of Present Illness   Pt presents with LLE weakness and difficulty ambulating. MRI brain showed small acute ischemia in the right centrum semiovale extending to the right precentral gyrus subcortical white matter and punctate focus of acute ischemia along the left postcentral gyrus cortex, concerning for an embolic event.       PT Vitals    Date/Time Pulse HR Source SpO2 Pt Activity O2 Therapy BP BP Location BP Method Pt Position [facility]   03/25/22 1033 93 Monitor 95 % At rest None (Room air) 130/69 Left upper arm Automatic Lying ESSIE   03/25/22 1050 93 -- 95 % At rest None (Room air) 130/69 -- -- -- EMP      PT Pain    Date/Time Pain Type Side/Orientation Location Rating: Rest Rating: Activity [facility]   03/25/22 1033 Pain Assessment right other (see comments) Thumb 2 - mild pain 2 - mild pain ESSIE   03/25/22 1050 Pain Assessment right finger 2 - mild pain 2 - mild pain EMP          Prior Living Environment    Flowsheet Row Most Recent Value   People in Home alone   Current Living Arrangements independent living facility   Living Environment Comment Pt lives alone at [facility] with elevator access as needed        Prior Level of Function    Flowsheet Row Most Recent Value   Dominant Hand right   Ambulation assistive equipment   Transferring assistive equipment    Toileting independent   Bathing independent   Dressing independent   Eating independent   Prior Level of Function Comment Pt reports Mod(I) PLOF with use of rollator for community ambulation and ocassionaly household ambulation.   Assistive Device Currently Used at Home bath bench, grab bar, other (see comments)  [Rollator]           PT Evaluation and Treatment - 03/25/22 1033        PT Time Calculation    Start Time 1033     Stop Time 1056     Time Calculation (min) 23 min        Session Details    Document Type initial evaluation     Mode of Treatment physical therapy        General Information    Patient Profile Reviewed yes     Onset of Illness/Injury or Date of Surgery 03/23/22     Referring Physician Lorenzo     General Observations of Patient Pt received supine in bed. RN cleared for PT     Existing Precautions/Restrictions fall        Living Environment    Primary Care Provided by self        Cognition/Psychosocial    Affect/Mental Status (Cognition) WFL     Orientation Status (Cognition) oriented x 4     Follows Commands (Cognition) WFL     Cognitive Function WFL        Sensory    Hearing Status hearing aid, bilateral        Vision Assessment/Intervention    Visual Impairment/Limitations blurry vision        Sensory Assessment (Somatosensory)    Sensory Assessment (Somatosensory) LE sensation intact        Range of Motion (ROM)    Range of Motion bilateral lower extremities;ROM is WFL        Strength (Manual Muscle Testing)    Hip, Left (Strength) 4/5     Knee, Left (Strength) 4+/5     Ankle, Left (Strength) 4/5     Hip, Right (Strength) 5/5     Knee, Right (Strength) 5/5     Ankle, Right (Strength) 5/5        Bed Mobility    [place], Supine to Sit independent     Assistive Device bed rails        Sit to Stand Transfer    [place], Sit to Stand Transfer modified independence     Assistive Device other (see comments)   EOB and rollator    Comment Pt required no physical assistace for STS transfer  from EOB; bed not required to be elevated        Stand to Sit Transfer    [place], Stand to Sit Transfer modified independence     Assistive Device other (see comments)   Rollator    Comment Pt returned to sitting in bedside chair        Gait Training    [place], Gait modified independence     Assistive Device other (see comments)   Rollator    Distance in Feet 50 feet     Pattern (Gait) step-through     Deviations/Abnormal Patterns (Gait) gait speed decreased;josé miguel decreased     Comment (Gait/Stairs) Pt ambulated into hallway with rollator, no LOB or L sided gait deviations        Stairs Training    Comment Pt does not need to navigate stairs        Safety Issues, Functional Mobility    Impairments Affecting Function (Mobility) balance;strength;endurance/activity tolerance        Balance    Static Sitting Balance WFL     Dynamic Sitting Balance WFL     Static Standing Balance WFL;supported   rollator    Dynamic Standing Balance WFL;supported   Rollator       AM-PAC (TM) - Mobility (Current Function)    Turning from your back to your side while in a flat bed without using bedrails? 4 - None     Moving from lying on your back to sitting on the side of a flat bed without using bedrails? 4 - None     Moving to and from a bed to a chair? 4 - None     Standing up from a chair using your arms? 4 - None     To walk in a hospital room? 4 - None     Climbing 3-5 steps with a railing? 3 - A Little     AM-PAC (TM) Mobility Score 23        Assessment/Plan (PT)    Daily Outcome Statement PT jony completed. Pt Mod(I) for functional mobility with use of rollator for ambulation into hallway. Mild LLE strength deficit noted however, pt able to perform functional tasks (STS, gait, bed mobility) without physical assistance. Pt reports she feels she was able to ambulate at her baseline level and is cleared from PT with d/c recommendation home.     Rehab Potential other (see comments)   Pt cleared from PT    Therapy  Frequency evaluation only               PT Assessment/Plan    Flowsheet Row Most Recent Value   PT Recommended Discharge Disposition home at 03/25/2022 1033   Anticipated Equipment Needs at Discharge (PT) none at 03/25/2022 1033   Patient/Family Therapy Goals Statement To go home at 03/25/2022 1033                    Education Documentation  Treatment Plan, taught by Dominique Miguel at 3/25/2022 11:29 AM.  Learner: Patient  Readiness: Acceptance  Method: Explanation  Response: Verbalizes Understanding  Comment: Pt educated on role of PT, PT POC, and safety with mobility following d/c.

## 2022-03-25 NOTE — PLAN OF CARE
Plan of Care Review  Plan of Care Reviewed With: patient, daughter  Progress: improving  Outcome Summary: pt discharged. avs reviewed with patient and daughter whom both verbalized undestanding. pt discharged via w/c with all personal belongings. IV and tele monitor removed.

## 2022-03-28 ENCOUNTER — PATIENT OUTREACH (OUTPATIENT)
Dept: CASE MANAGEMENT | Facility: CLINIC | Age: 87
End: 2022-03-28
Payer: MEDICARE

## 2022-03-28 ENCOUNTER — TELEPHONE (OUTPATIENT)
Dept: INTERNAL MEDICINE | Facility: CLINIC | Age: 87
End: 2022-03-28

## 2022-03-28 DIAGNOSIS — I63.9 CEREBROVASCULAR ACCIDENT (CVA), UNSPECIFIED MECHANISM (CMS/HCC): ICD-10-CM

## 2022-03-28 DIAGNOSIS — R29.898 LEFT LEG WEAKNESS: Primary | ICD-10-CM

## 2022-03-28 ASSESSMENT — ENCOUNTER SYMPTOMS
APPETITE CHANGE: 0
NAUSEA: 0
VOMITING: 0
DIARRHEA: 0
SHORTNESS OF BREATH: 0
LIGHT-HEADEDNESS: 0
DIFFICULTY URINATING: 0
ABDOMINAL PAIN: 0
DIZZINESS: 0

## 2022-03-28 NOTE — TELEPHONE ENCOUNTER
rx created for physical therapy, please fax  Please let PT know that she will absolutely need to continue walker use at least for now to stay safe

## 2022-03-28 NOTE — PROGRESS NOTES
NAME: Catia Ward    MRN: [id number]    YOB: 1932    Event Review:    Initial TCM Patient Outreach Date: 03/28/22    Assessment completed with: Patient  Patient stated reason for hospitalization: Lower extremity weakness, had a small stroke  Discharge Diagnosis: Acute Cerebral Accident    Patient readmitted in the last 30 days: No  Discharging Facility: [facility]  Date of Admission: 03/23/22  Date of Discharge: 03/25/22    Patient's perception of their health status since discharge: Improving    HPI:  Spoke with pt today, pt presented to [place] emergency room with left lower extremity weakness, leg felt like wood of 3/23/2022.  Pt had MRI which showed pt had small stroke.  Pt had loop recorder placed on 3/25/2022.  Symptoms improved and pt discharged to home on 3/25/2022.    Spoke with pt today, doing okay.  Pt lives alone in [facility].  Pt ambulating with walker.      Pt states that her left leg is back to normal but she is afraid to ambulate without walker.  Pt would like to be evaluated by physical therapy.  In basket message sent to PCP.     Discharge instructions reviewed with pt.  Pt verbalizes understanding.    Pt declined follow-up at this time, will follow-up with specialist.    Review of Systems   Constitutional: Negative for appetite change.   Respiratory: Negative for shortness of breath.    Cardiovascular: Negative for chest pain.   Gastrointestinal: Negative for abdominal pain, diarrhea, nausea and vomiting.   Genitourinary: Negative for difficulty urinating.   Skin:        Loop recorder incision clean, dry and intact.  Area down to breast with ecchymosis.   Neurological: Negative for dizziness and light-headedness.       Medication Review:    Medication Review: Yes     Reported by: Patient  Any new medications prescribed at discharge?: Yes  Is the patient having any side effects they believe may be caused by any medication additions or changes?: No  New prescriptions  filled?: Yes     Do you have enough of your regularly prescribed medications?: Yes       Medication adherence problem?: No  Was a medication discrepancy indentified?: No       Nursing Interventions: No intervention needed  Reconciled the current and discharge medications: Yes  Reviewed AVS (Discharge Instructions)?: Yes       Acute Pain:    Acute pain: No     Chronic Pain:    Chronic pain: No     Diet/Nutrition:    Type of Diet: Regular, Cardiac 2mg sodium  Diet Adherence: Adherent with diet      Home Care Services:    Post-Discharge Durable Medical Equipment::    Durable Medical Equipment: Front wheeled walker  Oxygen Use: No   Home Management:    Living Arrangement: Alone ([facility])  Support System:: Family  Type of Residence: [address] [place] [address]     Any patient reported falls in the last 3 months?: No  Congregation or spiritual beliefs that impact treatment?: No    Appointment Scheduling:     Patient Scheduling Dispositions: Pt prefers to see specialist     Follow-Ups:     Relevant Specialist Follow-ups: Dr. Barry 4/8/2022  Dr. Erwin   to be scheduled    Interventions/ Care Coordination:    Interventions/ Care Coordination: Encouraged patient to call PCP/Specialist, Encouraged patient to schedule with the PCP/Specialist  General Education: Respiratory precautions (flu, colds, pneumonia, COVID-19)       Reviewed signs/symptoms of worsening condition or complication that necessitate a call to the Physician's office.  Educated patient on access to care.  RN phone number given for future care management.    Nell Du RN BSN   [phone]

## 2022-03-28 NOTE — TELEPHONE ENCOUNTER
----- Message from YFN Cotton sent at 3/28/2022  4:24 PM EDT -----  Regarding: FW: Physical therapy    ----- Message -----  From: Chanel Du CM  Sent: 3/28/2022  11:39 AM EDT  To: Porsha Short,   Subject: Physical therapy                                 Dr. Short,    TCM complete.  Pt would like a physical therapy evaluation for her left leg.  Pt states she is afraid to walk without walker since hospitalization.    Would you be able to fax script to [facility] PT for evaluation?    Please advise.    Thank you,  Nell Du RN BSN   [phone]

## 2022-03-29 ENCOUNTER — LAB REQUISITION (OUTPATIENT)
Dept: LAB | Facility: HOSPITAL | Age: 87
End: 2022-03-29
Attending: INTERNAL MEDICINE
Payer: MEDICARE

## 2022-03-29 DIAGNOSIS — M35.3 POLYMYALGIA RHEUMATICA (CMS/HCC): ICD-10-CM

## 2022-03-29 LAB
ALBUMIN SERPL-MCNC: 3.6 G/DL (ref 3.4–5)
ALP SERPL-CCNC: 70 IU/L (ref 35–126)
ALT SERPL-CCNC: 17 IU/L (ref 11–54)
ANION GAP SERPL CALC-SCNC: 10 MEQ/L (ref 3–15)
AST SERPL-CCNC: 30 IU/L (ref 15–41)
BASOPHILS # BLD: 0.05 K/UL (ref 0.01–0.1)
BASOPHILS NFR BLD: 0.7 %
BILIRUB SERPL-MCNC: 0.9 MG/DL (ref 0.3–1.2)
BUN SERPL-MCNC: 21 MG/DL (ref 8–20)
CALCIUM SERPL-MCNC: 9.7 MG/DL (ref 8.9–10.3)
CHLORIDE SERPL-SCNC: 105 MEQ/L (ref 98–109)
CO2 SERPL-SCNC: 23 MEQ/L (ref 22–32)
CREAT SERPL-MCNC: 0.9 MG/DL (ref 0.6–1.1)
CRP SERPL-MCNC: <6 MG/L
DIFFERENTIAL METHOD BLD: ABNORMAL
EOSINOPHIL # BLD: 0.19 K/UL (ref 0.04–0.36)
EOSINOPHIL NFR BLD: 2.8 %
ERYTHROCYTE [DISTWIDTH] IN BLOOD BY AUTOMATED COUNT: 12.6 % (ref 11.7–14.4)
ERYTHROCYTE [SEDIMENTATION RATE] IN BLOOD BY WESTERGREN METHOD: <3 MM/HR
GFR SERPL CREATININE-BSD FRML MDRD: 59.1 ML/MIN/1.73M*2
GLUCOSE SERPL-MCNC: 101 MG/DL (ref 70–99)
HCT VFR BLDCO AUTO: 45.5 % (ref 35–45)
HGB BLD-MCNC: 14.7 G/DL (ref 11.8–15.7)
IMM GRANULOCYTES # BLD AUTO: 0.02 K/UL (ref 0–0.08)
IMM GRANULOCYTES NFR BLD AUTO: 0.3 %
LYMPHOCYTES # BLD: 2.95 K/UL (ref 1.2–3.5)
LYMPHOCYTES NFR BLD: 44.1 %
MCH RBC QN AUTO: 33.2 PG (ref 28–33.2)
MCHC RBC AUTO-ENTMCNC: 32.3 G/DL (ref 32.2–35.5)
MCV RBC AUTO: 102.7 FL (ref 83–98)
MONOCYTES # BLD: 0.6 K/UL (ref 0.28–0.8)
MONOCYTES NFR BLD: 9 %
NEUTROPHILS # BLD: 2.88 K/UL (ref 1.7–7)
NEUTS SEG NFR BLD: 43.1 %
NRBC BLD-RTO: 0 %
PDW BLD AUTO: 12.2 FL (ref 9.4–12.3)
PLATELET # BLD AUTO: 185 K/UL (ref 150–369)
POTASSIUM SERPL-SCNC: 4.1 MEQ/L (ref 3.6–5.1)
PROT SERPL-MCNC: 5.6 G/DL (ref 6–8.2)
RBC # BLD AUTO: 4.43 M/UL (ref 3.93–5.22)
SODIUM SERPL-SCNC: 138 MEQ/L (ref 136–144)
WBC # BLD AUTO: 6.69 K/UL (ref 3.8–10.5)

## 2022-03-29 PROCEDURE — 85025 COMPLETE CBC W/AUTO DIFF WBC: CPT | Performed by: INTERNAL MEDICINE

## 2022-03-29 PROCEDURE — 80053 COMPREHEN METABOLIC PANEL: CPT | Performed by: INTERNAL MEDICINE

## 2022-03-29 PROCEDURE — 86140 C-REACTIVE PROTEIN: CPT | Performed by: INTERNAL MEDICINE

## 2022-03-29 PROCEDURE — 85652 RBC SED RATE AUTOMATED: CPT | Performed by: INTERNAL MEDICINE

## 2022-03-29 PROCEDURE — 36415 COLL VENOUS BLD VENIPUNCTURE: CPT | Performed by: INTERNAL MEDICINE

## 2022-04-05 NOTE — PROGRESS NOTES
Cardiology  Office Progress Note         Reason for visit:   Chief Complaint   Patient presents with   • Hospital Discharge Follow-up   Cryptogenic stroke    HPI     Catia Ward is a 89 y.o. female who presents to the office for cardiovascular follow up and management of Acute CVA with loop recorder placed.    She was recently hospitalized 3/23 for LLE weakness and found to have an acute CVA on MRI. [facility] was consulted and she completed a TTE which showed an EF 65%, no regional wall abnormalities and no obvious cardiac source for an embolism. She had no afib on telemetry while hospitalized. She agreed to have a loop recorder placed which was placed on 3/25/21. She was started on ASA and statin.    She returns today as a hospital follow up and for loop recorder wound check. She reports that she had been feeling well since coming home from the hospital. She has noticed that her stomach has been a little upset since starting Magnesium at home and she stopped this medication yesterday. Today her stomach feels better. We discussed possibility of bleeding with taking aspirin and what to watch for at home.     Past Medical History:   Diagnosis Date   • Atopic keratoconjunctivitis    • Corneal ulcer of both eyes    • Femur fracture (CMS/HCC)    • Hypothyroidism    • S/P knee replacement 2012   • Shoulder fracture, left        Past Surgical History:   Procedure Laterality Date   • ADENOIDECTOMY     • CATARACT EXTRACTION     • JOINT REPLACEMENT     • KERATOPLASTY     • TONSILLECTOMY         Social History     Tobacco Use   • Smoking status: Never Smoker   • Smokeless tobacco: Never Used   Substance Use Topics   • Alcohol use: Yes     Comment: social   • Drug use: No       Family History   Problem Relation Age of Onset   • Diabetes Biological Brother    • Lung cancer Biological Brother        Allergies:  Codeine, Gentamicin, and Gentamicin sulfate    Current Outpatient Medications   Medication Sig Dispense Refill   •  artificial tears, dextran-hpm-glyc, (GENTEAL TEARS) 0.1-0.3-0.2 % drops eye drops Administer 1 drop into both eyes 3 (three) times a day as needed (dry eyes).     • aspirin 81 mg enteric coated tablet Take 1 tablet (81 mg total) by mouth daily. 30 tablet 0   • bacitracin-polymyxin B (POLYSPORIN) ophthalmic ointment Apply 1 application to both eyes nightly.     • betamethasone valerate (VALISONE) 0.1 % cream Apply 1 application topically as needed for irritation (eczema).     • calcium carbonate-vitamin D3 600 mg-10 mcg (400 unit) capsule Take 1 tablet by mouth 2 (two) times a day.     • fluorometholone (FML) 0.1 % ophthalmic suspension Administer 1 drop into the left eye 2 (two) times a day.     • ibandronate (BONIVA) 150 mg tablet TAKE 1 TABLET EVERY 30 DAYS. TAKE IN THE MORNING WITH GLASS OF WATER PRIOR TO FOOD, DO NOT LIE DOWN FOR 30 MINUTES. (Patient taking differently: Take 150 mg by mouth every 30 (thirty) days.) 3 tablet 2   • levothyroxine (SYNTHROID) 75 mcg tablet Take 75 mcg by mouth daily.     • raloxifene (EVISTA) 60 mg tablet Take 60 mg by mouth daily.     • rosuvastatin (CRESTOR) 20 mg tablet Take 1 tablet (20 mg total) by mouth daily. 90 tablet 1   • valACYclovir (VALTREX) 1 gram tablet Take 1,000 mg by mouth daily. To prevent herpes infection in her eyes   Because she is on fml eye drops     • peg 400-propylene glycol (SYSTANE ULTRA) 0.4-0.3 % drops Administer 1 drop into both eyes 3 (three) times a day as needed.       No current facility-administered medications for this visit.       Review of Systems   Constitutional: Negative for fever.   Cardiovascular: Negative for chest pain, dyspnea on exertion, irregular heartbeat and palpitations.   Neurological: Negative for dizziness and light-headedness.       Objective     Vitals:    04/08/22 0942   BP: 124/70   Pulse: 89   SpO2: 97%       Wt Readings from Last 3 Encounters:   04/08/22 61.7 kg (136 lb)   03/24/22 61.2 kg (135 lb)   03/03/22 63.6 kg (140  lb 3.2 oz)       Body mass index is 24.87 kg/m².    Physical Exam  Vitals reviewed.   Cardiovascular:      Rate and Rhythm: Normal rate and regular rhythm.      Pulses: Normal pulses.           Posterior tibial pulses are 2+ on the right side and 2+ on the left side.      Heart sounds: Normal heart sounds.   Pulmonary:      Effort: Pulmonary effort is normal.   Musculoskeletal:      Cervical back: Normal range of motion.   Skin:     General: Skin is warm.      Capillary Refill: Capillary refill takes less than 2 seconds.   Neurological:      Mental Status: She is alert and oriented to person, place, and time.         ECG   Sinus  Rhythm   Low voltage in precordial leads.    -Poor R-wave progression -may be secondary to pulmonary disease   consider old anterior infarct.     Hematology  Lab Results   Component Value Date    WBC 6.69 03/29/2022    HGB 14.7 03/29/2022    HCT 45.5 (H) 03/29/2022     03/29/2022    INR 1.0 03/23/2022       Chemistries  Lab Results   Component Value Date     03/29/2022    K 4.1 03/29/2022     03/29/2022    CREATININE 0.9 03/29/2022    BUN 21 (H) 03/29/2022    CO2 23 03/29/2022    GLUCOSE 101 (H) 03/29/2022    CALCIUM 9.7 03/29/2022    ALT 17 03/29/2022    AST 30 03/29/2022       Cholesterol  Lab Results   Component Value Date    CHOL 186 03/24/2022    TRIG 57 03/24/2022    HDL 59 03/24/2022    LDLCALC 116 (H) 03/24/2022    NONHDLCALC 127 03/24/2022       Endocrine  Lab Results   Component Value Date    TSH 1.91 02/24/2022       Cardiac Imaging    TRANSTHORACIC ECHO (TTE) COMPLETE 03/24/2022    Interpretation Summary  · Normal-sized LV. Preserved LV systolic function. Estimated EF 65%. No regional wall motion abnormalities. Normal LV wall thickness. Normal diastolic filling pattern for age of the LV.  · Normal-sized RV. Normal RV systolic function.  · Normal-sized LA. Normal-sized RA.  · Tricuspid aortic valve. Sclerotic aortic valve leaflets. Mild to moderate aortic valve  regurgitation. No aortic valve stenosis. Aortic root normal. Ascending aorta normal-sized.  · Mild mitral annular calcification. Mild mitral valve regurgitation. No significant mitral valve stenosis.  · Mild tricuspid valve regurgitation. Estimated RVSP = 26 mmHg.  · Pulmonic valve not well visualized.  · Normal-sized IVC. IVC demonstrates normal respiratory collapse.  · No evidence of pericardial effusion.      Assessment/Plan     Acute CVA (cerebrovascular accident) (CMS/HCC)  MRI brain: small acute ischemia in the right centrum semioval extending to the right precentral gyrus subcortical white matter and punctate focus of acute ischemia along the left postcentral gyrus cortex, concerning for an embolic event    -Continue ASA 81 mg daily  -Continue statin therapy. Will switch to crestor 20 mg daily  -S/p loop recorder placement 3/25/22  -Follow with Neurology as well    Her first loop recorder report was available to review today. No afib on initial monitoring. nsSVT for 6 seconds    She plans to go on vacation for 10 days. We discussed option to take the monitor or leave it home. Small risk of afib developing while on vacation and not being detected until she returns home- for better protection she can take the monitor with her.    Nonrheumatic mitral valve regurgitation  Mild  Plan TTE f/u    Abnormal ECG  Suspect lead placement as etiology of abnormal reading vs normal variant  TTE did not correlate any abnormality. Asymptomatic    In the absence of symptoms, with reassuring TTE, and given this is a non-specific finding; no further workup indicated at this time        New Medications Ordered This Visit   Medications   • rosuvastatin (CRESTOR) 20 mg tablet     Sig: Take 1 tablet (20 mg total) by mouth daily.     Dispense:  90 tablet     Refill:  1       Medications Discontinued During This Encounter   Medication Reason   • atorvastatin (LIPITOR) 40 mg tablet        Orders Placed This Encounter   Procedures   •  ECG 12 lead     Scheduling Instructions:      PLEASE USE THIS ORDER FOR ECG'S PERFORMED IN PHYSICIAN OFFICES     Order Specific Question:   Release to patient     Answer:   Immediate            I, Yara Moise, am scribing for, and in the presence of, Xavier Barry MD.     Xavier COON MD, personally performed the services described in this documentation as scribed by Yara Moise in my presence, and it is both accurate and complete.     Xavier Barry MD  4/8/2022

## 2022-04-08 ENCOUNTER — OFFICE VISIT (OUTPATIENT)
Dept: CARDIOLOGY | Facility: CLINIC | Age: 87
End: 2022-04-08
Payer: MEDICARE

## 2022-04-08 VITALS
HEART RATE: 89 BPM | OXYGEN SATURATION: 97 % | BODY MASS INDEX: 25.03 KG/M2 | SYSTOLIC BLOOD PRESSURE: 124 MMHG | DIASTOLIC BLOOD PRESSURE: 70 MMHG | WEIGHT: 136 LBS | HEIGHT: 62 IN

## 2022-04-08 DIAGNOSIS — I34.0 NONRHEUMATIC MITRAL VALVE REGURGITATION: Primary | ICD-10-CM

## 2022-04-08 PROBLEM — R94.31 ABNORMAL ECG: Status: ACTIVE | Noted: 2022-04-08

## 2022-04-08 PROCEDURE — 99214 OFFICE O/P EST MOD 30 MIN: CPT | Performed by: INTERNAL MEDICINE

## 2022-04-08 PROCEDURE — 93000 ELECTROCARDIOGRAM COMPLETE: CPT | Performed by: INTERNAL MEDICINE

## 2022-04-08 RX ORDER — ROSUVASTATIN CALCIUM 20 MG/1
20 TABLET, COATED ORAL DAILY
Qty: 90 TABLET | Refills: 1 | Status: SHIPPED | OUTPATIENT
Start: 2022-04-08 | End: 2022-10-10

## 2022-04-08 ASSESSMENT — ENCOUNTER SYMPTOMS
DIZZINESS: 0
DYSPNEA ON EXERTION: 0
LIGHT-HEADEDNESS: 0
PALPITATIONS: 0
FEVER: 0
IRREGULAR HEARTBEAT: 0

## 2022-04-08 NOTE — ASSESSMENT & PLAN NOTE
MRI brain: small acute ischemia in the right centrum semioval extending to the right precentral gyrus subcortical white matter and punctate focus of acute ischemia along the left postcentral gyrus cortex, concerning for an embolic event    -Continue ASA 81 mg daily  -Continue statin therapy. Will switch to crestor 20 mg daily  -S/p loop recorder placement 3/25/22  -Follow with Neurology as well    Her first loop recorder report was available to review today. No afib on initial monitoring. nsSVT for 6 seconds    She plans to go on vacation for 10 days. We discussed option to take the monitor or leave it home. Small risk of afib developing while on vacation and not being detected until she returns home- for better protection she can take the monitor with her.

## 2022-04-08 NOTE — ASSESSMENT & PLAN NOTE
Suspect lead placement as etiology of abnormal reading vs normal variant  TTE did not correlate any abnormality. Asymptomatic    In the absence of symptoms, with reassuring TTE, and given this is a non-specific finding; no further workup indicated at this time

## 2022-04-28 RX ORDER — LEVOTHYROXINE SODIUM 75 UG/1
TABLET ORAL
Qty: 90 TABLET | Refills: 0 | Status: SHIPPED | OUTPATIENT
Start: 2022-04-28 | End: 2022-07-26

## 2022-05-06 ENCOUNTER — HOSPITAL ENCOUNTER (OUTPATIENT)
Dept: RADIOLOGY | Facility: HOSPITAL | Age: 87
Discharge: HOME | End: 2022-05-06
Attending: INTERNAL MEDICINE
Payer: MEDICARE

## 2022-05-06 DIAGNOSIS — Z12.31 SCREENING MAMMOGRAM FOR BREAST CANCER: ICD-10-CM

## 2022-05-06 PROCEDURE — 77063 BREAST TOMOSYNTHESIS BI: CPT

## 2022-05-06 PROCEDURE — 77067 SCR MAMMO BI INCL CAD: CPT

## 2022-05-16 ENCOUNTER — OFFICE VISIT (OUTPATIENT)
Dept: NEUROLOGY | Facility: CLINIC | Age: 87
End: 2022-05-16
Payer: MEDICARE

## 2022-05-16 VITALS
BODY MASS INDEX: 25.21 KG/M2 | WEIGHT: 137 LBS | OXYGEN SATURATION: 95 % | SYSTOLIC BLOOD PRESSURE: 120 MMHG | DIASTOLIC BLOOD PRESSURE: 74 MMHG | HEART RATE: 89 BPM | HEIGHT: 62 IN

## 2022-05-16 DIAGNOSIS — I63.411 CEREBRAL INFARCTION DUE TO EMBOLISM OF RIGHT MIDDLE CEREBRAL ARTERY (CMS/HCC): Primary | ICD-10-CM

## 2022-05-16 DIAGNOSIS — I63.9 CRYPTOGENIC STROKE (CMS/HCC): ICD-10-CM

## 2022-05-16 PROCEDURE — 99215 OFFICE O/P EST HI 40 MIN: CPT | Performed by: PSYCHIATRY & NEUROLOGY

## 2022-05-16 NOTE — PROGRESS NOTES
"                                             Neurology New Patient Clinic Evaluation     Patient Name: Catia Ward                                                                   : 1932   MRN: [id number]                                            Visit Date: 2022   Encounter Provider: Enid Erwin M.D.  Referring Provider: Porsha Short DO      Chief Complaint: Stroke with Left leg weakness    History of Presenting Illness:  The patient is a 89 y.o. 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 The patient presents to the Neurology Clinic for further Cerebrovascular disease evaluation and Post-Stroke management.    --- History is obtained from the patient. In addition, any available clinical notes from recent hospitalization (3/23 thru 3/25) are reviewed in detail. All pertinent findings and results have been summarized below.    --- Summary of recent hospitalization: The patient initially presented to [facility] on 3/23/2022 with new, acute onset of Left leg weakness and sensory changes, described by the patient as her Left leg \"feeling heavy\" with a \"wooden feeling\", with mild weakness when weight bearing on that side. The patient initially experienced the symptoms upon waking up that morning, and hence was outside tPA window at the time of her ER arrival. She was admitted for further evaluation, including MRI testing, which confirmed the presence of small acute ischemic infarction in the Right precentral gyrus + punctate acute ischemic " "infarct in the Left post-central gyrus, concerning for an Embolic etiology. Vessel Imaging with MRA Head/Neck were unremarkable. The patient underwent Transthoracic Echo, and was monitored on Telemetry, neither of which revealed any clear sources of embolism. The patient underwent Loop monitor placement on 3/25/22 for long term cardiac rhythm monitoring. The patient was started on Aspirin 81 mg daily + Atorvastatin 40 mg daily for secondary Stroke prevention.    --- Interval History since discharge: The patient reports marked improvement in her overall neurologic condition since her hospitalization, stating that she is \"doing fine\". She reports complete resolution of her Stroke related symptoms in her Left leg, including resolution of the \"wooden feeling\". She denies any residual weakness in the Left leg. She reports her ambulation to be similar to her baseline as well - noting that she uses a Rolling Walker for longer distances to support her back due to the chronic T11 compression fracture related pain/symptoms. She denies any changes in her chronic back pain or associated symptoms either.  The patient attributes the improvement to Physical therapy, which she recently concluded.     --- The patient has no new or worsening neurologic complaints at all. She denies any concerns for recurrent Stroke/TIA/ICH.     --- The patient confirms good compliance with all of her prescribed medications, including Aspirin 81 mg daily + Atorvastatin 40 mg daily, which were both initiated for secondary Stroke prevention. She denies any medication related adverse effects or other concerns.     Neurologic Review of Systems:  --- There is no headache, changes in vision, changes in hearing, changes in speech, changes in peripheral sensation, changes in strength, changes in gait or balance, changes in bowel/bladder control, nausea/vomiting, photophobia, phonophobia, dizziness, lightheadedness or vertigo recently.    Review of " Systems:  General ROS: negative for - chills, fever, night sweats, except as noted in HPI  Ophthalmic ROS: negative for - blurry vision, double vision, except as noted in HPI  ENT ROS: negative for - hearing change, tinnitus, vertigo, except as noted in HPI  Hematological and Lymphatic ROS: negative for - bleeding problems, bruising, or weight loss, except as noted in HPI  Endocrine ROS: negative for - hot flashes, temperature intolerance, unexpected weight changes, except as noted in HPI  Respiratory ROS: negative for - cough or shortness of breath, except as noted in HPI  Cardiovascular ROS: negative for - chest pain, dyspnea on exertion, or palpitations, except as noted in HPI  Gastrointestinal ROS: negative for - abdominal pain, constipation, or diarrhea, except as noted in HPI  Genito-Urinary ROS: negative for - dysuria or incontinence, except as noted in HPI  Musculoskeletal ROS: negative for - joint pain or joint swelling, except as noted in HPI  Psychological ROS: negative for - hallucinations, suicidal ideation, except as noted in HPI  Neurological ROS: As noted in HPI above.      Medications:  Current Outpatient Medications:   •  artificial tears, dextran-hpm-glyc, (GENTEAL TEARS) 0.1-0.3-0.2 % drops eye drops, Administer 1 drop into both eyes 3 (three) times a day as needed (dry eyes)., Disp: , Rfl:   •  aspirin 81 mg enteric coated tablet, Take 1 tablet (81 mg total) by mouth daily., Disp: 30 tablet, Rfl: 0  •  bacitracin-polymyxin B (POLYSPORIN) ophthalmic ointment, Apply 1 application to both eyes nightly., Disp: , Rfl:   •  betamethasone valerate (VALISONE) 0.1 % cream, Apply 1 application topically as needed for irritation (eczema)., Disp: , Rfl:   •  calcium carbonate-vitamin D3 600 mg-10 mcg (400 unit) capsule, Take 1 tablet by mouth 2 (two) times a day., Disp: , Rfl:   •  fluorometholone (FML) 0.1 % ophthalmic suspension, Administer 1 drop into the left eye 2 (two) times a day., Disp: , Rfl:   •   ibandronate (BONIVA) 150 mg tablet, TAKE 1 TABLET EVERY 30 DAYS. TAKE IN THE MORNING WITH GLASS OF WATER PRIOR TO FOOD, DO NOT LIE DOWN FOR 30 MINUTES. (Patient taking differently: Take 150 mg by mouth every 30 (thirty) days.), Disp: 3 tablet, Rfl: 2  •  levothyroxine (SYNTHROID) 75 mcg tablet, TAKE ONE TABLET BY MOUTH ONCE DAILY, Disp: 90 tablet, Rfl: 0  •  raloxifene (EVISTA) 60 mg tablet, Take 60 mg by mouth daily., Disp: , Rfl:   •  rosuvastatin (CRESTOR) 20 mg tablet, Take 1 tablet (20 mg total) by mouth daily., Disp: 90 tablet, Rfl: 1  •  valACYclovir (VALTREX) 1 gram tablet, Take 1,000 mg by mouth daily. To prevent herpes infection in her eyes  Because she is on fml eye drops, Disp: , Rfl:   •  peg 400-propylene glycol (SYSTANE ULTRA) 0.4-0.3 % drops, Administer 1 drop into both eyes 3 (three) times a day as needed., Disp: , Rfl:      Past Medical History: The patient  has a past medical history of Atopic keratoconjunctivitis, Corneal ulcer of both eyes, Femur fracture (CMS/[facility]), Hypothyroidism, S/P knee replacement (2012), and Shoulder fracture, left.    Past Surgical History: The patient  has a past surgical history that includes Joint replacement; Tonsillectomy; Adenoidectomy; Cataract extraction; and Keratoplasty.    Family History: The patient's family history includes Diabetes in her biological brother; Lung cancer in her biological brother.    Social History:   Social History     Tobacco Use   • Smoking status: Never Smoker   • Smokeless tobacco: Never Used   Substance Use Topics   • Alcohol use: Yes     Comment: social   • Drug use: No       Allergies: The patient is allergic to codeine, gentamicin, and gentamicin sulfate.    Physical Examination:  Vitals:    05/16/22 0853   BP: 120/74   Pulse: 89   SpO2: 95%     General Appearance: Elderly, short Female, with age appropriate appearance, presenting to the Clinic alone in good physical condition, without any acute clinical distress  Cardiac: RRR,  slight peripheral edema in both legs  Skin: Warm    Neurologic Exam:  Mental Status:  - Level of Consciousness: Well Awake  - Alertness: Alert in response to voice with appropriately timed responses  - Attention: Intact - Able to spell RADAH backwards, Able to recite months of the year backwards  - Orientation: Intact to person/self; Intact to time (date/day/season/month/year); Intact to location in detail (including floor of the building, name of the building, town, county and state); Intact to situation appropriately  - Neglect - No unilateral visual or spatial neglect noted on exam  - Mood/Affect - Mildly flattened/dysthymic appearing affect  - Behavior/Cooperation - Slightly irritable at times, slightly argumentative at times, but cooperative with exam after counseling/reassurance    - Language: Speech is fluent with normal prosody  1. Naming - Names all simple objects to confrontation. Able to name parts of objects to confrontation.  2. Comprehension - Able to follow simple commands well; Able to follow complex 3 step commands crossing Right-Left without any difficulties  3. Repetition - Able to repeat full sentences without difficulty  - Calculations: Able to perform simple calculations - Able to calculate quarters in $1.75 correctly  - No Ideomotor Apraxia  - No Right Left confusion  - No Finger Agnosia    Cranial Nerves:  CN II: Visual Acuity: Grossly intact to finger counting OU; Visual Fields: Intact to confrontation in all quadrants bilaterally without any neglect on double simultaneous stimulation; Pupils are mid-sized, largely equal but post-surgical, and poorly reactive to light bilaterally; Fundus exam: Somewhat clouded appearance of the Pupil and Cornea, with inability to visualize optic discs  CN III/IV/VI: Extraocular movements are intact bilaterally in all directions without any nystagmus; Normal smooth pursuit; Slight Left eye ptosis is noted  CN V: Normal facial sensation bilaterally; Good bite  strength bilaterally  CN VII: Normal symmetric facial movement at rest, with smiling and with speech in both upper and lower facial distributions  CN VIII: Hearing mildly reduced on both sides  CN IX/X: Symmetric palate elevation, No uvular deviation, No dysarthria noted, Gag/Cough intact  CN XI: Normal/strong head turning bilaterally; Normal 5/5 shoulder shrug bilaterally  CN XII: Normal/midline tongue protrusion    Motor:  Bulk: No focal atrophy noted on visual inspection of the extremities  Tone: Normal tone without any rigidity, spasticity or hypotonia noted on exam  Bradykinesia: No decrementing bradykinesia on finger tapping bilaterally  Abnormal movements: Trace bilateral hands tremor is noted with sustained posture and action/intention. No resting tremors or other abnormal movements noted    Strength:  Action/Muscle  Shoulder abduction/Deltoids - 5/5 bilaterally  Elbow Flexion/Biceps - 5/5 bilaterally  Elbow Extension/Triceps - 5/5 bilaterally  Wrist Flexion - 5/5 bilaterally  Wrist Extension - 5/5 bilaterally   strength/Interosseous - 5/5 bilaterally    Hip Flexion/Iliopsoas - 5 Right, 5- Left   Hip Abduction - 5/5 bilaterally  Hip Adduction - 5 Right, 5- Left  Knee Flexion/Hamstrings - 5/5 bilaterally  Knee Extension/Quadriceps - 5 Right, 5- Left  Ankle Dorsiflexion/Tibialis Ant - 5/5 bilaterally  Ankle Planter flexion/Gastrocn - 5/5 bilaterally    Sensory:  Sensation intact to light touch, pinprick in both upper and lower extremities bilaterally  Negative Romberg    Reflexes:  Reflex Name:                Right    Left  Biceps                              2+       2+  Brachioradialis                 2+       2+  Knee Jerk                        2+       2+  Ankle Jerk                       1+       1+  Babinski                     Absent   Absent  Clonus                         None     None    Coordination:  No truncal ataxia  No dysmetria on Finger to nose bilaterally    Gait:  No significant ataxia  or imbalance is noted. Normal stride length. Good pace. Normal pivot turning.            Data/Results:  Imaging: I have personally reviewed the images for the NeuroImaging studies listed below. My interpretation is noted as following:     MRI Brain without contrast: 3/24/2022  - Small acute ischemic infarction is noted in the Right pre-central gyrus, somewhat medially. Additional, punctate focus of acute to subacute ischemia is noted in the cortical margin of the Left post central gyrus laterally.   - Severe/Advanced chronic white matter changes are noted bilaterally. Diffuse cortical atrophy is noted, largely expected for age. No microhemorrhages.    MRA Head/Neck without contrast: 3/24/2022  - Within limitations of MRA resolution, motion artifact and lack of contrast use, the MRA images do not reveal any clear evidence of hemodynamically significant stenosis or occlusion in intracranial or extracranial large vessels supplying the brain.      Pertinent Lab Results:  Lab Results   Component Value Date    WBC 6.69 03/29/2022    HGB 14.7 03/29/2022    HCT 45.5 (H) 03/29/2022    .7 (H) 03/29/2022     03/29/2022     Lab Results   Component Value Date    CREATININE 0.9 03/29/2022    BUN 21 (H) 03/29/2022     03/29/2022    K 4.1 03/29/2022     03/29/2022    CO2 23 03/29/2022     Lab Results   Component Value Date    ALT 17 03/29/2022    AST 30 03/29/2022    ALKPHOS 70 03/29/2022    BILITOT 0.9 03/29/2022     Lab Results   Component Value Date    CHOL 186 03/24/2022     Lab Results   Component Value Date    HDL 59 03/24/2022     Lab Results   Component Value Date    LDLCALC 116 (H) 03/24/2022     Lab Results   Component Value Date    TRIG 57 03/24/2022       Lab Requisition on 03/29/2022   Component Date Value   • CRP 03/29/2022 <6.00    • WBC 03/29/2022 6.69    • RBC 03/29/2022 4.43    • Hemoglobin 03/29/2022 14.7    • Hematocrit 03/29/2022 45.5 (A)   • MCV 03/29/2022 102.7 (A)   • MCH 03/29/2022  33.2    • MCHC 03/29/2022 32.3    • RDW 03/29/2022 12.6    • Platelets 03/29/2022 185    • MPV 03/29/2022 12.2    • Differential Type 03/29/2022 Auto    • nRBC 03/29/2022 0.0    • Immature Granulocytes 03/29/2022 0.3    • Neutrophils 03/29/2022 43.1    • Lymphocytes 03/29/2022 44.1    • Monocytes 03/29/2022 9.0    • Eosinophils 03/29/2022 2.8    • Basophils 03/29/2022 0.7    • Immature Granulocytes, A* 03/29/2022 0.02    • Neutrophils, Absolute 03/29/2022 2.88    • Lymphocytes, Absolute 03/29/2022 2.95    • Monocytes, Absolute 03/29/2022 0.60    • Eosinophils, Absolute 03/29/2022 0.19    • Basophils, Absolute 03/29/2022 0.05    • Sodium 03/29/2022 138    • Potassium 03/29/2022 4.1    • Chloride 03/29/2022 105    • CO2 03/29/2022 23    • BUN 03/29/2022 21 (A)   • Creatinine 03/29/2022 0.9    • Glucose 03/29/2022 101 (A)   • Calcium 03/29/2022 9.7    • AST (SGOT) 03/29/2022 30    • ALT (SGPT) 03/29/2022 17    • Alkaline Phosphatase 03/29/2022 70    • Total Protein 03/29/2022 5.6 (A)   • Albumin 03/29/2022 3.6    • Bilirubin, Total 03/29/2022 0.9    • eGFR 03/29/2022 59.1 (A)   • Anion Gap 03/29/2022 10    • Sed Rate 03/29/2022 <3    Admission on 03/23/2022, Discharged on 03/25/2022   Component Date Value   • WBC 03/23/2022 5.99    • RBC 03/23/2022 4.90    • Hemoglobin 03/23/2022 16.6 (A)   • Hematocrit 03/23/2022 48.7 (A)   • MCV 03/23/2022 99.4 (A)   • MCH 03/23/2022 33.9 (A)   • MCHC 03/23/2022 34.1    • RDW 03/23/2022 12.2    • Platelets 03/23/2022 179    • MPV 03/23/2022 10.9    • Differential Type 03/23/2022 Auto    • nRBC 03/23/2022 0.0    • Immature Granulocytes 03/23/2022 0.3    • Neutrophils 03/23/2022 59.7    • Lymphocytes 03/23/2022 29.9    • Monocytes 03/23/2022 6.7    • Eosinophils 03/23/2022 2.2    • Basophils 03/23/2022 1.2    • Immature Granulocytes, A* 03/23/2022 0.02    • Neutrophils, Absolute 03/23/2022 3.58    • Lymphocytes, Absolute 03/23/2022 1.79    • Monocytes, Absolute 03/23/2022 0.40    •  Eosinophils, Absolute 03/23/2022 0.13    • Basophils, Absolute 03/23/2022 0.07    • Sodium 03/23/2022 141    • Potassium 03/23/2022 3.7    • Chloride 03/23/2022 104    • CO2 03/23/2022 25    • BUN 03/23/2022 17    • Creatinine 03/23/2022 0.8    • Glucose 03/23/2022 150 (A)   • Calcium 03/23/2022 9.7    • AST (SGOT) 03/23/2022 34    • ALT (SGPT) 03/23/2022 18    • Alkaline Phosphatase 03/23/2022 64    • Total Protein 03/23/2022 7.0    • Albumin 03/23/2022 4.4    • Bilirubin, Total 03/23/2022 1.0    • eGFR 03/23/2022 >60.0    • Anion Gap 03/23/2022 12    • High Sens Troponin I 03/23/2022 4.4    • PT 03/23/2022 12.8    • INR 03/23/2022 1.0    • PTT 03/23/2022 27    • Ventricular rate 03/23/2022 86    • Atrial rate 03/23/2022 86    • DE Interval 03/23/2022 158    • QRS duration 03/23/2022 84    • QT Interval 03/23/2022 400    • QTC Calculation(Bazett) 03/23/2022 478    • P Axis 03/23/2022 49    • R Axis 03/23/2022 -23    • T Wave Axis 03/23/2022 33    • Color, Urine 03/23/2022 Colorless    • Clarity, Urine 03/23/2022 Clear    • Specific Gravity, Urine 03/23/2022 1.007    • pH, Urine 03/23/2022 6.5    • Leukocyte Esterase 03/23/2022 Negative    • Nitrite, Urine 03/23/2022 Negative    • Protein, Urine 03/23/2022 Negative    • Glucose, Urine 03/23/2022 Negative    • Ketones, Urine 03/23/2022 Negative    • Urobilinogen, Urine 03/23/2022 0.2    • Bilirubin, Urine 03/23/2022 Negative    • Blood, Urine 03/23/2022 Negative    • High Sens Troponin I 03/23/2022 4.4    • SARS-CoV-2 (COVID-19) 03/23/2022 Negative    • Sodium 03/24/2022 141    • Potassium 03/24/2022 3.9    • Chloride 03/24/2022 105    • CO2 03/24/2022 24    • BUN 03/24/2022 17    • Creatinine 03/24/2022 0.8    • Glucose 03/24/2022 87    • Calcium 03/24/2022 9.1    • eGFR 03/24/2022 >60.0    • Anion Gap 03/24/2022 12    • Triglycerides 03/24/2022 57    • Cholesterol 03/24/2022 186    • HDL 03/24/2022 59    • LDL Calculated 03/24/2022 116 (A)   • Non-HDL, Calculated  03/24/2022 127    • RISK 03/24/2022 3.2    • BSA 03/24/2022 1.64    • LVLd A2C 03/24/2022 6.81    • LVLd A4C 03/24/2022 6.81    • LVAd A2C 03/24/2022 20.70    • LVAd A4C 03/24/2022 19.20    • LV EDV (apical 2 chamber 03/24/2022 52.80    • LV Diastolic Volume 03/24/2022 43.40    • End Diastolic Volume Bip* 03/24/2022 47.30    • LVLs A2C 03/24/2022 5.24    • LVLs A4C 03/24/2022 5.18    • LVAs A2C 03/24/2022 7.84    • LVAs A4C 03/24/2022 8.49    • LV ESV (Apical 2 Chamber) 03/24/2022 10.10    • LV Systolic Volume 03/24/2022 12.50    • End Systolic Volume Bipl* 03/24/2022 11.10    • IVS 03/24/2022 0.74    • LVIDd 03/24/2022 3.61    • LVIDs 03/24/2022 2.02    • LVOT Mean Gradient 03/24/2022 4.00    • LVOT VTI 03/24/2022 23.30    • LVOT Mean Velocity  03/24/2022 0.94    • LVOT Peak Velocity 03/24/2022 1.46    • PW 03/24/2022 0.88    • LVPWd 03/24/2022 0.88    • MV E' Tissue Velocity La* 03/24/2022 0.12    • MV E' Tissue Velocity Me* 03/24/2022 0.08    • Ejection Fraction Apical* 03/24/2022 80.90    • Ejection Fraction (A4C) 03/24/2022 71.20    • EF 03/24/2022 76.50    • Tissue doppler E/ Latera* 03/24/2022 4.50    • E/E' ratio 03/24/2022 6.50    • FS 03/24/2022 44.00    • [place] A4C  03/24/2022 5.19    • LALD A4C 03/24/2022 4.34    • LAAs A2C 03/24/2022 15.50    • LAAs A4C 03/24/2022 11.60    • Left Atrium Systolic Vol* 03/24/2022 37.60    • LA volume 03/24/2022 23.10    • Left Atrium End Systolic* 03/24/2022 32.10    • LAD 2D 03/24/2022 3.30    • LYLE P1/2t 03/24/2022 374.00    • AVR Peak Ferny 03/24/2022 3.91    • AV Peak Velocity-S 03/24/2022 1.64    • AV Peak Gradient 03/24/2022 11.00    • AV Mean Gradient 03/24/2022 6.00    • AV VTI 03/24/2022 29.80    • AV Mean Velocity 03/24/2022 1.09    • Aortic Root Annulus (M-m* 03/24/2022 2.70    • Ascend Ao 03/24/2022 2.80    • E wave deceleration time 03/24/2022 246.00    • MV Peak A-Wave 03/24/2022 0.86    • MV Peak E-Wave 03/24/2022 0.52    • MV Mean Gradient 03/24/2022 2.00    •  MV VTI 03/24/2022 22.80    • MV Peak Gradient 03/24/2022 4.00    • E/A ratio 03/24/2022 0.60    • PV Peak D Velocity 03/24/2022 0.28    • PV Peak S Ferny 03/24/2022 0.62    • Pulmonary Vein S/D ratio 03/24/2022 2.20    • TR Peak Velocity 03/24/2022 2.42    • TR Peak Gradient 03/24/2022 23.00    • LV Systolic Volume Index 03/24/2022 7.62    • LV Diastolic Volume Index 03/24/2022 26.46    • LA Volume Index 03/24/2022 14.09    • LV Systolic Volume Index* 03/24/2022 6.16    • LV Systolic Volume  Inde* 03/24/2022 6.77    • LV Diastolic Volume Inde* 03/24/2022 32.20    • LV Diastolic Volume Inde* 03/24/2022 28.84    • Left Atrial End Systolic* 03/24/2022 22.93    • LA ESV INDEX (BP) 03/24/2022 19.57    • ZLVIDD 03/24/2022 -2.39    • ZLVIDS 03/24/2022 -2.70    • ZLVPWD 03/24/2022 1.30    • Est RVSP TR JET 03/24/2022 26    • WBC 03/25/2022 7.85    • RBC 03/25/2022 4.44    • Hemoglobin 03/25/2022 14.8    • Hematocrit 03/25/2022 44.3    • MCV 03/25/2022 99.8 (A)   • MCH 03/25/2022 33.3 (A)   • MCHC 03/25/2022 33.4    • RDW 03/25/2022 12.2    • Platelets 03/25/2022 179    • MPV 03/25/2022 11.0    • Sodium 03/25/2022 139    • Potassium 03/25/2022 3.9    • Chloride 03/25/2022 105    • CO2 03/25/2022 24    • BUN 03/25/2022 18    • Creatinine 03/25/2022 0.8    • Glucose 03/25/2022 100 (A)   • Calcium 03/25/2022 9.0    • eGFR 03/25/2022 >60.0    • Anion Gap 03/25/2022 10          Assessment/Discussion:  1. Acute Cerebral Infarction, fortunately with very small/limited infarct burden, affecting bilateral MCA distributions. This represents a Cryptogenic Stroke (Embolic Stroke of Unknown Source).    --- Clinically, the patient has recovered exceptionally well from her Stroke, without any significant residual Stroke related deficits noted on the current examination.       Recommendations/Plan:  1. Will recommend to continue Aspirin 81 mg daily for long term secondary Stroke prevention.  2. Will recommend to continue Atorvastatin 40 mg  daily for secondary Stroke prevention, while monitoring for any adverse effects.   3. Will recommend close follow up with Cardiology for Cardiac rhythm monitoring/Loop monitor checks. Will recommend transitioning the patient to appropriate anticoagulation therapy, after an appropriate risk/benefit discussion, if paroxysmal A Fib is confirmed on long term monitoring.   4. Will recommend close follow up with Primary Care for appropriate Vascular risk factor management, including recommended goal BP < 140/80, Hgb A1c < 6.5 and LDL < 70.  5. Will recommend Primary Care to check/monitor the patient's Hgb A1c, B12/Folate, TSH and Lipid Panel periodically. Will recommend appropriate nutritional supplementation to maintain goal B12 > 500 and Folate > 10.    The patient has been counseled extensively on signs/symptoms of Acute Stroke/TIA/ICH. She has been counseled on lifestyle modifications for stroke prevention in the future. She has been counseled on medication related risks and benefits as well.     The patient will return to Neurology Clinic in 1 year, or sooner if clinically indicated.    I spent > 40 minutes face to face with the patient in this 70 min long total encounter. Counseling is provided on the current diagnosis and management plan including workup and treatment options and risks/benefits associated with these options. Further counseling is provided regarding usual expected course as well as preventive measures as applicable. All questions are answered.

## 2022-05-23 RX ORDER — RALOXIFENE HYDROCHLORIDE 60 MG/1
TABLET, FILM COATED ORAL
Qty: 90 TABLET | Refills: 0 | Status: SHIPPED | OUTPATIENT
Start: 2022-05-23 | End: 2022-09-26

## 2022-07-01 RX ORDER — IBANDRONATE SODIUM 150 MG/1
TABLET, FILM COATED ORAL
Qty: 3 TABLET | Refills: 2 | Status: SHIPPED | OUTPATIENT
Start: 2022-07-01 | End: 2023-05-02

## 2022-07-05 ENCOUNTER — OFFICE VISIT (OUTPATIENT)
Dept: INTERNAL MEDICINE | Facility: CLINIC | Age: 87
End: 2022-07-05
Payer: MEDICARE

## 2022-07-05 VITALS
HEART RATE: 63 BPM | DIASTOLIC BLOOD PRESSURE: 70 MMHG | OXYGEN SATURATION: 98 % | SYSTOLIC BLOOD PRESSURE: 110 MMHG | HEIGHT: 62 IN | WEIGHT: 137.2 LBS | BODY MASS INDEX: 25.25 KG/M2 | TEMPERATURE: 98.3 F

## 2022-07-05 DIAGNOSIS — Z00.00 MEDICARE ANNUAL WELLNESS VISIT, SUBSEQUENT: Primary | ICD-10-CM

## 2022-07-05 DIAGNOSIS — E03.9 HYPOTHYROIDISM, UNSPECIFIED TYPE: ICD-10-CM

## 2022-07-05 DIAGNOSIS — E78.5 DYSLIPIDEMIA: ICD-10-CM

## 2022-07-05 DIAGNOSIS — R73.9 HYPERGLYCEMIA: ICD-10-CM

## 2022-07-05 DIAGNOSIS — E53.8 VITAMIN B 12 DEFICIENCY: ICD-10-CM

## 2022-07-05 DIAGNOSIS — R29.898 TRANSIENT LEFT LEG WEAKNESS: ICD-10-CM

## 2022-07-05 DIAGNOSIS — I63.411 CEREBRAL INFARCTION DUE TO EMBOLISM OF RIGHT MIDDLE CEREBRAL ARTERY (CMS/HCC): ICD-10-CM

## 2022-07-05 PROCEDURE — G0439 PPPS, SUBSEQ VISIT: HCPCS | Performed by: INTERNAL MEDICINE

## 2022-07-05 ASSESSMENT — ENCOUNTER SYMPTOMS
DIARRHEA: 0
ABDOMINAL PAIN: 0
ABDOMINAL DISTENTION: 0
CONSTIPATION: 0
ARTHRALGIAS: 0
VOMITING: 0
PALPITATIONS: 0

## 2022-07-05 ASSESSMENT — PATIENT HEALTH QUESTIONNAIRE - PHQ9: SUM OF ALL RESPONSES TO PHQ9 QUESTIONS 1 & 2: 0

## 2022-07-05 ASSESSMENT — MINI COG: TOTAL SCORE: 5

## 2022-07-05 NOTE — PATIENT INSTRUCTIONS
Tyelnol  up to 3000 mg per day.    the regular tabs of 325 mg each.   Take two tabs up to three times a day.                                Your Personalized Prevention Plan Services (PPPS)    Preventive Services Checklist (Assumes Average Risk Unless Otherwise Noted):    Health Maintenance Topics with due status: Overdue       Topic Date Due    Zoster Vaccine 07/21/2017    Colon Cancer Screening: Annual FIT Never done    Medicare Annual Wellness Visit 04/09/2022     Health Maintenance Topics with due status: Not Due       Topic Last Completion Date    DTaP, Tdap, and Td Vaccines 03/08/2016    Influenza Vaccine 10/29/2019    DEXA Scan 03/30/2021    Breast Cancer Screening 05/06/2022     Health Maintenance Topics with due status: Completed       Topic Last Completion Date    Pneumococcal (65 years and older) 11/18/2015    Hepatitis C Screening 01/18/2022    COVID-19 Vaccine 04/25/2022    Annual Falls Risk Screening 07/05/2022     Health Maintenance Topics with due status: Aged Out       Topic Date Due    Meningococcal ACWY Aged Out    HIB Vaccines Aged Out    IPV Vaccines Aged Out    HPV Vaccines Aged Out       You May Be Eligible for These Additional Preventive Services   (Assumes Average Risk Unless Otherwise Noted)  Diabetes Screening Any 1 risk factor: hypertension, dyslipidemia, obesity, high glucose; or Any 2 risk factors: >=66yo, overweight, family history diabetes (covered every 6 months)   Hepatitis C Screening Any 1 risk factor: 1) blood transfusion before 1992,   2) current or past injection drug use (annually for high risk; if born between 3914-7[birth year], see above for status).   Vaccine: Hepatitis B As necessary if at-risk: hemophilia, ESRD, diabetes, living with individual infected with hep B, healthcare worker with frequent contact with blood/bodily fluids (series covered once)   Sexually Transmitted Diseases (STDs) As necessary chlamydia, gonorrhea, syphilis, hepatitis B (covered annually)  HIV if  any 1 risk factor present: 1) <16yo or >66yo and at increased risk or 2) 15-66yo and ask for it (covered annually)   Lung Cancer Screening Low dose chest CT if all three risk factors: 1) 50-78yo, 2) smoker or quit within last 15y, 3) >=20 pack years (covered annually).  No results found for this or any previous visit.       Cholesterol Screening Both risk factors: 1) >=19yo and 2)  increased risk coronary artery disease (covered every 5 years).     Breast Cancer Screening Covered once 35-40yo, annually >=39yo (if >=49yo, see above for status).         Health Risk Factors with Personalized Education:  ----------------------------------------------------------------------------------------------------------------------  Controlling Your Cholesterol  Reduce the amount of saturated and trans fat in your diet.  Limit intake of red meat.  Consume only low-fat or non-fat/skim dairy.  Limit fried food.  Cook with vegetable oils.  Reduce your intake of sugary foods, sugary drinks and alcohol.  Eat a diet high in fruit, vegetables and whole grains.  Get protein from fish, poultry and a small portion of nuts.  Stay active.  Try to get at least 90 to 150 minutes of exercise per week.  Try brisk walking, swimming, bicycling or dancing.  Maintain a healthy weight by balancing your diet and exercise.  If you have been prescribed medication, take it regularly and exactly as prescribed. Let your PCP know if you have any problems or questions about your medication.  It’s important to know your cholesterol numbers.  When recommended by your PCP, get the cholesterol blood test.  ---------------------------------------------------------------------------------------------------------------------   Controlling Your Osteoporosis, Strengthening Your Bones  Try to get at least 90 to 150 minutes of weight-bearing exercise per week.  Ensure intake of at least 1200mg of calcium per day.  Eat foods high in calcium like milk and other dairy,  green vegetables, fruit, canned fish with soft and edible bones, nuts, calcium-set tofu.  Some foods are calcium-fortified, like bread, cereal, fruit juices and mineral water.  Help your body make vitamin D by getting 10-15 minutes per day of sunlight.    Ensure intake of at least 600IU of vitamin D per day.  Eat foods high in vitamin D like oily fish (salmon, sardines, mackerel) and eggs.  Some foods are fortified with vitamin D, like dairy and cereals.  Avoid high amounts of caffeine and salt, since they can cause the body to loose calcium.  Limit alcohol intake, since it is associated with weaker bones and is associated with falls and fractures.  Limit intake of fizzy drinks.  If you have been prescribed medication, take it regularly and exactly as prescribed.  Let your PCP know if you have any problems or questions about your medication  ----------------------------------------------------------------------------------------------------------------------  Reducing Your Risk of Falls  Tell your PCP if any of your medications make you feel tired, dizzy, lightheaded or off-balance.  Maintain coordination, flexibility and balance by ensuring regular physical activity.  Limit alcohol intake to 1 drink per day.  Consider avoiding all alcohol intake.  Ensure good vision.  Visit an ophthalmologist or optometrist regularly for vision screening or to make sure your glasses / contact lens prescription is correct.  If you need glasses or contacts, wear them.  When you get new glasses or contacts, take time to get used to them.  Do not wear sunglasses or tinted lenses when indoors.  Ensure good hearing.  Have your hearing checked if you are having trouble hearing, or family and friends think you cannot hear them.  If you need a hearing aid, be sure it fits well and wear it.  Get enough rest.  Ensure about 7-9 hours of sleep every day.  Get up slowly from your bed or chairs.  Do not start walking until you are sure you feel  steady.  Wear non-skid, rubber-soled, low-heeled shoes.  Do not walk in socks, or in shoes and slippers with smooth soles.  If your PCP or therapist recommends using a cane or walker, use it regularly.  Make your home safer.  Increase lighting throughout the house, especially at the top and bottom of stairs.  Ensure lighting is easily turned on when getting up in the middle of the night.  Make sure there are two secure rails on all stairs.  Install grab bars in the bathtub / shower and near the toilet.  Consider using a shower chair and / or a hand-held shower.  Spread sand or salt on icy surfaces.  Beware of wet surfaces, which can be icy.  Tell your PCP if you have fallen.

## 2022-07-05 NOTE — PROGRESS NOTES
Subjective      MAW      Patient ID: Catia Ward is a 89 y.o. female.    HPI     Joint and muscular stiffness  Pt awakens stiff  It improves, but does not resolve     Pt with h/o stroke  Patient feels that she has residual involving the knee.   Pt is now on statin and asa   Pt has many questions about the event.     The following have been reviewed and updated as appropriate in this visit:   Allergies  Meds  Problems           Allergies   Allergen Reactions   • Codeine Nausea And Vomiting   • Gentamicin GI intolerance   • Gentamicin Sulfate Other (see comments)     Eye redness       Current Outpatient Medications   Medication Sig Dispense Refill   • artificial tears, dextran-hpm-glyc, (GENTEAL TEARS) 0.1-0.3-0.2 % drops eye drops Administer 1 drop into both eyes 3 (three) times a day as needed (dry eyes).     • aspirin 81 mg enteric coated tablet Take 1 tablet (81 mg total) by mouth daily. 30 tablet 0   • bacitracin-polymyxin B (POLYSPORIN) ophthalmic ointment Apply 1 application to both eyes nightly.     • betamethasone valerate (VALISONE) 0.1 % cream Apply 1 application topically as needed for irritation (eczema).     • calcium carbonate-vitamin D3 600 mg-10 mcg (400 unit) capsule Take 1 tablet by mouth 2 (two) times a day.     • fluorometholone (FML) 0.1 % ophthalmic suspension Administer 1 drop into the left eye 2 (two) times a day.     • ibandronate (BONIVA) 150 mg tablet TAKE 1 TABLET EVERY 30 DAYS. TAKE IN THE MORNING WITH GLASS OF WATER PRIOR TO FOOD, DO NOT LIE DOWN FOR 30 MINUTES. 3 tablet 2   • levothyroxine (SYNTHROID) 75 mcg tablet TAKE ONE TABLET BY MOUTH ONCE DAILY 90 tablet 0   • peg 400-propylene glycol (SYSTANE ULTRA) 0.4-0.3 % drops Administer 1 drop into both eyes 3 (three) times a day as needed.     • raloxifene (EVISTA) 60 mg tablet TAKE 1 TABLET ONCE DAILY 90 tablet 0   • rosuvastatin (CRESTOR) 20 mg tablet Take 1 tablet (20 mg total) by mouth daily. 90 tablet 1   • valACYclovir (VALTREX)  1 gram tablet Take 1,000 mg by mouth daily. To prevent herpes infection in her eyes   Because she is on fml eye drops       No current facility-administered medications for this visit.       Past Medical History:   Diagnosis Date   • Atopic keratoconjunctivitis    • Corneal ulcer of both eyes    • Femur fracture (CMS/HCC)    • Hypothyroidism    • S/P knee replacement 2012   • Shoulder fracture, left      Family History   Problem Relation Age of Onset   • Diabetes Biological Brother    • Lung cancer Biological Brother      Social History     Socioeconomic History   • Marital status:      Spouse name: Not on file   • Number of children: Not on file   • Years of education: Not on file   • Highest education level: Not on file   Occupational History   • Not on file   Tobacco Use   • Smoking status: Never Smoker   • Smokeless tobacco: Never Used   Substance and Sexual Activity   • Alcohol use: Yes     Comment: social   • Drug use: No   • Sexual activity: Defer   Other Topics Concern   • Not on file   Social History Narrative    Lives at [facility] - independent apartment      Independent with ADLS    Walk with walker only at nights     Social Determinants of Health     Financial Resource Strain: Not on file   Food Insecurity: No Food Insecurity   • Worried About Running Out of Food in the Last Year: Never true   • Ran Out of Food in the Last Year: Never true   Transportation Needs: Not on file   Physical Activity: Not on file   Stress: Not on file   Social Connections: Not on file   Intimate Partner Violence: Not on file   Housing Stability: Not on file       Review of Systems   Cardiovascular: Negative for chest pain, palpitations and leg swelling.   Gastrointestinal: Negative for abdominal distention, abdominal pain, constipation, diarrhea and vomiting.   Musculoskeletal: Negative for arthralgias.   Neurological:        Parasthesias of feet        Objective     Visit Vitals  /70 (BP Location: Right  "upper arm, Patient Position: Sitting)   Pulse 63   Temp 36.8 °C (98.3 °F) (Oral)   Ht 1.575 m (5' 2\")   Wt 62.2 kg (137 lb 3.2 oz)   SpO2 98%   BMI 25.09 kg/m²     BP Readings from Last 3 Encounters:   07/05/22 110/70   05/16/22 120/74   04/08/22 124/70     Wt Readings from Last 3 Encounters:   07/05/22 62.2 kg (137 lb 3.2 oz)   05/16/22 62.1 kg (137 lb)   04/08/22 61.7 kg (136 lb)       Physical Exam  Vitals and nursing note reviewed.   Constitutional:       General: She is not in acute distress.     Appearance: Normal appearance. She is well-developed.   HENT:      Head: Normocephalic and atraumatic.      Right Ear: External ear normal.      Left Ear: External ear normal.      Mouth/Throat:      Pharynx: No oropharyngeal exudate.   Eyes:      Pupils: Pupils are equal, round, and reactive to light.   Neck:      Thyroid: No thyromegaly.      Vascular: No JVD.      Trachea: No tracheal deviation.   Cardiovascular:      Rate and Rhythm: Normal rate and regular rhythm.      Pulses:           Carotid pulses are 2+ on the right side and 2+ on the left side.       Dorsalis pedis pulses are 2+ on the right side and 2+ on the left side.        Posterior tibial pulses are 2+ on the right side and 2+ on the left side.      Heart sounds: S1 normal and S2 normal. No murmur heard.    No S3 or S4 sounds.   Pulmonary:      Effort: No respiratory distress.      Breath sounds: No stridor. No decreased breath sounds, wheezing, rhonchi or rales.   Chest:   Breasts:      Right: No supraclavicular adenopathy.      Left: No supraclavicular adenopathy.       Abdominal:      General: Bowel sounds are normal. There is no abdominal bruit.      Palpations: Abdomen is soft.      Tenderness: There is no abdominal tenderness.   Lymphadenopathy:      Cervical: No cervical adenopathy.      Right cervical: No superficial or posterior cervical adenopathy.     Left cervical: No superficial or posterior cervical adenopathy.      Upper Body:      Right " upper body: No supraclavicular adenopathy.      Left upper body: No supraclavicular adenopathy.   Skin:     General: Skin is warm and dry.      Findings: No rash.   Neurological:      Mental Status: She is alert and oriented to person, place, and time.      Deep Tendon Reflexes:      Reflex Scores:       Patellar reflexes are 2+ on the right side and 2+ on the left side.     Comments: Ptosis left eye. (not new)    Cranial nerves II through XII grossly intact, motor +5/5 bilaterally upper extremity and lower extremity, cerebellar without ataxia finger to nose, heel to shin.     Psychiatric:         Speech: Speech normal.         Behavior: Behavior normal.         Lab Results   Component Value Date    WBC 6.69 03/29/2022    HGB 14.7 03/29/2022    HCT 45.5 (H) 03/29/2022    .7 (H) 03/29/2022     03/29/2022     Lab Results   Component Value Date    GLUCOSE 101 (H) 03/29/2022    CALCIUM 9.7 03/29/2022     03/29/2022    K 4.1 03/29/2022    CO2 23 03/29/2022     03/29/2022    BUN 21 (H) 03/29/2022    CREATININE 0.9 03/29/2022         Assessment/Plan   Problem List Items Addressed This Visit        Endocrine/Metabolic    Hypothyroidism    Relevant Orders    TSH w reflex FT4      Other Visit Diagnoses     Medicare annual wellness visit, subsequent    -  Primary    Vitamin B 12 deficiency        Relevant Orders    VITAMIN B12/FOLATE, SERUM PANEL    Hyperglycemia        Relevant Orders    Hemoglobin A1c    Dyslipidemia        Relevant Orders    LDL cholesterol, direct          Porsha Short DO  7/5/2022  Ingrid Ward is a 89 y.o. female who presents for a subsequent annual wellness visit.     Patient Care Team:  Porsha Short DO as PCP - General (Internal Medicine)  Enid Erwin MD as Consulting Physician (Neurology)  Xavier Barry MD as Consulting Physician (Cardiovascular Disease)    Comprehensive Medical and Social History  Patient Active Problem List   Diagnosis   •  Atopic keratoconjunctivitis   • Cicatricial entropion of both upper eyelids   • Cicatricial trichiasis   • Cornea ulcer, left   • Cystoid macular edema, right   • Epiretinal membrane, right eye   • Keratoconus of both eyes   • Pseudophakia, both eyes   • PVD (posterior vitreous detachment), right eye   • Wedging of vertebra (CMS/[facility])   • Compression fracture of body of thoracic vertebra (CMS/[facility])   • Age-related osteoporosis with current pathological fracture   • Hypothyroidism   • Chronic kidney disease, stage 3a (CMS/[facility])   • Transient left leg weakness   • Osteoporosis   • Nonrheumatic mitral valve regurgitation   • Abnormal ECG   • Cerebral infarction due to embolism of right middle cerebral artery (CMS/[facility])   • Cryptogenic stroke (CMS/[facility])     Past Medical History:   Diagnosis Date   • Atopic keratoconjunctivitis    • Corneal ulcer of both eyes    • Femur fracture (CMS/[facility])    • Hypothyroidism    • S/P knee replacement 2012   • Shoulder fracture, left      Past Surgical History:   Procedure Laterality Date   • ADENOIDECTOMY     • CATARACT EXTRACTION     • JOINT REPLACEMENT     • KERATOPLASTY     • TONSILLECTOMY       Allergies   Allergen Reactions   • Codeine Nausea And Vomiting   • Gentamicin GI intolerance   • Gentamicin Sulfate Other (see comments)     Eye redness     Current Outpatient Medications   Medication Sig Dispense Refill   • artificial tears, dextran-hpm-glyc, (GENTEAL TEARS) 0.1-0.3-0.2 % drops eye drops Administer 1 drop into both eyes 3 (three) times a day as needed (dry eyes).     • aspirin 81 mg enteric coated tablet Take 1 tablet (81 mg total) by mouth daily. 30 tablet 0   • bacitracin-polymyxin B (POLYSPORIN) ophthalmic ointment Apply 1 application to both eyes nightly.     • betamethasone valerate (VALISONE) 0.1 % cream Apply 1 application topically as needed for irritation (eczema).     • calcium carbonate-vitamin D3 600 mg-10 mcg (400 unit) capsule Take 1 tablet by mouth 2 (two) times a  "day.     • fluorometholone (FML) 0.1 % ophthalmic suspension Administer 1 drop into the left eye 2 (two) times a day.     • ibandronate (BONIVA) 150 mg tablet TAKE 1 TABLET EVERY 30 DAYS. TAKE IN THE MORNING WITH GLASS OF WATER PRIOR TO FOOD, DO NOT LIE DOWN FOR 30 MINUTES. 3 tablet 2   • levothyroxine (SYNTHROID) 75 mcg tablet TAKE ONE TABLET BY MOUTH ONCE DAILY 90 tablet 0   • peg 400-propylene glycol (SYSTANE ULTRA) 0.4-0.3 % drops Administer 1 drop into both eyes 3 (three) times a day as needed.     • raloxifene (EVISTA) 60 mg tablet TAKE 1 TABLET ONCE DAILY 90 tablet 0   • rosuvastatin (CRESTOR) 20 mg tablet Take 1 tablet (20 mg total) by mouth daily. 90 tablet 1   • valACYclovir (VALTREX) 1 gram tablet Take 1,000 mg by mouth daily. To prevent herpes infection in her eyes   Because she is on fml eye drops       No current facility-administered medications for this visit.     Social History     Tobacco Use   • Smoking status: Never Smoker   • Smokeless tobacco: Never Used   Substance Use Topics   • Alcohol use: Yes     Comment: social   • Drug use: No     Family History   Problem Relation Age of Onset   • Diabetes Biological Brother    • Lung cancer Biological Brother        Objective   Vitals  Vitals:    07/05/22 0925   BP: 110/70   BP Location: Right upper arm   Patient Position: Sitting   Pulse: 63   Temp: 36.8 °C (98.3 °F)   TempSrc: Oral   SpO2: 98%   Weight: 62.2 kg (137 lb 3.2 oz)   Height: 1.575 m (5' 2\")     Body mass index is 25.09 kg/m².    Advanced Care Plan  Does patient have advance directive?: Yes       Patient has Advance Directive: Advance Directive is NOT in chart, requested to bring in                             PHQ  Will the patient answer the depression questions?: Yes   Little interest or pleasure in doing things: Not at all   Feeling down, depressed, or hopeless: Not at all   Depression Risk: 0                                             Mini Cog  Score: 5  Result: Negative      Get Up " and Go  Result: Pass    STEADI Falls Risk  One or more falls in the last year: No                                           Worried about falling: No                 Hearing and Vision Screening   Hearing Screening    125Hz 250Hz 500Hz 1000Hz 2000Hz 3000Hz 4000Hz 6000Hz 8000Hz   Right ear:            Left ear:            Comments: pass    Vision Screening Comments: Pt sees eye dr yearly   See HRA for relevant hearing screening response.    Assessment/Plan   Diagnoses and all orders for this visit:    Medicare annual wellness visit, subsequent (Primary)    Vitamin B 12 deficiency  -     VITAMIN B12/FOLATE, SERUM PANEL; Future    Hypothyroidism, unspecified type  -     TSH w reflex FT4; Future    Hyperglycemia  -     Hemoglobin A1c; Future    Dyslipidemia  -     LDL cholesterol, direct; Future        See Patient Instructions (the written plan) which was given to the patient for PPPS and health risk factors with interventions.     1. Medicare annual wellness visit, subsequent    MA W completed today.  All categories of the Medicare annual well addressed, and entered in the EMR.  Please see attachments.    Pt has aged out of screenings.      2. 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)  Information from patient's event reviewed.  We discussed the findings on the imaging study of her brain.  We also discussed the fact that there were no blockages in her vessels leading to those areas.  We discussed small vessel disease.  We discussed embolic stroke.  Patient appreciative of the explanation.    We reviewed Dr. Erwin's evaluation together.  And we reviewed his request for further labs.  They are as follows.  See below.    3. Vitamin B 12 deficiency  - VITAMIN B12/FOLATE, SERUM PANEL; Future    4. Hypothyroidism, unspecified type  Then may need adjusted  - TSH w reflex FT4; Future    5. Hyperglycemia  - Hemoglobin A1c; Future    6. Dyslipidemia  Check LDL on current regimen.  - LDL  cholesterol, direct; Future    7. Transient left leg weakness  Normal neurologic exam.

## 2022-07-07 ENCOUNTER — LAB REQUISITION (OUTPATIENT)
Dept: LAB | Facility: HOSPITAL | Age: 87
End: 2022-07-07
Attending: INTERNAL MEDICINE
Payer: MEDICARE

## 2022-07-07 DIAGNOSIS — E53.8 DEFICIENCY OF OTHER SPECIFIED B GROUP VITAMINS: ICD-10-CM

## 2022-07-07 LAB
EST. AVERAGE GLUCOSE BLD GHB EST-MCNC: 117 MG/DL
FOLATE SERPL-MCNC: >20 NG/ML
HBA1C MFR BLD HPLC: 5.7 %
LDLC SERPL DIRECT ASSAY-MCNC: 42 MG/DL
TSH SERPL DL<=0.05 MIU/L-ACNC: 1.32 MIU/L (ref 0.34–5.6)
VIT B12 SERPL-MCNC: 337 PG/ML (ref 180–914)

## 2022-07-07 PROCEDURE — 83036 HEMOGLOBIN GLYCOSYLATED A1C: CPT | Performed by: INTERNAL MEDICINE

## 2022-07-07 PROCEDURE — 84443 ASSAY THYROID STIM HORMONE: CPT | Performed by: INTERNAL MEDICINE

## 2022-07-07 PROCEDURE — 82607 VITAMIN B-12: CPT | Performed by: INTERNAL MEDICINE

## 2022-07-07 PROCEDURE — 82746 ASSAY OF FOLIC ACID SERUM: CPT | Performed by: INTERNAL MEDICINE

## 2022-07-07 PROCEDURE — 83721 ASSAY OF BLOOD LIPOPROTEIN: CPT | Performed by: INTERNAL MEDICINE

## 2022-07-14 ENCOUNTER — TELEPHONE (OUTPATIENT)
Dept: INTERNAL MEDICINE | Facility: CLINIC | Age: 87
End: 2022-07-14

## 2022-07-17 NOTE — TELEPHONE ENCOUNTER
Irene, let pt know that although her B12 is normal, pts feel better and are sharper if it it greater than 400. She could start oral B12 1000 micrograms. And it has to be SUBLINGUAL, which means under the tongue. The problem with B12 is that it does not get absorbed in the stomach. Or she can get monthly injections here in the office. If she prefers inj, she can be set up for a diagnostic for the shot. Thanks.

## 2022-07-26 RX ORDER — LEVOTHYROXINE SODIUM 75 UG/1
TABLET ORAL
Qty: 90 TABLET | Refills: 0 | Status: SHIPPED | OUTPATIENT
Start: 2022-07-26 | End: 2022-10-26

## 2022-09-26 RX ORDER — RALOXIFENE HYDROCHLORIDE 60 MG/1
TABLET, FILM COATED ORAL
Qty: 90 TABLET | Refills: 0 | Status: SHIPPED | OUTPATIENT
Start: 2022-09-26 | End: 2023-01-06

## 2022-10-04 ENCOUNTER — LAB REQUISITION (OUTPATIENT)
Dept: LAB | Facility: HOSPITAL | Age: 87
End: 2022-10-04
Attending: INTERNAL MEDICINE
Payer: MEDICARE

## 2022-10-04 DIAGNOSIS — R79.9 ABNORMAL FINDING OF BLOOD CHEMISTRY, UNSPECIFIED: ICD-10-CM

## 2022-10-04 DIAGNOSIS — R79.82 ELEVATED C-REACTIVE PROTEIN (CRP): ICD-10-CM

## 2022-10-04 DIAGNOSIS — R79.89 OTHER SPECIFIED ABNORMAL FINDINGS OF BLOOD CHEMISTRY: ICD-10-CM

## 2022-10-04 LAB
ALBUMIN SERPL-MCNC: 3.7 G/DL (ref 3.4–5)
ALP SERPL-CCNC: 57 IU/L (ref 35–126)
ALT SERPL-CCNC: 14 IU/L (ref 11–54)
ANION GAP SERPL CALC-SCNC: 9 MEQ/L (ref 3–15)
AST SERPL-CCNC: 27 IU/L (ref 15–41)
BASOPHILS # BLD: 0.08 K/UL (ref 0.01–0.1)
BASOPHILS NFR BLD: 1.1 %
BILIRUB SERPL-MCNC: 1.4 MG/DL (ref 0.3–1.2)
BUN SERPL-MCNC: 17 MG/DL (ref 8–20)
CALCIUM SERPL-MCNC: 9 MG/DL (ref 8.9–10.3)
CHLORIDE SERPL-SCNC: 107 MEQ/L (ref 98–109)
CO2 SERPL-SCNC: 24 MEQ/L (ref 22–32)
CREAT SERPL-MCNC: 0.9 MG/DL (ref 0.6–1.1)
CRP SERPL-MCNC: 7.37 MG/L
DIFFERENTIAL METHOD BLD: ABNORMAL
EOSINOPHIL # BLD: 0.17 K/UL (ref 0.04–0.36)
EOSINOPHIL NFR BLD: 2.3 %
ERYTHROCYTE [DISTWIDTH] IN BLOOD BY AUTOMATED COUNT: 13.2 % (ref 11.7–14.4)
ERYTHROCYTE [SEDIMENTATION RATE] IN BLOOD BY WESTERGREN METHOD: 16 MM/HR
GFR SERPL CREATININE-BSD FRML MDRD: 59 ML/MIN/1.73M*2
GLUCOSE SERPL-MCNC: 102 MG/DL (ref 70–99)
HCT VFR BLDCO AUTO: 45.2 % (ref 35–45)
HGB BLD-MCNC: 14.7 G/DL (ref 11.8–15.7)
IMM GRANULOCYTES # BLD AUTO: 0.01 K/UL (ref 0–0.08)
IMM GRANULOCYTES NFR BLD AUTO: 0.1 %
LYMPHOCYTES # BLD: 2.34 K/UL (ref 1.2–3.5)
LYMPHOCYTES NFR BLD: 32.1 %
MCH RBC QN AUTO: 33.5 PG (ref 28–33.2)
MCHC RBC AUTO-ENTMCNC: 32.5 G/DL (ref 32.2–35.5)
MCV RBC AUTO: 103 FL (ref 83–98)
MONOCYTES # BLD: 0.67 K/UL (ref 0.28–0.8)
MONOCYTES NFR BLD: 9.2 %
NEUTROPHILS # BLD: 4.03 K/UL (ref 1.7–7)
NEUTS SEG NFR BLD: 55.2 %
NRBC BLD-RTO: 0 %
PDW BLD AUTO: 12.1 FL (ref 9.4–12.3)
PLATELET # BLD AUTO: 173 K/UL (ref 150–369)
POTASSIUM SERPL-SCNC: 4 MEQ/L (ref 3.6–5.1)
PROT SERPL-MCNC: 5.9 G/DL (ref 6–8.2)
RBC # BLD AUTO: 4.39 M/UL (ref 3.93–5.22)
SODIUM SERPL-SCNC: 140 MEQ/L (ref 136–144)
WBC # BLD AUTO: 7.3 K/UL (ref 3.8–10.5)

## 2022-10-04 PROCEDURE — 85652 RBC SED RATE AUTOMATED: CPT | Performed by: INTERNAL MEDICINE

## 2022-10-04 PROCEDURE — 86140 C-REACTIVE PROTEIN: CPT | Performed by: INTERNAL MEDICINE

## 2022-10-04 PROCEDURE — 36415 COLL VENOUS BLD VENIPUNCTURE: CPT | Performed by: INTERNAL MEDICINE

## 2022-10-04 PROCEDURE — 85025 COMPLETE CBC W/AUTO DIFF WBC: CPT | Performed by: INTERNAL MEDICINE

## 2022-10-04 PROCEDURE — 80053 COMPREHEN METABOLIC PANEL: CPT | Performed by: INTERNAL MEDICINE

## 2022-10-09 NOTE — PROGRESS NOTES
Cardiology  Office Progress Note         Reason for visit:   Chief Complaint   Patient presents with    Follow-up       HPI     Catia Ward is a 89 y.o. female who presents to the office for cardiovascular follow up and management of a cryptogenic stroke and loop recorder placement.    She was last seen in the office 4/8/22 in follow up from her recent hospitalization. She had been feeling well since her hospitalization but noticed that her stomach was a little upset since starting on Magnesium and she had stopped the medication. Her stomach issues then resolved. I also discussed signs and symptoms of bleeding from taking asa and what to watch for at home. I switched her statin medication to Crestor 20 mg daily. I reviewed her first loop recorder report that showed no evidence of afib. She did have NSVT for 6 seconds. She was planning to go away on vacation and I advised that she take her loop monitor with her.     She recently had an annual visit with her PCP Dr Short 7/5/22 and labs were ordered. She is now on a steroid taper for polymyalgia rheumatica and she is currently seeing Dr Pink. But she wonders if this could also be related to taking Crestor because her symptoms started around the same time she started the crestor.     10/4/22 LDL direct 42   AIC 5.7    Labs 7/7/22 LDL direct 42  Past Medical History:   Diagnosis Date    Atopic keratoconjunctivitis     Corneal ulcer of both eyes     Femur fracture (CMS/HCC)     Hypothyroidism     Polymyalgia rheumatica (CMS/HCC)     S/P knee replacement 2012    Shoulder fracture, left        Past Surgical History:   Procedure Laterality Date    ADENOIDECTOMY      CATARACT EXTRACTION      JOINT REPLACEMENT      KERATOPLASTY      TONSILLECTOMY         Social History     Tobacco Use    Smoking status: Never    Smokeless tobacco: Never   Substance Use Topics    Alcohol use: Yes     Comment: social    Drug use: No       Family History   Problem  Relation Age of Onset    Diabetes Biological Brother     Lung cancer Biological Brother        Allergies:  Codeine, Gentamicin, and Gentamicin sulfate    Current Outpatient Medications   Medication Sig Dispense Refill    aspirin 81 mg enteric coated tablet Take 1 tablet (81 mg total) by mouth daily. 30 tablet 0    betamethasone valerate (VALISONE) 0.1 % cream Apply 1 application topically as needed for irritation (eczema).      calcium carbonate-vitamin D3 600 mg-10 mcg (400 unit) capsule Take 1 tablet by mouth 2 (two) times a day.      fluorometholone (FML) 0.1 % ophthalmic suspension Administer 1 drop into the left eye 2 (two) times a day.      ibandronate (BONIVA) 150 mg tablet TAKE 1 TABLET EVERY 30 DAYS. TAKE IN THE MORNING WITH GLASS OF WATER PRIOR TO FOOD, DO NOT LIE DOWN FOR 30 MINUTES. 3 tablet 2    levothyroxine (SYNTHROID) 75 mcg tablet TAKE ONE TABLET BY MOUTH ONE TIME DAILY 90 tablet 0    raloxifene (EVISTA) 60 mg tablet TAKE 1 TABLET ONCE DAILY 90 tablet 0    valACYclovir (VALTREX) 1 gram tablet Take 1,000 mg by mouth daily. To prevent herpes infection in her eyes   Because she is on fml eye drops      artificial tears, dextran-hpm-glyc, (GENTEAL TEARS) 0.1-0.3-0.2 % drops eye drops Administer 1 drop into both eyes 3 (three) times a day as needed (dry eyes).      bacitracin-polymyxin B (POLYSPORIN) ophthalmic ointment Apply 1 application to both eyes nightly.      rosuvastatin (CRESTOR) 20 mg tablet TAKE ONE TABLET BY MOUTH DAILY 90 tablet 1     No current facility-administered medications for this visit.       Review of Systems   Constitutional: Negative for malaise/fatigue.   Cardiovascular: Negative for chest pain, dyspnea on exertion, irregular heartbeat, leg swelling and palpitations.   Respiratory: Negative for shortness of breath.    Musculoskeletal: Positive for myalgias and stiffness.   Neurological: Positive for vertigo. Negative for dizziness and light-headedness.        Objective     Vitals:    10/10/22 1004   BP: 116/60   Pulse: 71   SpO2: 98%       Wt Readings from Last 3 Encounters:   10/10/22 64 kg (141 lb)   07/05/22 62.2 kg (137 lb 3.2 oz)   05/16/22 62.1 kg (137 lb)       Body mass index is 25.78 kg/m².    Physical Exam  Vitals reviewed.   Cardiovascular:      Rate and Rhythm: Normal rate and regular rhythm.      Pulses: Normal pulses.           Posterior tibial pulses are 2+ on the right side and 2+ on the left side.      Heart sounds: Normal heart sounds.   Pulmonary:      Effort: Pulmonary effort is normal.   Musculoskeletal:         General: Normal range of motion.   Skin:     General: Skin is warm.      Capillary Refill: Capillary refill takes less than 2 seconds.   Neurological:      Mental Status: She is alert and oriented to person, place, and time.       ECG   Normal Sinus  Rhythm      Hematology  Lab Results   Component Value Date    WBC 7.30 10/04/2022    HGB 14.7 10/04/2022    HCT 45.2 (H) 10/04/2022     10/04/2022    INR 1.0 03/23/2022       Chemistries  Lab Results   Component Value Date     10/04/2022    K 4.0 10/04/2022     10/04/2022    CREATININE 0.9 10/04/2022    BUN 17 10/04/2022    CO2 24 10/04/2022    GLUCOSE 102 (H) 10/04/2022    CALCIUM 9.0 10/04/2022    ALT 14 10/04/2022    AST 27 10/04/2022       Cholesterol  Lab Results   Component Value Date    CHOL 186 03/24/2022    TRIG 57 03/24/2022    HDL 59 03/24/2022    LDLCALC 116 (H) 03/24/2022    NONHDLCALC 127 03/24/2022       Endocrine  Lab Results   Component Value Date    TSH 1.32 07/07/2022    HGBA1C 5.7 (H) 07/07/2022       Cardiac Imaging    TRANSTHORACIC ECHO (TTE) COMPLETE 03/24/2022    Interpretation Summary  · Normal-sized LV. Preserved LV systolic function. Estimated EF 65%. No regional wall motion abnormalities. Normal LV wall thickness. Normal diastolic filling pattern for age of the LV.  · Normal-sized RV. Normal RV systolic function.  · Normal-sized LA.  Normal-sized RA.  · Tricuspid aortic valve. Sclerotic aortic valve leaflets. Mild to moderate aortic valve regurgitation. No aortic valve stenosis. Aortic root normal. Ascending aorta normal-sized.  · Mild mitral annular calcification. Mild mitral valve regurgitation. No significant mitral valve stenosis.  · Mild tricuspid valve regurgitation. Estimated RVSP = 26 mmHg.  · Pulmonic valve not well visualized.  · Normal-sized IVC. IVC demonstrates normal respiratory collapse.  · No evidence of pericardial effusion.      Assessment/Plan     Cryptogenic stroke (CMS/HCC)  MRI brain: small acute ischemia in the right centrum semioval extending to the right precentral gyrus subcortical white matter and punctate focus of acute ischemia along the left postcentral gyrus cortex, concerning for an embolic event    -on ASA 81 mg daily per Neurology  -Continue crestor 20 mg daily   -S/p loop recorder placement 3/25/22  - s/p echo 3/24/22    No afib on ILR monitoring thus far. Will continue to monitor    Notes achiness since starting statin- will try stopping crestor and see if it makes a difference. I advised trial x1 week and contact me with how she is doing- we can resume or adjust medication accordingly but she should not plan to stop it outright on her own for longer than this brief trial period.    Polymyalgia rheumatica (CMS/HCC)  Stiffness in shoulders starting last week  Started on steroid taper per Dr. Pink, Rheum      Nonrheumatic aortic valve insufficiency  Mild-moderate on 3/24/2022 TTE  Plan to repeat at 2 years, approximately March 2024  Possible mild MR, mild TR      Orders Placed This Encounter   Procedures    ECG 12 lead     Scheduling Instructions:      PLEASE USE THIS ORDER FOR ECG'S PERFORMED IN PHYSICIAN OFFICES     Order Specific Question:   Release to patient     Answer:   Immediate       Follow Up Plans:  Return in about 6 months (around 4/10/2023).          JAYMIE, Yara Moise, am scribing for, and in  the presence of, Xavier Barry MD.     I, Xavier Barry MD, personally performed the services described in this documentation as scribed by Yraa Moise in my presence, and it is both accurate and complete.     Xavier Barry MD  10/10/2022

## 2022-10-10 ENCOUNTER — OFFICE VISIT (OUTPATIENT)
Dept: CARDIOLOGY | Facility: CLINIC | Age: 87
End: 2022-10-10
Payer: MEDICARE

## 2022-10-10 VITALS
SYSTOLIC BLOOD PRESSURE: 116 MMHG | OXYGEN SATURATION: 98 % | HEIGHT: 62 IN | HEART RATE: 71 BPM | WEIGHT: 141 LBS | DIASTOLIC BLOOD PRESSURE: 60 MMHG | BODY MASS INDEX: 25.95 KG/M2

## 2022-10-10 DIAGNOSIS — R01.1 MURMUR, CARDIAC: Primary | ICD-10-CM

## 2022-10-10 PROBLEM — M35.3 POLYMYALGIA RHEUMATICA (CMS/HCC): Status: ACTIVE | Noted: 2022-10-10

## 2022-10-10 PROBLEM — I35.1 NONRHEUMATIC AORTIC VALVE INSUFFICIENCY: Status: ACTIVE | Noted: 2022-10-10

## 2022-10-10 PROCEDURE — 99214 OFFICE O/P EST MOD 30 MIN: CPT | Performed by: INTERNAL MEDICINE

## 2022-10-10 PROCEDURE — 93000 ELECTROCARDIOGRAM COMPLETE: CPT | Performed by: INTERNAL MEDICINE

## 2022-10-10 RX ORDER — ROSUVASTATIN CALCIUM 20 MG/1
TABLET, COATED ORAL
Qty: 90 TABLET | Refills: 1 | Status: SHIPPED | OUTPATIENT
Start: 2022-10-10 | End: 2023-05-05 | Stop reason: SDUPTHER

## 2022-10-10 ASSESSMENT — ENCOUNTER SYMPTOMS
SHORTNESS OF BREATH: 0
DYSPNEA ON EXERTION: 0
LIGHT-HEADEDNESS: 0
PALPITATIONS: 0
STIFFNESS: 1
VERTIGO: 1
IRREGULAR HEARTBEAT: 0
DIZZINESS: 0
MYALGIAS: 1

## 2022-10-10 NOTE — TELEPHONE ENCOUNTER
[facility] at [facility] Refill Request      MA Notes:      Nurse Notes:      Last Office Visit: 4/8/2022  Last Telemedicine Visit: Visit date not found    Next Office Visit: 10/10/2022  Next Telemedicine Visit: Visit date not found     Most Recent BP Readings:  BP Readings from Last 3 Encounters:   07/05/22 110/70   05/16/22 120/74   04/08/22 124/70       Most recent Lab results:  Lab Results   Component Value Date    CHOL 186 03/24/2022    HDL 59 03/24/2022    TRIG 57 03/24/2022    NONHDLCALC 127 03/24/2022       Lab Results   Component Value Date    AST 27 10/04/2022    ALT 14 10/04/2022       Lab Results   Component Value Date     10/04/2022    K 4.0 10/04/2022    BUN 17 10/04/2022    CREATININE 0.9 10/04/2022       Current Medications:    Current Outpatient Medications:     raloxifene (EVISTA) 60 mg tablet, TAKE 1 TABLET ONCE DAILY, Disp: 90 tablet, Rfl: 0    artificial tears, dextran-hpm-glyc, (GENTEAL TEARS) 0.1-0.3-0.2 % drops eye drops, Administer 1 drop into both eyes 3 (three) times a day as needed (dry eyes)., Disp: , Rfl:     aspirin 81 mg enteric coated tablet, Take 1 tablet (81 mg total) by mouth daily., Disp: 30 tablet, Rfl: 0    bacitracin-polymyxin B (POLYSPORIN) ophthalmic ointment, Apply 1 application to both eyes nightly., Disp: , Rfl:     betamethasone valerate (VALISONE) 0.1 % cream, Apply 1 application topically as needed for irritation (eczema)., Disp: , Rfl:     calcium carbonate-vitamin D3 600 mg-10 mcg (400 unit) capsule, Take 1 tablet by mouth 2 (two) times a day., Disp: , Rfl:     fluorometholone (FML) 0.1 % ophthalmic suspension, Administer 1 drop into the left eye 2 (two) times a day., Disp: , Rfl:     ibandronate (BONIVA) 150 mg tablet, TAKE 1 TABLET EVERY 30 DAYS. TAKE IN THE MORNING WITH GLASS OF WATER PRIOR TO FOOD, DO NOT LIE DOWN FOR 30 MINUTES., Disp: 3 tablet, Rfl: 2    levothyroxine (SYNTHROID) 75 mcg tablet, TAKE ONE TABLET BY MOUTH ONE TIME  DAILY, Disp: 90 tablet, Rfl: 0    rosuvastatin (CRESTOR) 20 mg tablet, Take 1 tablet (20 mg total) by mouth daily., Disp: 90 tablet, Rfl: 1    valACYclovir (VALTREX) 1 gram tablet, Take 1,000 mg by mouth daily. To prevent herpes infection in her eyes  Because she is on fml eye drops, Disp: , Rfl:

## 2022-10-10 NOTE — ASSESSMENT & PLAN NOTE
MRI brain: small acute ischemia in the right centrum semioval extending to the right precentral gyrus subcortical white matter and punctate focus of acute ischemia along the left postcentral gyrus cortex, concerning for an embolic event    -on ASA 81 mg daily per Neurology  -Continue crestor 20 mg daily   -S/p loop recorder placement 3/25/22  - s/p echo 3/24/22    No afib on ILR monitoring thus far. Will continue to monitor    Notes achiness since starting statin- will try stopping crestor and see if it makes a difference. I advised trial x1 week and contact me with how she is doing- we can resume or adjust medication accordingly but she should not plan to stop it outright on her own for longer than this brief trial period.

## 2022-10-10 NOTE — ASSESSMENT & PLAN NOTE
Mild-moderate on 3/24/2022 TTE  Plan to repeat at 2 years, approximately March 2024  Possible mild MR, mild TR

## 2022-10-26 RX ORDER — LEVOTHYROXINE SODIUM 75 UG/1
TABLET ORAL
Qty: 90 TABLET | Refills: 0 | Status: SHIPPED | OUTPATIENT
Start: 2022-10-26 | End: 2023-02-01

## 2022-11-01 ENCOUNTER — OFFICE VISIT (OUTPATIENT)
Dept: INTERNAL MEDICINE | Facility: CLINIC | Age: 87
End: 2022-11-01
Payer: MEDICARE

## 2022-11-01 VITALS
WEIGHT: 138.6 LBS | TEMPERATURE: 98.2 F | SYSTOLIC BLOOD PRESSURE: 120 MMHG | DIASTOLIC BLOOD PRESSURE: 80 MMHG | HEART RATE: 68 BPM | BODY MASS INDEX: 25.34 KG/M2 | OXYGEN SATURATION: 98 %

## 2022-11-01 DIAGNOSIS — M35.3 POLYMYALGIA RHEUMATICA (CMS/HCC): Primary | ICD-10-CM

## 2022-11-01 DIAGNOSIS — R42 VERTIGO: ICD-10-CM

## 2022-11-01 DIAGNOSIS — I63.411 CEREBRAL INFARCTION DUE TO EMBOLISM OF RIGHT MIDDLE CEREBRAL ARTERY (CMS/HCC): ICD-10-CM

## 2022-11-01 DIAGNOSIS — M81.0 OSTEOPOROSIS, UNSPECIFIED OSTEOPOROSIS TYPE, UNSPECIFIED PATHOLOGICAL FRACTURE PRESENCE: ICD-10-CM

## 2022-11-01 DIAGNOSIS — E03.9 HYPOTHYROIDISM, UNSPECIFIED TYPE: ICD-10-CM

## 2022-11-01 DIAGNOSIS — N18.31 CHRONIC KIDNEY DISEASE, STAGE 3A (CMS/HCC): ICD-10-CM

## 2022-11-01 PROCEDURE — 99214 OFFICE O/P EST MOD 30 MIN: CPT | Performed by: NURSE PRACTITIONER

## 2022-11-01 ASSESSMENT — ENCOUNTER SYMPTOMS
PALPITATIONS: 0
ACTIVITY CHANGE: 0
CONSTIPATION: 0
COUGH: 0
AGITATION: 0
CHEST TIGHTNESS: 0
WHEEZING: 0
BLOOD IN STOOL: 0
ARTHRALGIAS: 0
NECK PAIN: 0
EYE DISCHARGE: 0
CHILLS: 0
NAUSEA: 0
LIGHT-HEADEDNESS: 0
JOINT SWELLING: 0
COLOR CHANGE: 0
ABDOMINAL DISTENTION: 0
FEVER: 0
VOMITING: 0
BACK PAIN: 0
SPEECH DIFFICULTY: 0
NUMBNESS: 0
TROUBLE SWALLOWING: 0
DYSPHORIC MOOD: 0
FATIGUE: 0
SHORTNESS OF BREATH: 0
DYSURIA: 0
ADENOPATHY: 0
SINUS PAIN: 0
DIARRHEA: 0
ABDOMINAL PAIN: 0
SORE THROAT: 0
DIFFICULTY URINATING: 0
FREQUENCY: 0
HEADACHES: 0
DIZZINESS: 0
EYE PAIN: 0
MYALGIAS: 0

## 2022-11-01 NOTE — PROGRESS NOTES
Subjective     Patient ID: Catia Ward is a 89 y.o. female.    Noted a spot on her L shin  She had similar spots on her arm previously and they resolved with steroid ointment    She has had 2 bouts of vertigo about 2 months ago  Has had vertigo in the past with years in between flares  Ever since her recent bouts of vertigo she feels that her balance is a bit different - using her walker more  She has been trying Epley's maneuvers at home and feels that it has helepd    Stopped steroid yesterday for PMR per rheumatology      Review of Systems   Constitutional: Negative for activity change, chills, fatigue and fever.   HENT: Negative for ear pain, hearing loss, sinus pain, sore throat and trouble swallowing.    Eyes: Negative for pain, discharge and visual disturbance.   Respiratory: Negative for cough, chest tightness, shortness of breath and wheezing.    Cardiovascular: Negative for chest pain, palpitations and leg swelling.   Gastrointestinal: Negative for abdominal distention, abdominal pain, blood in stool, constipation, diarrhea, nausea and vomiting.   Genitourinary: Negative for difficulty urinating, dysuria and frequency.   Musculoskeletal: Negative for arthralgias, back pain, joint swelling, myalgias and neck pain.   Skin: Negative for color change and rash.   Neurological: Negative for dizziness, speech difficulty, light-headedness, numbness and headaches.   Hematological: Negative for adenopathy.   Psychiatric/Behavioral: Negative for agitation and dysphoric mood.       Objective     Vitals:    11/01/22 0856   BP: 120/80   BP Location: Right upper arm   Patient Position: Sitting   Pulse: 68   Temp: 36.8 °C (98.2 °F)   TempSrc: Oral   SpO2: 98%   Weight: 62.9 kg (138 lb 9.6 oz)     Body mass index is 25.34 kg/m².    Physical Exam  Constitutional:       Appearance: She is well-developed.   HENT:      Head: Normocephalic and atraumatic.      Right Ear: External ear normal.      Left Ear: External ear  normal.      Nose: Nose normal.      Mouth/Throat:      Pharynx: No oropharyngeal exudate.   Eyes:      Conjunctiva/sclera: Conjunctivae normal.   Cardiovascular:      Rate and Rhythm: Normal rate and regular rhythm.      Heart sounds: Normal heart sounds. No murmur heard.    No friction rub. No gallop.   Pulmonary:      Effort: Pulmonary effort is normal. No respiratory distress.      Breath sounds: Normal breath sounds. No wheezing or rales.   Abdominal:      General: Bowel sounds are normal. There is no distension.      Palpations: Abdomen is soft. There is no mass.      Tenderness: There is no abdominal tenderness. There is no guarding.   Musculoskeletal:         General: Normal range of motion.      Cervical back: Normal range of motion and neck supple.   Lymphadenopathy:      Cervical: No cervical adenopathy.   Skin:     Findings: No erythema or rash.      Comments: 2x2cm area of ecchymosis L shin   Neurological:      Mental Status: She is alert and oriented to person, place, and time.      Cranial Nerves: No cranial nerve deficit.         Assessment/Plan   Diagnoses and all orders for this visit:    Polymyalgia rheumatica (CMS/HCC) (Primary)    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)    Hypothyroidism, unspecified type    Chronic kidney disease, stage 3a (CMS/HCC)    Osteoporosis, unspecified osteoporosis type, unspecified pathological fracture presence      1. Polymyalgia rheumatica (CMS/HCC)  Recent course of steroids. Feeling better now and will have labs done in the next week or so for rheumatology.    2. Vertigo  No recent symptoms since having 2 spells over a month ago. She has been doing Epley's maneuver at home.    3. 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)  Remains on aspirin. Gait steady with walker.     4. Hypothyroidism, unspecified type  Continue levothyroxine.    5. Chronic kidney disease, stage 3a (CMS/HCC)  Kidney  function stable    6. Osteoporosis, unspecified osteoporosis type, unspecified pathological fracture presence  No recent fracture. Continue medical management.    YFN Haas

## 2022-11-15 ENCOUNTER — LAB REQUISITION (OUTPATIENT)
Dept: LAB | Facility: HOSPITAL | Age: 87
End: 2022-11-15
Attending: INTERNAL MEDICINE
Payer: MEDICARE

## 2022-11-15 DIAGNOSIS — M35.3 POLYMYALGIA RHEUMATICA (CMS/HCC): ICD-10-CM

## 2022-11-15 LAB
ALBUMIN SERPL-MCNC: 3.5 G/DL (ref 3.4–5)
ALP SERPL-CCNC: 53 IU/L (ref 35–126)
ALT SERPL-CCNC: 17 IU/L (ref 11–54)
ANION GAP SERPL CALC-SCNC: 8 MEQ/L (ref 3–15)
AST SERPL-CCNC: 28 IU/L (ref 15–41)
BASOPHILS # BLD: 0.05 K/UL (ref 0.01–0.1)
BASOPHILS NFR BLD: 0.8 %
BILIRUB SERPL-MCNC: 1.2 MG/DL (ref 0.3–1.2)
BUN SERPL-MCNC: 14 MG/DL (ref 8–20)
CALCIUM SERPL-MCNC: 9.2 MG/DL (ref 8.9–10.3)
CHLORIDE SERPL-SCNC: 109 MEQ/L (ref 98–109)
CO2 SERPL-SCNC: 25 MEQ/L (ref 22–32)
CREAT SERPL-MCNC: 0.8 MG/DL (ref 0.6–1.1)
CRP SERPL-MCNC: <6 MG/L
DIFFERENTIAL METHOD BLD: ABNORMAL
EOSINOPHIL # BLD: 0.18 K/UL (ref 0.04–0.36)
EOSINOPHIL NFR BLD: 3 %
ERYTHROCYTE [DISTWIDTH] IN BLOOD BY AUTOMATED COUNT: 13.9 % (ref 11.7–14.4)
ERYTHROCYTE [SEDIMENTATION RATE] IN BLOOD BY WESTERGREN METHOD: 11 MM/HR
GFR SERPL CREATININE-BSD FRML MDRD: >60 ML/MIN/1.73M*2
GLUCOSE SERPL-MCNC: 96 MG/DL (ref 70–99)
HCT VFR BLDCO AUTO: 45.3 % (ref 35–45)
HGB BLD-MCNC: 14.5 G/DL (ref 11.8–15.7)
IMM GRANULOCYTES # BLD AUTO: 0.02 K/UL (ref 0–0.08)
IMM GRANULOCYTES NFR BLD AUTO: 0.3 %
LYMPHOCYTES # BLD: 1.87 K/UL (ref 1.2–3.5)
LYMPHOCYTES NFR BLD: 31.5 %
MCH RBC QN AUTO: 33 PG (ref 28–33.2)
MCHC RBC AUTO-ENTMCNC: 32 G/DL (ref 32.2–35.5)
MCV RBC AUTO: 103.2 FL (ref 83–98)
MONOCYTES # BLD: 0.53 K/UL (ref 0.28–0.8)
MONOCYTES NFR BLD: 8.9 %
NEUTROPHILS # BLD: 3.28 K/UL (ref 1.7–7)
NEUTS SEG NFR BLD: 55.5 %
NRBC BLD-RTO: 0 %
PDW BLD AUTO: 11.6 FL (ref 9.4–12.3)
PLATELET # BLD AUTO: 183 K/UL (ref 150–369)
POTASSIUM SERPL-SCNC: 4 MEQ/L (ref 3.6–5.1)
PROT SERPL-MCNC: 5.5 G/DL (ref 6–8.2)
RBC # BLD AUTO: 4.39 M/UL (ref 3.93–5.22)
SODIUM SERPL-SCNC: 142 MEQ/L (ref 136–144)
WBC # BLD AUTO: 5.93 K/UL (ref 3.8–10.5)

## 2022-11-15 PROCEDURE — 86140 C-REACTIVE PROTEIN: CPT | Performed by: INTERNAL MEDICINE

## 2022-11-15 PROCEDURE — 80053 COMPREHEN METABOLIC PANEL: CPT | Performed by: INTERNAL MEDICINE

## 2022-11-15 PROCEDURE — 36415 COLL VENOUS BLD VENIPUNCTURE: CPT | Performed by: INTERNAL MEDICINE

## 2022-11-15 PROCEDURE — 85652 RBC SED RATE AUTOMATED: CPT | Performed by: INTERNAL MEDICINE

## 2022-11-15 PROCEDURE — 85025 COMPLETE CBC W/AUTO DIFF WBC: CPT | Performed by: INTERNAL MEDICINE

## 2022-12-07 NOTE — PROGRESS NOTES
Pre-Operative   Consult Note         Reason for visit: Pre-operative cardiac evaluation    HPI     Catia Ward comes to the office today for preoperative cardiac evaluation prior to a corneal transplant on 1/3/22 (she is unsure of Ophthalmologist).    She was last seen in the office 10/10/22 in follow up from a cryptogenic stroke and loop recorder placement. She had also recently been started on a steroid taper due to polymyalgia rheumatica by her rheumatologist. She wondered if the crestor could be the cause of this since her symptoms started after this medication.  I advised that she could stop the medication for 1 week and see if this made a difference.  I asked her to contact me and let me know how she is feeling and we could resume or adjust from there as I did not want her to stop the medication for longer than the trial period. I reviewed her loop recorder and found no evidence of afib on the monitor but she did have an episode of NSVT for 6 seconds.  I planned to repeat a TTE in 2 years for her non-rheumatic aortic valve insufficiency.     She has recently been seen by her primary care and noted that she had been having issues with vertigo for the past few months. She was trying Epley's maneuvers at home and felt that this has helped.     She has been feeling well for the most part. She just got over a virus and today she is feeling better. She was tested for covid and was negative. She denies any cardiac complaints. She has not had any issues with vertigo recently. She is taking all medications as prescribed.     She reports that she can climb a flight of stairs and walk at least a city block in distance without chest discomfort or shortness of breath.    She denies any chest pain, palpitations, shortness of breath, PND, light-headedness, syncope or stroke-like symptoms.       Past Medical History:   Diagnosis Date   • Atopic keratoconjunctivitis    • Corneal ulcer of both eyes    • Femur fracture  (CMS/[facility])    • Hypothyroidism    • Polymyalgia rheumatica (CMS/[facility])    • S/P knee replacement 2012   • Shoulder fracture, left        Past Surgical History:   Procedure Laterality Date   • ADENOIDECTOMY     • CATARACT EXTRACTION     • JOINT REPLACEMENT     • KERATOPLASTY     • TONSILLECTOMY         Social History     Tobacco Use   Smoking Status Never   Smokeless Tobacco Never     Social History     Tobacco Use   • Smoking status: Never   • Smokeless tobacco: Never   Substance Use Topics   • Alcohol use: Yes     Comment: social   • Drug use: No       Family History   Problem Relation Age of Onset   • Diabetes Biological Brother    • Lung cancer Biological Brother        Allergies:  Codeine, Gentamicin, and Gentamicin sulfate    Current Outpatient Medications   Medication Sig Dispense Refill   • aspirin 81 mg enteric coated tablet Take 1 tablet (81 mg total) by mouth daily. 30 tablet 0   • bacitracin-polymyxin B (POLYSPORIN) ophthalmic ointment Apply 1 application to both eyes nightly.     • betamethasone valerate (VALISONE) 0.1 % cream Apply 1 application topically as needed for irritation (eczema).     • calcium carbonate-vitamin D3 600 mg-10 mcg (400 unit) capsule Take 1 tablet by mouth 2 (two) times a day.     • fluorometholone (FML) 0.1 % ophthalmic suspension Administer 1 drop into the left eye 2 (two) times a day.     • ibandronate (BONIVA) 150 mg tablet TAKE 1 TABLET EVERY 30 DAYS. TAKE IN THE MORNING WITH GLASS OF WATER PRIOR TO FOOD, DO NOT LIE DOWN FOR 30 MINUTES. 3 tablet 2   • levothyroxine (SYNTHROID) 75 mcg tablet TAKE ONE TABLET BY MOUTH ONE TIME DAILY 90 tablet 0   • raloxifene (EVISTA) 60 mg tablet TAKE 1 TABLET ONCE DAILY 90 tablet 0   • rosuvastatin (CRESTOR) 20 mg tablet TAKE ONE TABLET BY MOUTH DAILY 90 tablet 1   • valACYclovir (VALTREX) 1 gram tablet Take 1,000 mg by mouth daily. To prevent herpes infection in her eyes   Because she is on fml eye drops     • artificial tears, dextran-hpm-glyc,  (GENTEAL TEARS) 0.1-0.3-0.2 % drops eye drops Administer 1 drop into both eyes 3 (three) times a day as needed (dry eyes).       No current facility-administered medications for this visit.       Review of Systems   Constitutional: Negative for fever and malaise/fatigue.   Cardiovascular: Negative for chest pain, dyspnea on exertion, irregular heartbeat, leg swelling and palpitations.   Respiratory: Negative for shortness of breath.    Neurological: Negative for dizziness and light-headedness.       Objective     Vitals:    12/08/22 0813   BP: 130/70   Pulse: 80   SpO2: 97%       Wt Readings from Last 1 Encounters:   12/08/22 63 kg (139 lb)       Physical Exam  Constitutional: Well-developed and well-nourished. No distress.   HENT: Normocephalic and atraumatic. Moist mucous membranes.  Eyes: EOM are normal. Pupils are equal, round, and reactive to light.   Neck: No JVD present. No carotid bruits.  Cardiovascular: Normal rate and regular rhythm. No murmur, rub or gallop. Intact and symmetrical distal pulses.  Pulmonary/Chest: Normal effort and air entry. No wheezing, rales, ronchi.  Abdominal: Normal bowel sounds. Soft, non tender, no distension. No rebound or guarding. Musculoskeletal: No lower extremity edema or deformity.   Neurological: Alert and oriented to person, place, and time.   Skin: Skin is warm and dry. No rash.  Psychiatric: Normal mood, affect and behavior.    ECG   NSR, low voltage    Labs   Lab Results   Component Value Date    WBC 5.93 11/15/2022    HGB 14.5 11/15/2022    HCT 45.3 (H) 11/15/2022     11/15/2022    ALT 17 11/15/2022    AST 28 11/15/2022     11/15/2022    K 4.0 11/15/2022     11/15/2022    CREATININE 0.8 11/15/2022    BUN 14 11/15/2022    CO2 25 11/15/2022    PT 12.8 03/23/2022    GLUCOSE 96 11/15/2022    HGBA1C 5.7 (H) 07/07/2022         The patient has a Duke Activity Status Index score of 31.45, corresponding to an expected exertional capacity of 6.61  METS.      Assessment/Plan     Cryptogenic stroke (CMS/HCC)  MRI brain: small acute ischemia in the right centrum semioval extending to the right precentral gyrus subcortical white matter and punctate focus of acute ischemia along the left postcentral gyrus cortex, concerning for an embolic event    -on ASA 81 mg daily per Neurology  -Continue crestor 20 mg daily   -S/p loop recorder placement 3/25/22  - s/p echo 3/24/22    No afib on ILR monitoring thus far. Will continue to monitor    Polymyalgia rheumatica (CMS/HCC)  Stiffness in shoulders at previous visit- treated with steroid taper  Management per Dr. Pink, Rheum      Nonrheumatic aortic valve insufficiency  Mild-moderate AI on 3/24/2022 TTE  Plan to repeat at 2 years, approximately March 2024  Also mild MR, mild TR    Preop cardiovascular exam  Plan is for corneal transplant on 1/3/23   - Pt has no active cardiopulmonary symptoms  - Pt has adequate functional capacity, able to walk 2 blocks or climb a flight of stairs without cardiopulmonary limitation at recent baseline  - This procedure is not included in the NSQIP surgical database and so the Gomez and other modern non-cardiac surgical risk calculators are not applicable. However:   - In historic registries, these were found to be low risk procedures (<1% risk MACE in all comers)  - Given low risk of MACE (<1%) and absence of acute cardiac decompensation; additional testing or intervention from our standpoint would not     Pt is an appropriate candidate from our perspective           Follow Up Plans:  6 months         IYara, am scribing for, and in the presence of, Xavier Barry MD.     IXavier MD, personally performed the services described in this documentation as scribed by Yara Moise in my presence, and it is both accurate and complete.     Xavier Barry MD  12/8/2022

## 2022-12-08 ENCOUNTER — OFFICE VISIT (OUTPATIENT)
Dept: CARDIOLOGY | Facility: CLINIC | Age: 87
End: 2022-12-08
Payer: MEDICARE

## 2022-12-08 ENCOUNTER — CONSULT (OUTPATIENT)
Dept: INTERNAL MEDICINE | Facility: CLINIC | Age: 87
End: 2022-12-08
Payer: MEDICARE

## 2022-12-08 VITALS
HEART RATE: 80 BPM | BODY MASS INDEX: 25.58 KG/M2 | SYSTOLIC BLOOD PRESSURE: 130 MMHG | WEIGHT: 139 LBS | HEIGHT: 62 IN | DIASTOLIC BLOOD PRESSURE: 70 MMHG | OXYGEN SATURATION: 97 %

## 2022-12-08 VITALS
DIASTOLIC BLOOD PRESSURE: 68 MMHG | HEART RATE: 92 BPM | SYSTOLIC BLOOD PRESSURE: 124 MMHG | BODY MASS INDEX: 26.24 KG/M2 | OXYGEN SATURATION: 97 % | WEIGHT: 139 LBS | HEIGHT: 61 IN | TEMPERATURE: 97.6 F

## 2022-12-08 DIAGNOSIS — M81.0 OSTEOPOROSIS, UNSPECIFIED OSTEOPOROSIS TYPE, UNSPECIFIED PATHOLOGICAL FRACTURE PRESENCE: ICD-10-CM

## 2022-12-08 DIAGNOSIS — Z01.818 PRE-OP EXAMINATION: Primary | ICD-10-CM

## 2022-12-08 DIAGNOSIS — Z01.818 PRE-OP EVALUATION: Primary | ICD-10-CM

## 2022-12-08 DIAGNOSIS — E03.9 HYPOTHYROIDISM, UNSPECIFIED TYPE: ICD-10-CM

## 2022-12-08 DIAGNOSIS — N18.31 CHRONIC KIDNEY DISEASE, STAGE 3A (CMS/HCC): ICD-10-CM

## 2022-12-08 DIAGNOSIS — M35.3 POLYMYALGIA RHEUMATICA (CMS/HCC): ICD-10-CM

## 2022-12-08 DIAGNOSIS — I63.411 CEREBRAL INFARCTION DUE TO EMBOLISM OF RIGHT MIDDLE CEREBRAL ARTERY (CMS/HCC): ICD-10-CM

## 2022-12-08 PROBLEM — Z01.810 PREOP CARDIOVASCULAR EXAM: Status: ACTIVE | Noted: 2022-12-08

## 2022-12-08 PROCEDURE — 99214 OFFICE O/P EST MOD 30 MIN: CPT | Performed by: NURSE PRACTITIONER

## 2022-12-08 PROCEDURE — 93000 ELECTROCARDIOGRAM COMPLETE: CPT | Performed by: INTERNAL MEDICINE

## 2022-12-08 PROCEDURE — 99214 OFFICE O/P EST MOD 30 MIN: CPT | Performed by: INTERNAL MEDICINE

## 2022-12-08 ASSESSMENT — ENCOUNTER SYMPTOMS
COUGH: 0
DIAPHORESIS: 0
DYSPNEA ON EXERTION: 0
NERVOUS/ANXIOUS: 0
SHORTNESS OF BREATH: 0
WHEEZING: 0
SORE THROAT: 0
DIZZINESS: 0
WEAKNESS: 0
DYSPHORIC MOOD: 0
LIGHT-HEADEDNESS: 0
BRUISES/BLEEDS EASILY: 0
PALPITATIONS: 0
ADENOPATHY: 0
FEVER: 0
NUMBNESS: 0
ACTIVITY CHANGE: 0
WOUND: 0
JOINT SWELLING: 0
CONSTIPATION: 0
BLOOD IN STOOL: 0
NECK PAIN: 0
SHORTNESS OF BREATH: 0
DIARRHEA: 0
FEVER: 0
NAUSEA: 0
LIGHT-HEADEDNESS: 0
SINUS PAIN: 0
IRREGULAR HEARTBEAT: 0
ABDOMINAL PAIN: 0
DIZZINESS: 0
DYSURIA: 0
CHILLS: 0
HEMATURIA: 0
BACK PAIN: 0
PALPITATIONS: 0
HEADACHES: 0
TROUBLE SWALLOWING: 0
VOMITING: 0

## 2022-12-08 NOTE — ASSESSMENT & PLAN NOTE
Mild-moderate AI on 3/24/2022 TTE  Plan to repeat at 2 years, approximately March 2024  Also mild MR, mild TR

## 2022-12-08 NOTE — ASSESSMENT & PLAN NOTE
Stiffness in shoulders at previous visit- treated with steroid taper  Management per Dr. Pink, Rheum

## 2022-12-08 NOTE — PROGRESS NOTES
Subjective      Patient ID: Catia Ward is a 89 y.o. female.    Scheduled for corneal transplant on 1/3/23 at [facility] in [place]      The following have been reviewed and updated as appropriate in this visit:        Review of Systems   Constitutional: Negative for activity change, chills, diaphoresis and fever.   HENT: Negative for ear pain, hearing loss, sinus pain, sore throat and trouble swallowing.    Eyes: Positive for visual disturbance.   Respiratory: Negative for cough, shortness of breath and wheezing.    Cardiovascular: Negative for chest pain, palpitations and leg swelling.   Gastrointestinal: Negative for abdominal pain, blood in stool, constipation, diarrhea, nausea and vomiting.   Genitourinary: Negative for dysuria and hematuria.   Musculoskeletal: Negative for back pain, gait problem, joint swelling and neck pain.   Skin: Negative for rash and wound.   Neurological: Negative for dizziness, weakness, light-headedness, numbness and headaches.   Hematological: Negative for adenopathy. Does not bruise/bleed easily.   Psychiatric/Behavioral: Negative for dysphoric mood. The patient is not nervous/anxious.        Allergies   Allergen Reactions   • Codeine Nausea And Vomiting   • Gentamicin GI intolerance   • Gentamicin Sulfate Other (see comments)     Eye redness       Current Outpatient Medications   Medication Sig Dispense Refill   • artificial tears, dextran-hpm-glyc, (GENTEAL TEARS) 0.1-0.3-0.2 % drops eye drops Administer 1 drop into both eyes 3 (three) times a day as needed (dry eyes).     • aspirin 81 mg enteric coated tablet Take 1 tablet (81 mg total) by mouth daily. 30 tablet 0   • bacitracin-polymyxin B (POLYSPORIN) ophthalmic ointment Apply 1 application to both eyes nightly.     • betamethasone valerate (VALISONE) 0.1 % cream Apply 1 application topically as needed for irritation (eczema).     • calcium carbonate-vitamin D3 600 mg-10 mcg (400 unit) capsule Take 1 tablet by  mouth 2 (two) times a day.     • fluorometholone (FML) 0.1 % ophthalmic suspension Administer 1 drop into the left eye 2 (two) times a day.     • ibandronate (BONIVA) 150 mg tablet TAKE 1 TABLET EVERY 30 DAYS. TAKE IN THE MORNING WITH GLASS OF WATER PRIOR TO FOOD, DO NOT LIE DOWN FOR 30 MINUTES. 3 tablet 2   • levothyroxine (SYNTHROID) 75 mcg tablet TAKE ONE TABLET BY MOUTH ONE TIME DAILY 90 tablet 0   • raloxifene (EVISTA) 60 mg tablet TAKE 1 TABLET ONCE DAILY 90 tablet 0   • rosuvastatin (CRESTOR) 20 mg tablet TAKE ONE TABLET BY MOUTH DAILY 90 tablet 1   • valACYclovir (VALTREX) 1 gram tablet Take 1,000 mg by mouth daily. To prevent herpes infection in her eyes   Because she is on fml eye drops       No current facility-administered medications for this visit.     Past Medical History:   Diagnosis Date   • Atopic keratoconjunctivitis    • Corneal ulcer of both eyes    • Femur fracture (CMS/[facility])    • Hypothyroidism    • Polymyalgia rheumatica (CMS/[facility])    • S/P knee replacement 2012   • Shoulder fracture, left      Past Surgical History:   Procedure Laterality Date   • ADENOIDECTOMY     • CATARACT EXTRACTION     • JOINT REPLACEMENT     • KERATOPLASTY     • TONSILLECTOMY       Family History   Problem Relation Age of Onset   • Diabetes Biological Brother    • Lung cancer Biological Brother      Social History     Socioeconomic History   • Marital status:      Spouse name: Not on file   • Number of children: Not on file   • Years of education: Not on file   • Highest education level: Not on file   Occupational History   • Not on file   Tobacco Use   • Smoking status: Never   • Smokeless tobacco: Never   Substance and Sexual Activity   • Alcohol use: Yes     Comment: social - maybe 3-4 drinks/week   • Drug use: No   • Sexual activity: Defer   Other Topics Concern   • Not on file   Social History Narrative    Lives at [facility] - independent apartment      Independent with ADLS    Walk with walker only at  nights     Social Determinants of Health     Financial Resource Strain: Not on file   Food Insecurity: No Food Insecurity   • Worried About Running Out of Food in the Last Year: Never true   • Ran Out of Food in the Last Year: Never true   Transportation Needs: Not on file   Physical Activity: Not on file   Stress: Not on file   Social Connections: Not on file   Intimate Partner Violence: Not on file   Housing Stability: Not on file         Objective     Physical Exam  Vitals reviewed.   Constitutional:       Appearance: She is well-developed and well-nourished.   HENT:      Head: Normocephalic and atraumatic.      Right Ear: External ear normal.      Left Ear: External ear normal.      Nose: Nose normal.      Mouth/Throat:      Mouth: Oropharynx is clear and moist.      Pharynx: No oropharyngeal exudate.   Eyes:      Conjunctiva/sclera: Conjunctivae normal.   Neck:      Thyroid: No thyromegaly.      Vascular: Normal carotid pulses. No carotid bruit.   Cardiovascular:      Rate and Rhythm: Normal rate and regular rhythm.      Pulses: Intact distal pulses.      Heart sounds: Normal heart sounds. No murmur heard.    No friction rub. No gallop.   Pulmonary:      Effort: Pulmonary effort is normal. No respiratory distress.      Breath sounds: Normal breath sounds. No wheezing or rales.   Abdominal:      General: Bowel sounds are normal.      Palpations: Abdomen is soft. There is no mass.      Tenderness: There is no abdominal tenderness. There is no guarding or rebound.   Musculoskeletal:         General: No deformity or edema. Normal range of motion.      Cervical back: Normal range of motion and neck supple.   Lymphadenopathy:      Cervical: No cervical adenopathy.   Skin:     General: Skin is warm and dry.      Findings: No rash.   Neurological:      Mental Status: She is alert and oriented to person, place, and time.      Cranial Nerves: No cranial nerve deficit.      Deep Tendon Reflexes: Reflexes normal.  "  Psychiatric:         Mood and Affect: Mood and affect normal.         Behavior: Behavior normal.         Thought Content: Thought content normal.         Judgment: Judgment normal.         Visit Vitals  /68 (BP Location: Left upper arm, Patient Position: Sitting)   Pulse 92   Temp 36.4 °C (97.6 °F) (Oral)   Ht 1.549 m (5' 1\")   Wt 63 kg (139 lb)   SpO2 97%   BMI 26.26 kg/m²     Lab Results   Component Value Date    WBC 5.93 11/15/2022    HGB 14.5 11/15/2022    HCT 45.3 (H) 11/15/2022    .2 (H) 11/15/2022     11/15/2022     Lab Results   Component Value Date    GLUCOSE 96 11/15/2022    CALCIUM 9.2 11/15/2022     11/15/2022    K 4.0 11/15/2022    CO2 25 11/15/2022     11/15/2022    BUN 14 11/15/2022    CREATININE 0.8 11/15/2022       Assessment/Plan   Problem List Items Addressed This Visit        Nervous    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 Pre-op evaluation  Patient's PMH, PSH, medication list, allergies, and prior anesthesia history reviewed. PT denies any previous issues with anesthesia.  No known history of sleep apnea.  Had pre op cardiac clearance today  No contraindications to proposed surgery.       2. Polymyalgia rheumatica (CMS/HCC)  Doing well. No recent need for steroids.    3. Hypothyroidism, unspecified type  Continue levothyroxine.    4. Osteoporosis, unspecified osteoporosis type, unspecified pathological fracture presence  On Evista    5. Chronic kidney disease, stage 3a (CMS/HCC)  Lab Results   Component Value Date    GLUCOSE 96 11/15/2022    CALCIUM 9.2 11/15/2022     11/15/2022    K 4.0 11/15/2022    CO2 25 11/15/2022     11/15/2022    BUN 14 11/15/2022    " CREATININE 0.8 11/15/2022    avoid nephrotoxins    6. 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 Has a loop recorder.    YFN Haas

## 2022-12-08 NOTE — ASSESSMENT & PLAN NOTE
MRI brain: small acute ischemia in the right centrum semioval extending to the right precentral gyrus subcortical white matter and punctate focus of acute ischemia along the left postcentral gyrus cortex, concerning for an embolic event    -on ASA 81 mg daily per Neurology  -Continue crestor 20 mg daily   -S/p loop recorder placement 3/25/22  - s/p echo 3/24/22    No afib on ILR monitoring thus far. Will continue to monitor

## 2022-12-08 NOTE — ASSESSMENT & PLAN NOTE
Plan is for corneal transplant on 1/3/23   - Pt has no active cardiopulmonary symptoms  - Pt has adequate functional capacity, able to walk 2 blocks or climb a flight of stairs without cardiopulmonary limitation at recent baseline  - This procedure is not included in the NSQIP surgical database and so the Gomez and other modern non-cardiac surgical risk calculators are not applicable. However:   - In historic registries, these were found to be low risk procedures (<1% risk MACE in all comers)  - Given low risk of MACE (<1%) and absence of acute cardiac decompensation; additional testing or intervention from our standpoint would not     Pt is an appropriate candidate from our perspective

## 2022-12-22 ENCOUNTER — LAB REQUISITION (OUTPATIENT)
Dept: LAB | Facility: HOSPITAL | Age: 87
End: 2022-12-22
Attending: INTERNAL MEDICINE
Payer: MEDICARE

## 2022-12-22 DIAGNOSIS — M35.3 POLYMYALGIA RHEUMATICA (CMS/HCC): ICD-10-CM

## 2022-12-22 DIAGNOSIS — R79.9 ABNORMAL FINDING OF BLOOD CHEMISTRY, UNSPECIFIED: ICD-10-CM

## 2022-12-22 DIAGNOSIS — M06.9 RHEUMATOID ARTHRITIS, UNSPECIFIED: ICD-10-CM

## 2022-12-22 DIAGNOSIS — R79.82 ELEVATED C-REACTIVE PROTEIN (CRP): ICD-10-CM

## 2022-12-22 DIAGNOSIS — R79.89 OTHER SPECIFIED ABNORMAL FINDINGS OF BLOOD CHEMISTRY: ICD-10-CM

## 2022-12-22 LAB
ALBUMIN SERPL-MCNC: 3.6 G/DL (ref 3.4–5)
ALP SERPL-CCNC: 58 IU/L (ref 35–126)
ALT SERPL-CCNC: 17 IU/L (ref 11–54)
ANION GAP SERPL CALC-SCNC: 8 MEQ/L (ref 3–15)
AST SERPL-CCNC: 30 IU/L (ref 15–41)
BASOPHILS # BLD: 0.11 K/UL (ref 0.01–0.1)
BASOPHILS NFR BLD: 1.6 %
BILIRUB SERPL-MCNC: 1.2 MG/DL (ref 0.3–1.2)
BUN SERPL-MCNC: 16 MG/DL (ref 8–20)
CALCIUM SERPL-MCNC: 9 MG/DL (ref 8.9–10.3)
CHLORIDE SERPL-SCNC: 108 MEQ/L (ref 98–109)
CO2 SERPL-SCNC: 22 MEQ/L (ref 22–32)
CREAT SERPL-MCNC: 0.8 MG/DL (ref 0.6–1.1)
CRP SERPL-MCNC: <6 MG/L
DIFFERENTIAL METHOD BLD: ABNORMAL
EOSINOPHIL # BLD: 0.27 K/UL (ref 0.04–0.36)
EOSINOPHIL NFR BLD: 4 %
ERYTHROCYTE [DISTWIDTH] IN BLOOD BY AUTOMATED COUNT: 13.8 % (ref 11.7–14.4)
ERYTHROCYTE [SEDIMENTATION RATE] IN BLOOD BY WESTERGREN METHOD: 16 MM/HR
GFR SERPL CREATININE-BSD FRML MDRD: >60 ML/MIN/1.73M*2
GLUCOSE SERPL-MCNC: 145 MG/DL (ref 70–99)
HCT VFR BLDCO AUTO: 42.2 % (ref 35–45)
HGB BLD-MCNC: 14.3 G/DL (ref 11.8–15.7)
IMM GRANULOCYTES # BLD AUTO: 0.01 K/UL (ref 0–0.08)
IMM GRANULOCYTES NFR BLD AUTO: 0.1 %
LYMPHOCYTES # BLD: 2.66 K/UL (ref 1.2–3.5)
LYMPHOCYTES NFR BLD: 39.2 %
MCH RBC QN AUTO: 34.4 PG (ref 28–33.2)
MCHC RBC AUTO-ENTMCNC: 33.9 G/DL (ref 32.2–35.5)
MCV RBC AUTO: 101.4 FL (ref 83–98)
MONOCYTES # BLD: 0.53 K/UL (ref 0.28–0.8)
MONOCYTES NFR BLD: 7.8 %
NEUTROPHILS # BLD: 3.2 K/UL (ref 1.7–7)
NEUTS SEG NFR BLD: 47.3 %
NRBC BLD-RTO: 0 %
PDW BLD AUTO: 12 FL (ref 9.4–12.3)
PLATELET # BLD AUTO: 177 K/UL (ref 150–369)
POTASSIUM SERPL-SCNC: 3.9 MEQ/L (ref 3.6–5.1)
PROT SERPL-MCNC: 6 G/DL (ref 6–8.2)
RBC # BLD AUTO: 4.16 M/UL (ref 3.93–5.22)
SODIUM SERPL-SCNC: 138 MEQ/L (ref 136–144)
WBC # BLD AUTO: 6.78 K/UL (ref 3.8–10.5)

## 2022-12-22 PROCEDURE — 86140 C-REACTIVE PROTEIN: CPT | Performed by: INTERNAL MEDICINE

## 2022-12-22 PROCEDURE — 36415 COLL VENOUS BLD VENIPUNCTURE: CPT | Performed by: INTERNAL MEDICINE

## 2022-12-22 PROCEDURE — 85652 RBC SED RATE AUTOMATED: CPT | Performed by: INTERNAL MEDICINE

## 2022-12-22 PROCEDURE — 85025 COMPLETE CBC W/AUTO DIFF WBC: CPT | Performed by: INTERNAL MEDICINE

## 2022-12-22 PROCEDURE — 80053 COMPREHEN METABOLIC PANEL: CPT | Performed by: INTERNAL MEDICINE

## 2023-01-06 RX ORDER — RALOXIFENE HYDROCHLORIDE 60 MG/1
TABLET, FILM COATED ORAL
Qty: 90 TABLET | Refills: 0 | Status: SHIPPED | OUTPATIENT
Start: 2023-01-06 | End: 2023-05-02

## 2023-01-10 ENCOUNTER — OFFICE VISIT (OUTPATIENT)
Dept: INTERNAL MEDICINE | Facility: CLINIC | Age: 88
End: 2023-01-10
Payer: MEDICARE

## 2023-01-10 ENCOUNTER — TELEPHONE (OUTPATIENT)
Dept: INTERNAL MEDICINE | Facility: CLINIC | Age: 88
End: 2023-01-10

## 2023-01-10 VITALS
WEIGHT: 139 LBS | OXYGEN SATURATION: 99 % | BODY MASS INDEX: 25.58 KG/M2 | DIASTOLIC BLOOD PRESSURE: 82 MMHG | HEIGHT: 62 IN | SYSTOLIC BLOOD PRESSURE: 126 MMHG | HEART RATE: 84 BPM | TEMPERATURE: 97.6 F

## 2023-01-10 DIAGNOSIS — E03.9 HYPOTHYROIDISM, UNSPECIFIED TYPE: ICD-10-CM

## 2023-01-10 DIAGNOSIS — I63.411 CEREBRAL INFARCTION DUE TO EMBOLISM OF RIGHT MIDDLE CEREBRAL ARTERY (CMS/HCC): ICD-10-CM

## 2023-01-10 DIAGNOSIS — N18.31 CHRONIC KIDNEY DISEASE, STAGE 3A (CMS/HCC): ICD-10-CM

## 2023-01-10 DIAGNOSIS — Z01.818 PRE-OP EVALUATION: Primary | ICD-10-CM

## 2023-01-10 DIAGNOSIS — I35.1 NONRHEUMATIC AORTIC VALVE INSUFFICIENCY: ICD-10-CM

## 2023-01-10 DIAGNOSIS — M35.3 POLYMYALGIA RHEUMATICA (CMS/HCC): ICD-10-CM

## 2023-01-10 PROCEDURE — 99214 OFFICE O/P EST MOD 30 MIN: CPT | Performed by: NURSE PRACTITIONER

## 2023-01-10 ASSESSMENT — ENCOUNTER SYMPTOMS
NUMBNESS: 1
DIAPHORESIS: 0
BRUISES/BLEEDS EASILY: 0
DIZZINESS: 0
DIARRHEA: 0
DYSPHORIC MOOD: 0
WOUND: 0
HEMATURIA: 0
TROUBLE SWALLOWING: 0
NERVOUS/ANXIOUS: 0
WEAKNESS: 0
CHILLS: 0
ADENOPATHY: 0
BLOOD IN STOOL: 0
NAUSEA: 0
DYSURIA: 0
FEVER: 0
SINUS PAIN: 0
SORE THROAT: 0
LIGHT-HEADEDNESS: 0
CONSTIPATION: 0
ACTIVITY CHANGE: 0
SHORTNESS OF BREATH: 0
ABDOMINAL PAIN: 0
JOINT SWELLING: 0
BACK PAIN: 0
COUGH: 0
VOMITING: 0
PALPITATIONS: 0
WHEEZING: 0
HEADACHES: 0
NECK PAIN: 0

## 2023-01-10 NOTE — PROGRESS NOTES
Subjective      Patient ID: Catia Ward is a 89 y.o. female.    PT is scheduled for corneal transplant on 2/7      The following have been reviewed and updated as appropriate in this visit:        Review of Systems   Constitutional: Negative for activity change, chills, diaphoresis and fever.   HENT: Negative for ear pain, hearing loss, sinus pain, sore throat and trouble swallowing.    Eyes: Negative for visual disturbance.   Respiratory: Negative for cough, shortness of breath and wheezing.    Cardiovascular: Negative for chest pain, palpitations and leg swelling.   Gastrointestinal: Negative for abdominal pain, blood in stool, constipation, diarrhea, nausea and vomiting.   Genitourinary: Negative for dysuria and hematuria.   Musculoskeletal: Negative for back pain, gait problem, joint swelling and neck pain.   Skin: Negative for rash and wound.   Neurological: Positive for numbness (BL feet, chronic). Negative for dizziness, weakness, light-headedness and headaches.   Hematological: Negative for adenopathy. Does not bruise/bleed easily.   Psychiatric/Behavioral: Negative for dysphoric mood. The patient is not nervous/anxious.        Allergies   Allergen Reactions   • Codeine Nausea And Vomiting   • Gentamicin GI intolerance   • Gentamicin Sulfate Other (see comments)     Eye redness       Current Outpatient Medications   Medication Sig Dispense Refill   • artificial tears, dextran-hpm-glyc, (GENTEAL TEARS) 0.1-0.3-0.2 % drops eye drops Administer 1 drop into both eyes 3 (three) times a day as needed (dry eyes).     • aspirin 81 mg enteric coated tablet Take 1 tablet (81 mg total) by mouth daily. 30 tablet 0   • bacitracin-polymyxin B (POLYSPORIN) ophthalmic ointment Apply 1 application to both eyes nightly.     • betamethasone valerate (VALISONE) 0.1 % cream Apply 1 application topically as needed for irritation (eczema).     • calcium carbonate-vitamin D3 600 mg-10 mcg (400 unit) capsule Take 1 tablet by mouth  2 (two) times a day.     • fluorometholone (FML) 0.1 % ophthalmic suspension Administer 1 drop into the left eye 2 (two) times a day.     • ibandronate (BONIVA) 150 mg tablet TAKE 1 TABLET EVERY 30 DAYS. TAKE IN THE MORNING WITH GLASS OF WATER PRIOR TO FOOD, DO NOT LIE DOWN FOR 30 MINUTES. 3 tablet 2   • levothyroxine (SYNTHROID) 75 mcg tablet TAKE ONE TABLET BY MOUTH ONE TIME DAILY 90 tablet 0   • raloxifene (EVISTA) 60 mg tablet TAKE 1 TABLET ONCE DAILY 90 tablet 0   • rosuvastatin (CRESTOR) 20 mg tablet TAKE ONE TABLET BY MOUTH DAILY 90 tablet 1   • valACYclovir (VALTREX) 1 gram tablet Take 1,000 mg by mouth daily. To prevent herpes infection in her eyes   Because she is on fml eye drops       No current facility-administered medications for this visit.     Past Medical History:   Diagnosis Date   • Atopic keratoconjunctivitis    • Corneal ulcer of both eyes    • Femur fracture (CMS/[facility])    • Hypothyroidism    • Polymyalgia rheumatica (CMS/[facility])    • S/P knee replacement 2012   • Shoulder fracture, left      Past Surgical History:   Procedure Laterality Date   • ADENOIDECTOMY     • CATARACT EXTRACTION     • JOINT REPLACEMENT     • KERATOPLASTY     • TONSILLECTOMY       Family History   Problem Relation Age of Onset   • Diabetes Biological Brother    • Lung cancer Biological Brother      Social History     Socioeconomic History   • Marital status:      Spouse name: Not on file   • Number of children: Not on file   • Years of education: Not on file   • Highest education level: Not on file   Occupational History   • Not on file   Tobacco Use   • Smoking status: Never   • Smokeless tobacco: Never   Substance and Sexual Activity   • Alcohol use: Yes     Comment: social - maybe 3-4 drinks/week   • Drug use: No   • Sexual activity: Defer   Other Topics Concern   • Not on file   Social History Narrative    Lives at [facility] - independent apartment      Independent with ADLS    Walk with walker only at nights  "    Social Determinants of Health     Financial Resource Strain: Not on file   Food Insecurity: No Food Insecurity   • Worried About Running Out of Food in the Last Year: Never true   • Ran Out of Food in the Last Year: Never true   Transportation Needs: Not on file   Physical Activity: Not on file   Stress: Not on file   Social Connections: Not on file   Intimate Partner Violence: Not on file   Housing Stability: Not on file         Objective     Physical Exam  Constitutional:       Appearance: She is well-developed and well-nourished.   HENT:      Head: Normocephalic and atraumatic.      Right Ear: Tympanic membrane and external ear normal.      Left Ear: Tympanic membrane and external ear normal.      Nose: Nose normal.      Mouth/Throat:      Mouth: Oropharynx is clear and moist.      Pharynx: No oropharyngeal exudate.   Eyes:      Conjunctiva/sclera: Conjunctivae normal.   Cardiovascular:      Rate and Rhythm: Normal rate and regular rhythm.      Pulses: Intact distal pulses.      Heart sounds: Normal heart sounds. No murmur heard.    No friction rub. No gallop.   Pulmonary:      Effort: Pulmonary effort is normal. No respiratory distress.      Breath sounds: Normal breath sounds. No wheezing or rales.   Abdominal:      General: Bowel sounds are normal. There is no distension.      Palpations: Abdomen is soft. There is no mass.      Tenderness: There is no abdominal tenderness. There is no guarding.   Musculoskeletal:         General: Normal range of motion.      Cervical back: Normal range of motion and neck supple.   Lymphadenopathy:      Cervical: No cervical adenopathy.   Skin:     Findings: No erythema or rash.   Neurological:      Mental Status: She is alert and oriented to person, place, and time.      Cranial Nerves: No cranial nerve deficit.         Visit Vitals  /82 (BP Location: Left upper arm, Patient Position: Sitting)   Pulse 84   Temp 36.4 °C (97.6 °F) (Oral)   Ht 1.575 m (5' 2\")   Wt 63 kg " (139 lb)   SpO2 99%   BMI 25.42 kg/m²     Lab Results   Component Value Date    WBC 6.78 12/22/2022    HGB 14.3 12/22/2022    HCT 42.2 12/22/2022    .4 (H) 12/22/2022     12/22/2022     Lab Results   Component Value Date    GLUCOSE 145 (H) 12/22/2022    CALCIUM 9.0 12/22/2022     12/22/2022    K 3.9 12/22/2022    CO2 22 12/22/2022     12/22/2022    BUN 16 12/22/2022    CREATININE 0.8 12/22/2022       Assessment/Plan   Problem List Items Addressed This Visit        Nervous    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 Pre-op evaluation  Patient's PMH, PSH, medication list, allergies, and prior anesthesia history reviewed. PT denies any previous issues with anesthesia.  No known history of sleep apnea. Reviewed EKG and cardiology eval.  No contraindications to proposed surgery.     2. Polymyalgia rheumatica (CMS/HCC)  Has not required steroids recently.    3. Chronic kidney disease, stage 3a (CMS/HCC)  Avoid nephrotoxins    4. Nonrheumatic aortic valve insufficiency  Continue cardiology f/u     5. 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 She reports only some residual knee pain. On ASA per neurology.    6. Hypothyroidism, unspecified type  Continue levothyroxine.    YFN Haas

## 2023-01-10 NOTE — TELEPHONE ENCOUNTER
Please call [facility] for PT   She is having cornea transplant surgery with Dr Sykes  Please ask Does she Need to stop her Aspirin prior to surgery?   Phone number is [phone]

## 2023-01-11 NOTE — TELEPHONE ENCOUNTER
Dr. Sykes's office calling back. Dr. Sykes would like Pt to stop ASA 3 days prior to surgery if PCP is ok with that.

## 2023-02-01 RX ORDER — LEVOTHYROXINE SODIUM 75 UG/1
TABLET ORAL
Qty: 90 TABLET | Refills: 0 | Status: SHIPPED | OUTPATIENT
Start: 2023-02-01 | End: 2023-05-15

## 2023-04-03 ENCOUNTER — OFFICE VISIT (OUTPATIENT)
Dept: INTERNAL MEDICINE | Facility: CLINIC | Age: 88
End: 2023-04-03
Payer: MEDICARE

## 2023-04-03 VITALS
TEMPERATURE: 97.6 F | HEART RATE: 67 BPM | DIASTOLIC BLOOD PRESSURE: 80 MMHG | OXYGEN SATURATION: 97 % | WEIGHT: 134.6 LBS | SYSTOLIC BLOOD PRESSURE: 110 MMHG | BODY MASS INDEX: 24.62 KG/M2

## 2023-04-03 DIAGNOSIS — R20.0 NUMBNESS AND TINGLING OF LEFT LOWER EXTREMITY: ICD-10-CM

## 2023-04-03 DIAGNOSIS — R20.2 NUMBNESS AND TINGLING OF LEFT LOWER EXTREMITY: ICD-10-CM

## 2023-04-03 DIAGNOSIS — R21 RASH: Primary | ICD-10-CM

## 2023-04-03 DIAGNOSIS — R73.9 HYPERGLYCEMIA: ICD-10-CM

## 2023-04-03 DIAGNOSIS — H61.21 IMPACTED CERUMEN OF RIGHT EAR: ICD-10-CM

## 2023-04-03 LAB
EST. AVERAGE GLUCOSE BLD GHB EST-MCNC: 120 MG/DL
FOLATE SERPL-MCNC: >20 NG/ML
HBA1C MFR BLD HPLC: 5.8 %
VIT B12 SERPL-MCNC: 1413 PG/ML (ref 180–914)

## 2023-04-03 PROCEDURE — 99214 OFFICE O/P EST MOD 30 MIN: CPT | Mod: 25 | Performed by: INTERNAL MEDICINE

## 2023-04-03 PROCEDURE — 69210 REMOVE IMPACTED EAR WAX UNI: CPT | Performed by: INTERNAL MEDICINE

## 2023-04-03 PROCEDURE — 83036 HEMOGLOBIN GLYCOSYLATED A1C: CPT | Performed by: INTERNAL MEDICINE

## 2023-04-03 PROCEDURE — 82607 VITAMIN B-12: CPT | Performed by: INTERNAL MEDICINE

## 2023-04-03 PROCEDURE — 82746 ASSAY OF FOLIC ACID SERUM: CPT | Performed by: INTERNAL MEDICINE

## 2023-04-03 RX ORDER — BETAMETHASONE VALERATE 1 MG/G
1 CREAM TOPICAL AS NEEDED
Qty: 45 G | Refills: 1 | Status: SHIPPED | OUTPATIENT
Start: 2023-04-03 | End: 2024-02-06 | Stop reason: SDUPTHER

## 2023-04-03 ASSESSMENT — ENCOUNTER SYMPTOMS
CARDIOVASCULAR NEGATIVE: 1
ENDOCRINE NEGATIVE: 1
CONSTITUTIONAL NEGATIVE: 1
ROS SKIN COMMENTS: SEE HPI
MUSCULOSKELETAL NEGATIVE: 1
GASTROINTESTINAL NEGATIVE: 1

## 2023-04-03 NOTE — PROGRESS NOTES
Patient is 89 years old female came for routine follow-up.  Patient wears bilateral hearing aid.  She felt she has wax buildup and would like for me to take a look at it.  Patient denies any tinnitus, dizziness, ear pain or discharge      Patient has a skin lesion on the right side of the nose.  Symptoms started 2 weeks ago.  It is nontender, nonpruritic.  There is no change in size, shape, or color.  Patient does not have past medical history of skin cancer that she is aware of.  Patient used to follow-up with dermatologist in [place].  She has not seen by the dermatologist in past 4 years after she moved into [facility].    Patient complaining of tingling sensation in bilateral lower extremities.  Symptoms started a few years ago.  She has been taking sublingual vitamin B12 without symptoms improvement.  It is mild to moderate described more as irritating.  Denies any numbness or burning sensation.    Patient had corneal abrasion.  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  Vision has improved since.       Past Medical History:   Diagnosis Date   • Atopic keratoconjunctivitis    • Corneal ulcer of both eyes    • Femur fracture (CMS/[facility])    • Hypothyroidism    • Polymyalgia rheumatica (CMS/[facility])    • S/P knee replacement 2012   • Shoulder fracture, left        Current Outpatient Medications   Medication Sig Dispense Refill   • artificial tears, dextran-hpm-glyc, (GENTEAL TEARS) 0.1-0.3-0.2 % drops eye drops Administer 1 drop into both eyes 3 (three) times a day as needed (dry eyes).     • aspirin 81 mg enteric coated tablet Take 1 tablet (81 mg total) by mouth daily. 30 tablet 0   • bacitracin-polymyxin B (POLYSPORIN) ophthalmic ointment Apply 1 application to both eyes nightly.     • betamethasone valerate (VALISONE) 0.1 % cream Apply 1 application topically as needed for irritation (eczema).     • calcium carbonate-vitamin D3 600 mg-10 mcg (400 unit) capsule Take 1 tablet by mouth 2 (two) times a  day.     • fluorometholone (FML) 0.1 % ophthalmic suspension Administer 1 drop into the left eye 2 (two) times a day.     • ibandronate (BONIVA) 150 mg tablet TAKE 1 TABLET EVERY 30 DAYS. TAKE IN THE MORNING WITH GLASS OF WATER PRIOR TO FOOD, DO NOT LIE DOWN FOR 30 MINUTES. 3 tablet 2   • levothyroxine (SYNTHROID) 75 mcg tablet TAKE ONE TABLET BY MOUTH ONE TIME DAILY 90 tablet 0   • raloxifene (EVISTA) 60 mg tablet TAKE 1 TABLET ONCE DAILY 90 tablet 0   • rosuvastatin (CRESTOR) 20 mg tablet TAKE ONE TABLET BY MOUTH DAILY 90 tablet 1   • valACYclovir (VALTREX) 1 gram tablet Take 1,000 mg by mouth daily. To prevent herpes infection in her eyes   Because she is on fml eye drops       No current facility-administered medications for this visit.       Review of Systems   Constitutional: Negative.    HENT: Positive for hearing loss (both ears. ).    Eyes:        Had left eye cornea transplant in February, 2023 with improve vision   Cardiovascular: Negative.    Gastrointestinal: Negative.    Endocrine: Negative.    Musculoskeletal: Negative.    Skin:        See HPI        Vitals:    04/03/23 0845   BP: 110/80   BP Location: Left upper arm   Patient Position: Sitting   Pulse: 67   Temp: 36.4 °C (97.6 °F)   TempSrc: Oral   SpO2: 97%   Weight: 61.1 kg (134 lb 9.6 oz)       Body mass index is 24.62 kg/m².    Physical Exam  Vitals reviewed.   HENT:      Head: Normocephalic.      Right Ear: There is impacted cerumen.      Left Ear: External ear normal.      Ears:      Comments: Wears hearing aid , bilaterally  Eyes:      Conjunctiva/sclera: Conjunctivae normal.      Pupils: Pupils are equal, round, and reactive to light.   Cardiovascular:      Rate and Rhythm: Normal rate and regular rhythm.      Pulses: Normal pulses.   Pulmonary:      Effort: Pulmonary effort is normal.      Breath sounds: Normal breath sounds.   Musculoskeletal:      Cervical back: Normal range of motion.      Comments: Uses rolling walker   Skin:      Comments: Right side of the nose has pink color rash present. Side is 6 x 7 mm. Shape is irregular. Non tender, blanching, Margins are conlfuent   Neurological:      Mental Status: She is alert and oriented to person, place, and time.   Psychiatric:         Behavior: Behavior normal.        1. Rash    Non malignant looking lesion. To use Valisone as directed. To call if symptoms  Persist.  - betamethasone valerate (VALISONE) 0.1 % cream; Apply 1 application. topically as needed for irritation (eczema).  Dispense: 45 g; Refill: 1    2. Numbness and tingling of left lower extremity    Patient previous B12 level lis low normal around 300. She is  Taking sublingual B12 per patient . Will check level is to see if she is  aborbing adequately.    - VITAMIN B12/FOLATE, SERUM PANEL    3. Hyperglycemia    She had Hb A1C of 5.7 in the past. Will repeat the test,Inlight of above complaint.  - Hemoglobin A1c    4. Ceruminous adenocarcinoma of right ear  Post  irrigation, Tympanic membrane is unremarkable.    To follow up in 6 months or as needed.    Bharath Sinha MD

## 2023-04-03 NOTE — PROCEDURES
Ear cerumen removal    Date/Time: 4/3/2023 9:29 AM    Performed by: Bharath Sinha MD  Authorized by: Bharath Sinha MD    Consent:     Consent obtained:  Verbal    Consent given by:  Patient    Risks discussed:  Bleeding, infection, pain, TM perforation, incomplete removal and dizziness  Universal protocol:     Procedure explained and questions answered to patient or proxy's satisfaction: yes      Relevant documents present and verified: yes      Test results available: yes      Imaging studies available: yes      Required blood products, implants, devices, and special equipment available: yes      Site/side marked: yes      Immediately prior to procedure, a time out was called: yes      Patient identity confirmed:  Verbally with patient  Procedure details:     Location:  R ear    Procedure type: irrigation      Procedure outcomes: cerumen removed    Post-procedure details:     Inspection:  TM intact and ear canal clear    Procedure completion:  Tolerated

## 2023-04-04 ENCOUNTER — TELEPHONE (OUTPATIENT)
Dept: INTERNAL MEDICINE | Facility: CLINIC | Age: 88
End: 2023-04-04
Payer: MEDICARE

## 2023-04-04 NOTE — TELEPHONE ENCOUNTER
Please inform the patient diabetic test, hemoglobin A1c, B12 and folate acid levels are within normal limit.

## 2023-05-02 RX ORDER — IBANDRONATE SODIUM 150 MG/1
TABLET, FILM COATED ORAL
Qty: 3 TABLET | Refills: 2 | Status: SHIPPED | OUTPATIENT
Start: 2023-05-02 | End: 2024-02-20 | Stop reason: SDUPTHER

## 2023-05-02 RX ORDER — RALOXIFENE HYDROCHLORIDE 60 MG/1
TABLET, FILM COATED ORAL
Qty: 90 TABLET | Refills: 0 | Status: SHIPPED | OUTPATIENT
Start: 2023-05-02 | End: 2023-08-16

## 2023-05-05 RX ORDER — ROSUVASTATIN CALCIUM 20 MG/1
20 TABLET, COATED ORAL DAILY
Qty: 90 TABLET | Refills: 1 | Status: SHIPPED | OUTPATIENT
Start: 2023-05-05 | End: 2023-07-20 | Stop reason: SDUPTHER

## 2023-05-05 NOTE — TELEPHONE ENCOUNTER
[facility] at [facility] Refill Request      MA Notes:      Nurse Notes: Pt called in to request refill of her rosuvastatin 20 mg daily be sent to [facility] pharmacy.   Thanks.      Last Office Visit: 12/8/2022  Last Telemedicine Visit: Visit date not found    Next Office Visit: 6/15/2023  Next Telemedicine Visit: Visit date not found     Most Recent BP Readings:  BP Readings from Last 3 Encounters:   04/03/23 110/80   01/10/23 126/82   12/08/22 124/68       Most recent Lab results:  Lab Results   Component Value Date    CHOL 186 03/24/2022    HDL 59 03/24/2022    TRIG 57 03/24/2022    NONHDLCALC 127 03/24/2022       Lab Results   Component Value Date    AST 30 12/22/2022    ALT 17 12/22/2022       Lab Results   Component Value Date     12/22/2022    K 3.9 12/22/2022    BUN 16 12/22/2022    CREATININE 0.8 12/22/2022       Current Medications:    Current Outpatient Medications:   •  ibandronate (BONIVA) 150 mg tablet, TAKE 1 TABLET EVERY 30 DAYS. TAKE IN THE MORNING WITH GLASS OF WATER PRIOR TO FOOD, DO NOT LIE DOWN FOR 30 MINUTES., Disp: 3 tablet, Rfl: 2  •  raloxifene (EVISTA) 60 mg tablet, TAKE 1 TABLET ONCE DAILY, Disp: 90 tablet, Rfl: 0  •  artificial tears, dextran-hpm-glyc, (GENTEAL TEARS) 0.1-0.3-0.2 % drops eye drops, Administer 1 drop into both eyes 3 (three) times a day as needed (dry eyes)., Disp: , Rfl:   •  aspirin 81 mg enteric coated tablet, Take 1 tablet (81 mg total) by mouth daily., Disp: 30 tablet, Rfl: 0  •  bacitracin-polymyxin B (POLYSPORIN) ophthalmic ointment, Apply 1 application to both eyes nightly., Disp: , Rfl:   •  betamethasone valerate (VALISONE) 0.1 % cream, Apply 1 application. topically as needed for irritation (eczema)., Disp: 45 g, Rfl: 1  •  calcium carbonate-vitamin D3 600 mg-10 mcg (400 unit) capsule, Take 1 tablet by mouth 2 (two) times a day., Disp: , Rfl:   •  fluorometholone (FML) 0.1 % ophthalmic suspension, Administer 1 drop into the left eye 2 (two)  times a day., Disp: , Rfl:   •  levothyroxine (SYNTHROID) 75 mcg tablet, TAKE ONE TABLET BY MOUTH ONE TIME DAILY, Disp: 90 tablet, Rfl: 0  •  rosuvastatin (CRESTOR) 20 mg tablet, TAKE ONE TABLET BY MOUTH DAILY, Disp: 90 tablet, Rfl: 1  •  valACYclovir (VALTREX) 1 gram tablet, Take 1,000 mg by mouth daily. To prevent herpes infection in her eyes  Because she is on fml eye drops, Disp: , Rfl:

## 2023-05-15 RX ORDER — LEVOTHYROXINE SODIUM 75 UG/1
TABLET ORAL
Qty: 90 TABLET | Refills: 0 | Status: SHIPPED | OUTPATIENT
Start: 2023-05-15 | End: 2023-11-13

## 2023-05-15 RX ORDER — LEVOTHYROXINE SODIUM 75 UG/1
75 TABLET ORAL DAILY
Qty: 90 TABLET | Refills: 3 | Status: SHIPPED | OUTPATIENT
Start: 2023-05-15 | End: 2023-07-20 | Stop reason: ALTCHOICE

## 2023-07-19 NOTE — PROGRESS NOTES
Cardiology  Office Progress Note         Reason for visit:   Chief Complaint   Patient presents with   • Follow-up       HPI     Catia Ward is a 89 y.o. female who presents to the office for cardiovascular follow up and management of AI and CVA s/p ILR 3/25/22 with no afib to date.     She was seen last in the office on 12/8/22 for preoperative evaluation prior to a corneal transplant. She denied any cardiac complaints.     I cleared her for surgery. Follow up in 6 months.     She underwent left eye cornea transplant in 2/2023.    She saw her PCP office on 4/3/23. She reported improved vision post procedure. /80.     Labs 4/3/23: A1C 5.8    Today she reports that she fell in June when she was in [place] and broke two ribs. She was evaluated in the ED and given lidocaine patches, she is doing better now. She has been taking her ASA and Crestor, but is interested in lowering her Crestor dose due to it causing stiffness.     She denies any chest pain, shortness of breath, palpitations, lightheadedness, dizziness, or syncopal events. She notes some ankle edema by the end of the day, better in the morning. She has some tingling in her feet and feels off balance due to her vision. She ambulates with a walker. She does some balance exercises at home a few times a week.     She has lost 5 lbs since her last visit. She has cut back on sweets. She tries to stay well hydrated. She continues to live at [facility].     Past Medical History:   Diagnosis Date   • Atopic keratoconjunctivitis    • Corneal ulcer of both eyes    • Femur fracture (CMS/HCC)    • Hypothyroidism    • Polymyalgia rheumatica (CMS/HCC)    • Rib fractures 06/2023   • S/P knee replacement 2012   • Shoulder fracture, left        Past Surgical History:   Procedure Laterality Date   • ADENOIDECTOMY     • CATARACT EXTRACTION     • CORNEAL TRANSPLANT Left     Feburary,2023   • JOINT REPLACEMENT     • KERATOPLASTY     • TONSILLECTOMY         Social  History     Tobacco Use   • Smoking status: Never   • Smokeless tobacco: Never   Vaping Use   • Vaping Use: Never used   Substance Use Topics   • Alcohol use: Yes     Comment: social - maybe 3-4 drinks/week   • Drug use: No       Family History   Problem Relation Age of Onset   • Diabetes Biological Brother    • Lung cancer Biological Brother        Allergies:  Codeine, Gentamicin, and Gentamicin sulfate    Current Outpatient Medications   Medication Sig Dispense Refill   • aspirin 81 mg enteric coated tablet Take 1 tablet (81 mg total) by mouth daily. 30 tablet 0   • betamethasone valerate (VALISONE) 0.1 % cream Apply 1 application. topically as needed for irritation (eczema). 45 g 1   • calcium carbonate-vitamin D3 600 mg-10 mcg (400 unit) capsule Take 1 tablet by mouth 2 (two) times a day.     • ibandronate (BONIVA) 150 mg tablet TAKE 1 TABLET EVERY 30 DAYS. TAKE IN THE MORNING WITH GLASS OF WATER PRIOR TO FOOD, DO NOT LIE DOWN FOR 30 MINUTES. 3 tablet 2   • levothyroxine (SYNTHROID) 75 mcg tablet TAKE ONE TABLET BY MOUTH ONE TIME DAILY 90 tablet 0   • prednisoLONE acetate (PRED FORTE) 1 % ophthalmic suspension Administer 1 drop into both eyes 4 (four) times a day. ** Shake well before administration **     • raloxifene (EVISTA) 60 mg tablet TAKE 1 TABLET ONCE DAILY 90 tablet 0   • rosuvastatin (CRESTOR) 10 mg tablet Take 1 tablet (10 mg total) by mouth daily. 90 tablet 3   • valACYclovir (VALTREX) 1 gram tablet Take 1,000 mg by mouth daily. To prevent herpes infection in her eyes   Because she is on fml eye drops       No current facility-administered medications for this visit.       Review of Systems   Constitutional: Positive for weight loss (-5 lbs since 12/8/22). Negative for fever and malaise/fatigue.   Eyes: Positive for blurred vision.   Cardiovascular: Negative for chest pain, dyspnea on exertion, irregular heartbeat, leg swelling, near-syncope, palpitations and syncope.   Respiratory: Negative for  shortness of breath.    Hematologic/Lymphatic: Does not bruise/bleed easily.   Musculoskeletal: Positive for falls.   Neurological: Positive for loss of balance. Negative for dizziness and light-headedness.       Objective     Vitals:    07/20/23 1140   BP: 116/64   Pulse: 63   SpO2: 96%       Wt Readings from Last 5 Encounters:   07/20/23 60.8 kg (134 lb)   04/03/23 61.1 kg (134 lb 9.6 oz)   01/10/23 63 kg (139 lb)   12/08/22 63 kg (139 lb)   12/08/22 63 kg (139 lb)       Body mass index is 24.51 kg/m².    Physical Exam  Vitals and nursing note reviewed.   Constitutional:       Appearance: Normal appearance.   Cardiovascular:      Rate and Rhythm: Regular rhythm. Bradycardia present.      Pulses: Normal pulses.           Radial pulses are 2+ on the right side and 2+ on the left side.      Heart sounds: Normal heart sounds.   Pulmonary:      Effort: Pulmonary effort is normal.      Breath sounds: Normal breath sounds.   Musculoskeletal:         General: Normal range of motion.      Right lower leg: No edema.      Left lower leg: No edema.   Skin:     General: Skin is warm and dry.   Neurological:      General: No focal deficit present.      Mental Status: She is alert. Mental status is at baseline.   Psychiatric:         Mood and Affect: Mood normal.         Thought Content: Thought content normal.         Judgment: Judgment normal.       ECG   Sinus bradycardia  Poor R wave progression         Hematology  Lab Results   Component Value Date    WBC 6.78 12/22/2022    HGB 14.3 12/22/2022    HCT 42.2 12/22/2022     12/22/2022    INR 1.0 03/23/2022       Chemistries  Lab Results   Component Value Date     12/22/2022    K 3.9 12/22/2022     12/22/2022    CREATININE 0.8 12/22/2022    BUN 16 12/22/2022    CO2 22 12/22/2022    GLUCOSE 145 (H) 12/22/2022    CALCIUM 9.0 12/22/2022    ALT 17 12/22/2022    AST 30 12/22/2022       Cholesterol  Lab Results   Component Value Date    CHOL 186 03/24/2022    TRIG  57 03/24/2022    HDL 59 03/24/2022    LDLCALC 116 (H) 03/24/2022    NONHDLCALC 127 03/24/2022       Endocrine  Lab Results   Component Value Date    TSH 1.32 07/07/2022    HGBA1C 5.8 (H) 04/03/2023       Cardiac Imaging    TRANSTHORACIC ECHO (TTE) COMPLETE 03/24/2022    Interpretation Summary  · Normal-sized LV. Preserved LV systolic function. Estimated EF 65%. No regional wall motion abnormalities. Normal LV wall thickness. Normal diastolic filling pattern for age of the LV.  · Normal-sized RV. Normal RV systolic function.  · Normal-sized LA. Normal-sized RA.  · Tricuspid aortic valve. Sclerotic aortic valve leaflets. Mild to moderate aortic valve regurgitation. No aortic valve stenosis. Aortic root normal. Ascending aorta normal-sized.  · Mild mitral annular calcification. Mild mitral valve regurgitation. No significant mitral valve stenosis.  · Mild tricuspid valve regurgitation. Estimated RVSP = 26 mmHg.  · Pulmonic valve not well visualized.  · Normal-sized IVC. IVC demonstrates normal respiratory collapse.  · No evidence of pericardial effusion.                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 Will continue to monitor    Nonrheumatic aortic valve insufficiency  Mild-moderate AI on 3/24/2022 TTE  Plan to repeat at 2 years, approximately March 2024  Also mild MR, mild TR    Hyperlipidemia  LDL 42 on 7/7/22  Subjective stiffness. She wants to try lower dose of crestor  Start crestor 10 in place of 20  Recheck lipid panel and f/u 8 mo with TTE for  valvular disease      New Medications Ordered This Visit   Medications   • prednisoLONE acetate (PRED FORTE) 1 % ophthalmic suspension     Sig: Administer 1 drop into both eyes 4 (four) times a day. ** Shake well before administration **   • rosuvastatin (CRESTOR) 10 mg tablet     Sig: Take 1 tablet (10 mg total) by mouth daily.     Dispense:  90 tablet     Refill:  3     This prescription was filled on 7/26/2022. Any refills authorized will be placed on file.       Medications Discontinued During This Encounter   Medication Reason   • bacitracin-polymyxin B (POLYSPORIN) ophthalmic ointment Therapy completed   • fluorometholone (FML) 0.1 % ophthalmic suspension Therapy completed   • artificial tears, dextran-hpm-glyc, (GENTEAL TEARS) 0.1-0.3-0.2 % drops eye drops Therapy completed   • levothyroxine (SYNTHROID) 75 mcg tablet Therapy completed   • rosuvastatin (CRESTOR) 20 mg tablet Reorder       Orders Placed This Encounter   Procedures   • Lipid panel     Standing Status:   Future     Standing Expiration Date:   7/20/2024     Order Specific Question:   Release to patient     Answer:   Immediate   • ECG 12 lead     Scheduling Instructions:      PLEASE USE THIS ORDER FOR ECG'S PERFORMED IN PHYSICIAN OFFICES     Order Specific Question:   Release to patient     Answer:   Immediate   • Transthoracic echo (TTE) complete     Standing Status:   Future     Standing Expiration Date:   7/20/2025     Scheduling Instructions:      To schedule cardiology appointments with [facility], call Central Scheduling at [phone]. Thank you.     Order Specific Question:   Is contrast and/or agitated saline (bubbles) indicated for the study?     Answer:   No     Order Specific Question:   Release to patient     Answer:   Immediate       Follow Up Plans:  Return in about 8 months (around 3/20/2024).          Ashleigh COON, am scribing for, and in the presence of, Xavier Barry MD.    Xavier COON MD, personally performed  the services described in this documentation as scribed by Ashleigh Baugh in my presence, and it is both accurate and complete.       Xavier Barry MD  7/20/2023

## 2023-07-20 ENCOUNTER — OFFICE VISIT (OUTPATIENT)
Dept: CARDIOLOGY | Facility: CLINIC | Age: 88
End: 2023-07-20
Payer: MEDICARE

## 2023-07-20 VITALS
SYSTOLIC BLOOD PRESSURE: 116 MMHG | HEIGHT: 62 IN | WEIGHT: 134 LBS | BODY MASS INDEX: 24.66 KG/M2 | DIASTOLIC BLOOD PRESSURE: 64 MMHG | HEART RATE: 63 BPM | OXYGEN SATURATION: 96 %

## 2023-07-20 DIAGNOSIS — I63.411 CEREBRAL INFARCTION DUE TO EMBOLISM OF RIGHT MIDDLE CEREBRAL ARTERY (CMS/HCC): ICD-10-CM

## 2023-07-20 DIAGNOSIS — I34.0 NONRHEUMATIC MITRAL VALVE REGURGITATION: Primary | ICD-10-CM

## 2023-07-20 DIAGNOSIS — I35.1 NONRHEUMATIC AORTIC VALVE INSUFFICIENCY: ICD-10-CM

## 2023-07-20 DIAGNOSIS — E78.5 HYPERLIPIDEMIA, UNSPECIFIED HYPERLIPIDEMIA TYPE: ICD-10-CM

## 2023-07-20 LAB
ATRIAL RATE: 58
P AXIS: 61
PR INTERVAL: 172
QRS DURATION: 86
QT INTERVAL: 448
QTC CALCULATION(BAZETT): 439
R AXIS: 15
T WAVE AXIS: 44
VENTRICULAR RATE: 58

## 2023-07-20 PROCEDURE — 93000 ELECTROCARDIOGRAM COMPLETE: CPT | Performed by: INTERNAL MEDICINE

## 2023-07-20 PROCEDURE — 99214 OFFICE O/P EST MOD 30 MIN: CPT | Performed by: INTERNAL MEDICINE

## 2023-07-20 RX ORDER — ROSUVASTATIN CALCIUM 10 MG/1
10 TABLET, COATED ORAL DAILY
Qty: 90 TABLET | Refills: 3 | Status: SHIPPED | OUTPATIENT
Start: 2023-07-20 | End: 2024-07-19 | Stop reason: SDUPTHER

## 2023-07-20 RX ORDER — PREDNISOLONE ACETATE 10 MG/ML
1 SUSPENSION/ DROPS OPHTHALMIC 4 TIMES DAILY
COMMUNITY

## 2023-07-20 ASSESSMENT — ENCOUNTER SYMPTOMS
SYNCOPE: 0
LOSS OF BALANCE: 1
BRUISES/BLEEDS EASILY: 0
SHORTNESS OF BREATH: 0
DYSPNEA ON EXERTION: 0
BLURRED VISION: 1
WEIGHT LOSS: 1
FALLS: 1
LIGHT-HEADEDNESS: 0
NEAR-SYNCOPE: 0
IRREGULAR HEARTBEAT: 0
PALPITATIONS: 0
DIZZINESS: 0
FEVER: 0

## 2023-07-20 NOTE — ASSESSMENT & PLAN NOTE
LDL 42 on 7/7/22  Subjective stiffness. She wants to try lower dose of crestor  Start crestor 10 in place of 20  Recheck lipid panel and f/u 8 mo with TTE for valvular disease

## 2023-08-16 RX ORDER — RALOXIFENE HYDROCHLORIDE 60 MG/1
TABLET, FILM COATED ORAL
Qty: 90 TABLET | Refills: 0 | Status: SHIPPED | OUTPATIENT
Start: 2023-08-16 | End: 2023-11-20

## 2023-10-02 ENCOUNTER — OFFICE VISIT (OUTPATIENT)
Dept: INTERNAL MEDICINE | Facility: CLINIC | Age: 88
End: 2023-10-02
Payer: MEDICARE

## 2023-10-02 VITALS
BODY MASS INDEX: 26.5 KG/M2 | HEIGHT: 60 IN | DIASTOLIC BLOOD PRESSURE: 80 MMHG | TEMPERATURE: 97.6 F | SYSTOLIC BLOOD PRESSURE: 130 MMHG | WEIGHT: 135 LBS | HEART RATE: 86 BPM | OXYGEN SATURATION: 94 %

## 2023-10-02 DIAGNOSIS — Z12.31 VISIT FOR SCREENING MAMMOGRAM: ICD-10-CM

## 2023-10-02 DIAGNOSIS — Z23 NEED FOR VACCINATION: ICD-10-CM

## 2023-10-02 DIAGNOSIS — E03.9 HYPOTHYROIDISM, UNSPECIFIED TYPE: ICD-10-CM

## 2023-10-02 DIAGNOSIS — Z13.0 SCREENING FOR DEFICIENCY ANEMIA: ICD-10-CM

## 2023-10-02 DIAGNOSIS — Z00.00 MEDICARE ANNUAL WELLNESS VISIT, SUBSEQUENT: Primary | ICD-10-CM

## 2023-10-02 DIAGNOSIS — M81.0 AGE-RELATED OSTEOPOROSIS WITHOUT CURRENT PATHOLOGICAL FRACTURE: ICD-10-CM

## 2023-10-02 DIAGNOSIS — E55.9 VITAMIN D DEFICIENCY: ICD-10-CM

## 2023-10-02 DIAGNOSIS — L98.9 NON-HEALING SKIN LESION OF NOSE: ICD-10-CM

## 2023-10-02 DIAGNOSIS — E78.2 MIXED HYPERLIPIDEMIA: ICD-10-CM

## 2023-10-02 PROCEDURE — G0439 PPPS, SUBSEQ VISIT: HCPCS | Performed by: INTERNAL MEDICINE

## 2023-10-02 PROCEDURE — 90694 VACC AIIV4 NO PRSRV 0.5ML IM: CPT | Performed by: INTERNAL MEDICINE

## 2023-10-02 PROCEDURE — G0008 ADMIN INFLUENZA VIRUS VAC: HCPCS | Performed by: INTERNAL MEDICINE

## 2023-10-02 ASSESSMENT — ENCOUNTER SYMPTOMS
NAUSEA: 0
FEVER: 0
TROUBLE SWALLOWING: 0
BLOOD IN STOOL: 0
ABDOMINAL DISTENTION: 0
CONFUSION: 0
ACTIVITY CHANGE: 0
NECK PAIN: 0
SPEECH DIFFICULTY: 0
ARTHRALGIAS: 1
BRUISES/BLEEDS EASILY: 0
MYALGIAS: 0
RHINORRHEA: 0
HYPERACTIVE: 0
COUGH: 0
TREMORS: 0
ANAL BLEEDING: 0
FACIAL ASYMMETRY: 0
DECREASED CONCENTRATION: 0
UNEXPECTED WEIGHT CHANGE: 0
DIFFICULTY URINATING: 0
FLANK PAIN: 0
CHILLS: 0
FREQUENCY: 0
SORE THROAT: 0
CHEST TIGHTNESS: 0
FATIGUE: 0
NERVOUS/ANXIOUS: 0
HEADACHES: 0
DIZZINESS: 0
DIARRHEA: 0
PALPITATIONS: 0
SINUS PAIN: 0
POLYDIPSIA: 0
HALLUCINATIONS: 0
SINUS PRESSURE: 0
SEIZURES: 0
AGITATION: 0
SLEEP DISTURBANCE: 0
WHEEZING: 0
NECK STIFFNESS: 0
FACIAL SWELLING: 0
STRIDOR: 0
DIAPHORESIS: 0
NUMBNESS: 1
WEAKNESS: 0
VOMITING: 0
APPETITE CHANGE: 0
ABDOMINAL PAIN: 0
BACK PAIN: 0
VOICE CHANGE: 0
ADENOPATHY: 0
POLYPHAGIA: 0
RECTAL PAIN: 0
SHORTNESS OF BREATH: 0
DYSURIA: 0
CONSTIPATION: 0

## 2023-10-02 ASSESSMENT — MINI COG: TOTAL SCORE: 5

## 2023-10-02 ASSESSMENT — PATIENT HEALTH QUESTIONNAIRE - PHQ9: SUM OF ALL RESPONSES TO PHQ9 QUESTIONS 1 & 2: 0

## 2023-10-02 NOTE — PATIENT INSTRUCTIONS
Your Personalized Prevention Plan Services (PPPS)    Preventive Services Checklist (Assumes Average Risk Unless Otherwise Noted):    Health Maintenance Topics with due status: Overdue       Topic Date Due    Colorectal Cancer Screening Never done    Depression Screening Never done    Falls Risk Screening Never done    Zoster Vaccine 07/21/2017    COVID-19 Vaccine 02/07/2023    DEXA Scan 03/30/2023    Breast Cancer Screening 05/06/2023    Medicare Annual Wellness Visit 07/05/2023    Influenza Vaccine 08/01/2023     Health Maintenance Topics with due status: Not Due       Topic Last Completion Date    DTaP, Tdap, and Td Vaccines 03/08/2016     Health Maintenance Topics with due status: Completed       Topic Last Completion Date    Pneumococcal (65 years and older) 11/18/2015     Health Maintenance Topics with due status: Aged Out       Topic Date Due    Meningococcal ACWY Aged Out    HIB Vaccines Aged Out    Hepatitis B Vaccines Aged Out    IPV Vaccines Aged Out    HPV Vaccines Aged Out       You May Be Eligible for These Additional Preventive Services   (Assumes Average Risk Unless Otherwise Noted)  Diabetes Screening Any 1 risk factor: hypertension, dyslipidemia, obesity, high glucose; or Any 2 risk factors: >=64yo, overweight, family history diabetes (covered every 6 months)   Hepatitis C Screening Any 1 risk factor: 1) blood transfusion before 1992,   2) current or past injection drug use (annually for high risk; if born between 4515-0363, see above for status).   Vaccine: Hepatitis B As necessary if at-risk: hemophilia, ESRD, diabetes, living with individual infected with hep B, healthcare worker with frequent contact with blood/bodily fluids (series covered once)   Sexually Transmitted Diseases (STDs) As necessary chlamydia, gonorrhea, syphilis, hepatitis B (covered annually)  HIV if any 1 risk factor present: 1) <16yo or >64yo and at increased risk or 2) 15-64yo and ask for it (covered  annually)   Lung Cancer Screening Low dose chest CT if all three risk factors: 1) 50-78yo, 2) smoker or quit within last 15y, 3) >=20 pack years (covered annually).  No results found for this or any previous visit.       Cholesterol Screening Both risk factors: 1) >=21yo and 2)  increased risk coronary artery disease (covered every 5 years).     Breast Cancer Screening Covered once 35-38yo, annually >=41yo (if >=51yo, see above for status).         Health Risk Factors with Personalized Education:  ----------------------------------------------------------------------------------------------------------------------  Controlling Your Cholesterol  · Reduce the amount of saturated and trans fat in your diet.  Limit intake of red meat.  Consume only low-fat or non-fat/skim dairy.  Limit fried food.  Cook with vegetable oils.  · Reduce your intake of sugary foods, sugary drinks and alcohol.  · Eat a diet high in fruit, vegetables and whole grains.  · Get protein from fish, poultry and a small portion of nuts.  · Stay active.  Try to get at least 90 to 150 minutes of exercise per week.  Try brisk walking, swimming, bicycling or dancing.  · Maintain a healthy weight by balancing your diet and exercise.  · If you have been prescribed medication, take it regularly and exactly as prescribed. Let your PCP know if you have any problems or questions about your medication.  · Its important to know your cholesterol numbers.  When recommended by your PCP, get the cholesterol blood test.  ---------------------------------------------------------------------------------------------------------------------   Controlling Your Osteoporosis, Strengthening Your Bones  · Try to get at least 90 to 150 minutes of weight-bearing exercise per week.  · Ensure intake of at least 1200mg of calcium per day.  Eat foods high in calcium like milk and other dairy, green vegetables, fruit, canned fish with soft and edible bones, nuts, calcium-set tofu.   Some foods are calcium-fortified, like bread, cereal, fruit juices and mineral water.  · Help your body make vitamin D by getting 10-15 minutes per day of sunlight.    · Ensure intake of at least 600IU of vitamin D per day.  Eat foods high in vitamin D like oily fish (salmon, sardines, mackerel) and eggs.  Some foods are fortified with vitamin D, like dairy and cereals.  · Avoid high amounts of caffeine and salt, since they can cause the body to loose calcium.  · Limit alcohol intake, since it is associated with weaker bones and is associated with falls and fractures.  · Limit intake of fizzy drinks.  · If you have been prescribed medication, take it regularly and exactly as prescribed.  Let your PCP know if you have any problems or questions about your medication  ----------------------------------------------------------------------------------------------------------------------  Reducing Your Risk of Falls  · Tell your PCP if any of your medications make you feel tired, dizzy, lightheaded or off-balance.  · Maintain coordination, flexibility and balance by ensuring regular physical activity.  · Limit alcohol intake to 1 drink per day.  Consider avoiding all alcohol intake.  · Ensure good vision.  Visit an ophthalmologist or optometrist regularly for vision screening or to make sure your glasses / contact lens prescription is correct.  If you need glasses or contacts, wear them.  When you get new glasses or contacts, take time to get used to them.  Do not wear sunglasses or tinted lenses when indoors.  · Ensure good hearing.  Have your hearing checked if you are having trouble hearing, or family and friends think you cannot hear them.  If you need a hearing aid, be sure it fits well and wear it.  · Get enough rest.  Ensure about 7-9 hours of sleep every day.  · Get up slowly from your bed or chairs.  Do not start walking until you are sure you feel steady.  · Wear non-skid, rubber-soled, low-heeled shoes.  Do  not walk in socks, or in shoes and slippers with smooth soles.  · If your PCP or therapist recommends using a cane or walker, use it regularly.  · Make your home safer.  Increase lighting throughout the house, especially at the top and bottom of stairs.  Ensure lighting is easily turned on when getting up in the middle of the night.  Make sure there are two secure rails on all stairs.  Install grab bars in the bathtub / shower and near the toilet.  Consider using a shower chair and / or a hand-held shower.  · Spread sand or salt on icy surfaces.  Beware of wet surfaces, which can be icy.  · Tell your PCP if you have fallen.

## 2023-10-02 NOTE — PROGRESS NOTES
Subjective     Catia Ward is a 89 y.o. female who presents for a subsequent annual wellness visit.     Patient Care Team:  Bharath Sinha MD as PCP - General (Internal Medicine)  Enid Erwni MD as Consulting Physician (Neurology)  Xavier Barry MD as Consulting Physician (Cardiovascular Disease)  Kinsey Michaels, RN as Care Manager (Care Management)    Comprehensive Medical and Social History  Patient Active Problem List   Diagnosis    Atopic keratoconjunctivitis    Cicatricial entropion of both upper eyelids    Cicatricial trichiasis    Cornea ulcer, left    Cystoid macular edema, right    Epiretinal membrane, right eye    Keratoconus of both eyes    Pseudophakia, both eyes    PVD (posterior vitreous detachment), right eye    Wedging of vertebra (CMS/HCC)    Compression fracture of body of thoracic vertebra (CMS/HCC)    Age-related osteoporosis with current pathological fracture    Hypothyroidism    Chronic kidney disease, stage 3a (CMS/HCC)    Transient left leg weakness    Osteoporosis    Nonrheumatic mitral valve regurgitation    Abnormal ECG    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)    Cryptogenic stroke (CMS/HCC)    Polymyalgia rheumatica (CMS/HCC)    Nonrheumatic aortic valve insufficiency    Preop cardiovascular exam    Hyperlipidemia     Past Medical History:   Diagnosis Date    Atopic keratoconjunctivitis     Corneal ulcer of both eyes     Femur fracture (CMS/HCC)     Hypothyroidism     Polymyalgia rheumatica (CMS/HCC)     Rib fractures 06/2023    S/P knee replacement 2012    Shoulder fracture, left      Past Surgical History:   Procedure Laterality Date    ADENOIDECTOMY      CATARACT EXTRACTION      CORNEAL TRANSPLANT Left     Feburary,2023    JOINT REPLACEMENT      KERATOPLASTY      TONSILLECTOMY       Allergies   Allergen Reactions    Codeine Nausea And Vomiting    Gentamicin GI intolerance    Gentamicin Sulfate  "Other (see comments)     Eye redness     Current Outpatient Medications   Medication Sig Dispense Refill    aspirin 81 mg enteric coated tablet Take 1 tablet (81 mg total) by mouth daily. 30 tablet 0    betamethasone valerate (VALISONE) 0.1 % cream Apply 1 application. topically as needed for irritation (eczema). 45 g 1    ibandronate (BONIVA) 150 mg tablet TAKE 1 TABLET EVERY 30 DAYS. TAKE IN THE MORNING WITH GLASS OF WATER PRIOR TO FOOD, DO NOT LIE DOWN FOR 30 MINUTES. 3 tablet 2    levothyroxine (SYNTHROID) 75 mcg tablet TAKE ONE TABLET BY MOUTH ONE TIME DAILY 90 tablet 0    prednisoLONE acetate (PRED FORTE) 1 % ophthalmic suspension Administer 1 drop into both eyes 4 (four) times a day. ** Shake well before administration **      raloxifene (EVISTA) 60 mg tablet TAKE 1 TABLET ONCE DAILY 90 tablet 0    rosuvastatin (CRESTOR) 10 mg tablet Take 1 tablet (10 mg total) by mouth daily. 90 tablet 3    valACYclovir (VALTREX) 1 gram tablet Take 1,000 mg by mouth daily. To prevent herpes infection in her eyes   Because she is on fml eye drops      calcium carbonate-vitamin D3 600 mg-10 mcg (400 unit) capsule Take 1 tablet by mouth 2 (two) times a day.       No current facility-administered medications for this visit.     Social History     Tobacco Use    Smoking status: Never    Smokeless tobacco: Never   Vaping Use    Vaping Use: Never used   Substance Use Topics    Alcohol use: Yes     Comment: social - maybe 3-4 drinks/week    Drug use: No     Family History   Problem Relation Age of Onset    Diabetes Biological Brother     Lung cancer Biological Brother        Objective   Vitals  Vitals:    10/02/23 0920   Weight: 61.2 kg (135 lb)   Height: 1.53 m (5' 0.24\")     Body mass index is 26.16 kg/m².    Advanced Care Plan  Does patient have advance directive?: No                                     PHQ  Will the patient answer the depression questions?: Yes   Little interest or pleasure in doing things: Not " at all   Feeling down, depressed, or hopeless: Not at all   Depression Risk: 0                                             Mini Cog         Get Up and Go       STEADI Falls Risk                                                                Hearing and Vision Screening  No results found.  See HRA for relevant hearing screening response.      Subjective     Patient ID: Catia Ward is a 89 y.o. female.    89 years old femal came for medicare wellness check up.      Review of Systems   Constitutional: Negative for activity change, appetite change, chills, diaphoresis, fatigue, fever and unexpected weight change.   HENT: Negative for congestion, dental problem, drooling, ear discharge, ear pain, facial swelling, hearing loss (Wears hearing aid), mouth sores, nosebleeds, postnasal drip, rhinorrhea, sinus pressure, sinus pain, sneezing, sore throat, tinnitus, trouble swallowing and voice change.    Eyes:        S/p cornea transplant. Follows up with ophthalmologist  monthly   Respiratory: Negative for cough, chest tightness, shortness of breath, wheezing and stridor.    Cardiovascular: Negative for chest pain, palpitations and leg swelling.   Gastrointestinal: Negative for abdominal distention, abdominal pain, anal bleeding, blood in stool, constipation, diarrhea, nausea, rectal pain and vomiting.   Endocrine: Negative for cold intolerance, heat intolerance, polydipsia, polyphagia and polyuria.   Genitourinary: Negative for decreased urine volume, difficulty urinating, dysuria, flank pain, frequency and urgency.   Musculoskeletal: Positive for arthralgias and gait problem (Uses rolling walker). Negative for back pain, myalgias, neck pain and neck stiffness.   Skin:        Skin lesion on right side of the nose. No change in size, shape or color.   Allergic/Immunologic: Negative for environmental allergies, food allergies and immunocompromised state.   Neurological: Positive for numbness (On going. B12 was unremarkable .  Podiatist  diagnosed with Neuropathy). Negative for dizziness, tremors, seizures, syncope, facial asymmetry, speech difficulty, weakness and headaches.   Hematological: Negative for adenopathy. Does not bruise/bleed easily.   Psychiatric/Behavioral: Negative for agitation, behavioral problems, confusion, decreased concentration, hallucinations, self-injury, sleep disturbance and suicidal ideas. The patient is not nervous/anxious and is not hyperactive.        Objective     Vitals:    10/02/23 0920   BP: 130/80   Pulse: 86   Temp: 36.4 °C (97.6 °F)   SpO2: 94%     52.12 kg,  Body mass index is 26.16 kg/m².      Body mass index is 26.16 kg/m².    Physical Exam  Nursing note reviewed.   Constitutional:       Appearance: Normal appearance.   HENT:      Head: Normocephalic.      Right Ear: Tympanic membrane normal.      Left Ear: Tympanic membrane normal.      Ears:      Comments: Bilateral hearing aid present     Mouth/Throat:      Mouth: Mucous membranes are moist.   Eyes:      Extraocular Movements: Extraocular movements intact.      Conjunctiva/sclera: Conjunctivae normal.      Pupils: Pupils are equal, round, and reactive to light.   Cardiovascular:      Rate and Rhythm: Normal rate and regular rhythm.      Pulses: Normal pulses.           Dorsalis pedis pulses are 2+ on the right side and 2+ on the left side.        Posterior tibial pulses are 2+ on the left side.   Pulmonary:      Effort: Pulmonary effort is normal.      Breath sounds: Normal breath sounds.   Chest:   Breasts:     Right: No swelling, bleeding, inverted nipple, mass, nipple discharge or skin change.      Left: No swelling, bleeding, inverted nipple, mass, nipple discharge, skin change or tenderness.   Abdominal:      General: Abdomen is flat. Bowel sounds are normal.      Palpations: Abdomen is soft.   Musculoskeletal:         General: No swelling, tenderness or deformity.      Cervical back: Normal range of motion and neck supple.      Right lower  leg: No edema.      Left lower leg: No edema.      Right foot: Normal range of motion. No deformity, Charcot foot, foot drop or prominent metatarsal heads.      Left foot: Normal range of motion. No deformity, Charcot foot, foot drop or prominent metatarsal heads.      Comments: Uses rollator walker.   Feet:      Right foot:      Protective Sensation: 5 sites tested. 5 sites sensed.      Skin integrity: Skin integrity normal. No ulcer, blister, skin breakdown, erythema, warmth or fissure.      Left foot:      Protective Sensation: 5 sites tested. 5 sites sensed.      Skin integrity: Skin integrity normal. No ulcer, blister, skin breakdown, erythema, warmth or fissure.   Lymphadenopathy:      Upper Body:      Right upper body: No supraclavicular, axillary or pectoral adenopathy.      Left upper body: No supraclavicular, axillary or pectoral adenopathy.   Skin:     General: Skin is warm.      Comments: Macular rash present on right side of the nose.  Lesion is pink.  Size is 3 x 4 mm.  Flush with skin.  Nontender.  Vance on pressure.  Margins are confluent.   Neurological:      General: No focal deficit present.      Mental Status: She is alert and oriented to person, place, and time. Mental status is at baseline.      Cranial Nerves: No cranial nerve deficit.      Sensory: No sensory deficit.      Gait: Gait normal.      Deep Tendon Reflexes: Reflexes normal.   Psychiatric:         Mood and Affect: Mood normal.         Behavior: Behavior normal.       1. Medicare annual wellness visit, subsequent    89 years old female with history of hypothyroid, osteoporosis, lower degeneration with status post coronary transplant.  Wellness examination is unremarkable for her age.  Patient is to check below mention blood work.  Discussed about the usefulness of mammogram and bone density test.  Patient would like to have the test done.  Requisition provided.  We will call patient with the results, if further management is  needed.  Patient  is to follow-up in 6 months.    2. Non-healing skin lesion of nose    We will request dermatology consultation for further evaluation and management.  - Ambulatory referral to Dermatology; Future    3. Visit for screening mammogram    - BI SCREENING MAMMOGRAM BILATERAL(TOMOSYNTHESIS); Future    4. Age-related osteoporosis without current pathological fracture    - DEXA BONE DENSITY; Future    5. Need for vaccination    - Influenza vaccine Quad Adjuvanted 65 and Older (FluAd Quad)    6. Mixed hyperlipidemia    - Comprehensive metabolic panel; Future  - Lipid panel; Future    7. Hypothyroidism, unspecified type    - TSH w reflex FT4; Future    8. Vitamin D deficiency    - Vitamin D 25 hydroxy; Future    9. Screening for deficiency anemia    - CBC and Differential; Future    Bharath Sinha MD        See Patient Instructions (the written plan) which was given to the patient for PPPS and health risk factors with interventions.

## 2023-10-13 ENCOUNTER — LAB REQUISITION (OUTPATIENT)
Dept: LAB | Facility: HOSPITAL | Age: 88
End: 2023-10-13
Attending: INTERNAL MEDICINE
Payer: MEDICARE

## 2023-10-13 DIAGNOSIS — E03.9 HYPOTHYROIDISM, UNSPECIFIED: ICD-10-CM

## 2023-10-13 DIAGNOSIS — Z13.0 ENCOUNTER FOR SCREENING FOR DISEASES OF THE BLOOD AND BLOOD-FORMING ORGANS AND CERTAIN DISORDERS INVOLVING THE IMMUNE MECHANISM: ICD-10-CM

## 2023-10-13 DIAGNOSIS — E55.9 VITAMIN D DEFICIENCY, UNSPECIFIED: ICD-10-CM

## 2023-10-13 DIAGNOSIS — E78.2 MIXED HYPERLIPIDEMIA: ICD-10-CM

## 2023-10-13 LAB
25(OH)D3 SERPL-MCNC: 93 NG/ML (ref 30–100)
ALBUMIN SERPL-MCNC: 4.2 G/DL (ref 3.5–5.7)
ALP SERPL-CCNC: 64 IU/L (ref 34–125)
ALT SERPL-CCNC: 15 IU/L (ref 7–52)
ANION GAP SERPL CALC-SCNC: 7 MEQ/L (ref 3–15)
AST SERPL-CCNC: 28 IU/L (ref 13–39)
BASOPHILS # BLD: 0.07 K/UL (ref 0.01–0.1)
BASOPHILS NFR BLD: 1 %
BILIRUB SERPL-MCNC: 1.1 MG/DL (ref 0.3–1.2)
BUN SERPL-MCNC: 21 MG/DL (ref 7–25)
CALCIUM SERPL-MCNC: 9.7 MG/DL (ref 8.6–10.3)
CHLORIDE SERPL-SCNC: 106 MEQ/L (ref 98–107)
CHOLEST SERPL-MCNC: 130 MG/DL
CO2 SERPL-SCNC: 28 MEQ/L (ref 21–31)
CREAT SERPL-MCNC: 0.8 MG/DL (ref 0.6–1.2)
DIFFERENTIAL METHOD BLD: ABNORMAL
EOSINOPHIL # BLD: 0.24 K/UL (ref 0.04–0.36)
EOSINOPHIL NFR BLD: 3.4 %
ERYTHROCYTE [DISTWIDTH] IN BLOOD BY AUTOMATED COUNT: 13.3 % (ref 11.7–14.4)
GFR SERPL CREATININE-BSD FRML MDRD: >60 ML/MIN/1.73M*2
GLUCOSE SERPL-MCNC: 100 MG/DL (ref 70–99)
HCT VFR BLDCO AUTO: 45.6 % (ref 35–45)
HDLC SERPL-MCNC: 67 MG/DL
HDLC SERPL: 1.9 {RATIO}
HGB BLD-MCNC: 14.7 G/DL (ref 11.8–15.7)
IMM GRANULOCYTES # BLD AUTO: 0.02 K/UL (ref 0–0.08)
IMM GRANULOCYTES NFR BLD AUTO: 0.3 %
LDLC SERPL CALC-MCNC: 50 MG/DL
LYMPHOCYTES # BLD: 3.04 K/UL (ref 1.2–3.5)
LYMPHOCYTES NFR BLD: 42.9 %
MCH RBC QN AUTO: 33 PG (ref 28–33.2)
MCHC RBC AUTO-ENTMCNC: 32.2 G/DL (ref 32.2–35.5)
MCV RBC AUTO: 102.5 FL (ref 83–98)
MONOCYTES # BLD: 0.59 K/UL (ref 0.28–0.8)
MONOCYTES NFR BLD: 8.3 %
NEUTROPHILS # BLD: 3.13 K/UL (ref 1.7–7)
NEUTS SEG NFR BLD: 44.1 %
NONHDLC SERPL-MCNC: 63 MG/DL
NRBC BLD-RTO: 0 %
PDW BLD AUTO: 12.4 FL (ref 9.4–12.3)
PLATELET # BLD AUTO: 174 K/UL (ref 150–369)
POTASSIUM SERPL-SCNC: 4.3 MEQ/L (ref 3.5–5.1)
PROT SERPL-MCNC: 6.1 G/DL (ref 6–8.2)
RBC # BLD AUTO: 4.45 M/UL (ref 3.93–5.22)
SODIUM SERPL-SCNC: 141 MEQ/L (ref 136–145)
TRIGL SERPL-MCNC: 63 MG/DL
TSH SERPL DL<=0.05 MIU/L-ACNC: 1.6 MIU/L (ref 0.34–5.6)
WBC # BLD AUTO: 7.09 K/UL (ref 3.8–10.5)

## 2023-10-13 PROCEDURE — 80053 COMPREHEN METABOLIC PANEL: CPT | Performed by: INTERNAL MEDICINE

## 2023-10-13 PROCEDURE — 36415 COLL VENOUS BLD VENIPUNCTURE: CPT | Performed by: INTERNAL MEDICINE

## 2023-10-13 PROCEDURE — 82306 VITAMIN D 25 HYDROXY: CPT | Performed by: INTERNAL MEDICINE

## 2023-10-13 PROCEDURE — 85025 COMPLETE CBC W/AUTO DIFF WBC: CPT | Performed by: INTERNAL MEDICINE

## 2023-10-13 PROCEDURE — 84443 ASSAY THYROID STIM HORMONE: CPT | Performed by: INTERNAL MEDICINE

## 2023-10-13 PROCEDURE — 80061 LIPID PANEL: CPT | Performed by: INTERNAL MEDICINE

## 2023-10-16 ENCOUNTER — TELEPHONE (OUTPATIENT)
Dept: INTERNAL MEDICINE | Facility: CLINIC | Age: 88
End: 2023-10-16
Payer: MEDICARE

## 2023-10-23 ENCOUNTER — HOSPITAL ENCOUNTER (OUTPATIENT)
Dept: RADIOLOGY | Facility: HOSPITAL | Age: 88
Discharge: HOME | End: 2023-10-23
Attending: INTERNAL MEDICINE
Payer: MEDICARE

## 2023-10-23 DIAGNOSIS — M81.0 AGE-RELATED OSTEOPOROSIS WITHOUT CURRENT PATHOLOGICAL FRACTURE: ICD-10-CM

## 2023-10-23 DIAGNOSIS — Z12.31 VISIT FOR SCREENING MAMMOGRAM: ICD-10-CM

## 2023-10-23 PROCEDURE — 77063 BREAST TOMOSYNTHESIS BI: CPT

## 2023-10-23 PROCEDURE — 77080 DXA BONE DENSITY AXIAL: CPT

## 2023-11-06 ENCOUNTER — TELEPHONE (OUTPATIENT)
Dept: INTERNAL MEDICINE | Facility: CLINIC | Age: 88
End: 2023-11-06
Payer: MEDICARE

## 2023-11-06 NOTE — TELEPHONE ENCOUNTER
Patient received a letter stating she is to call her PCP about her Dexa Scan results    [phone]

## 2023-11-06 NOTE — TELEPHONE ENCOUNTER
DEXA scan showed osteoporosis.  Patient is already on calcium and vitamin D and Boniva.  Continue current medications.

## 2023-11-13 RX ORDER — LEVOTHYROXINE SODIUM 75 UG/1
TABLET ORAL
Qty: 90 TABLET | Refills: 0 | Status: SHIPPED | OUTPATIENT
Start: 2023-11-13 | End: 2024-02-07

## 2023-11-16 ENCOUNTER — TELEPHONE (OUTPATIENT)
Dept: CARDIOLOGY | Facility: CLINIC | Age: 88
End: 2023-11-16

## 2023-11-16 NOTE — TELEPHONE ENCOUNTER
Patient called was looking for the results of the lipid panel. She was unsure why he crestor was cut down to 10 mg .    I read over the last office visit       Hyperlipidemia  LDL 42 on 7/7/22  Subjective stiffness. She wants to try lower dose of crestor  Start crestor 10 in place of 20  Recheck lipid panel and f/u 8 mo with TTE for valvular disease      patient wasn't sure if she should stay on crestor 10 based off her lab result please advise

## 2023-11-20 RX ORDER — RALOXIFENE HYDROCHLORIDE 60 MG/1
TABLET, FILM COATED ORAL
Qty: 90 TABLET | Refills: 0 | Status: SHIPPED | OUTPATIENT
Start: 2023-11-20 | End: 2024-02-20 | Stop reason: SDUPTHER

## 2024-02-06 ENCOUNTER — TELEPHONE (OUTPATIENT)
Dept: INTERNAL MEDICINE | Facility: CLINIC | Age: 89
End: 2024-02-06

## 2024-02-06 ENCOUNTER — OFFICE VISIT (OUTPATIENT)
Dept: INTERNAL MEDICINE | Facility: CLINIC | Age: 89
End: 2024-02-06
Payer: MEDICARE

## 2024-02-06 ENCOUNTER — HOSPITAL ENCOUNTER (OUTPATIENT)
Dept: RADIOLOGY | Facility: HOSPITAL | Age: 89
Discharge: HOME | End: 2024-02-06
Attending: INTERNAL MEDICINE
Payer: MEDICARE

## 2024-02-06 VITALS
HEART RATE: 87 BPM | OXYGEN SATURATION: 98 % | BODY MASS INDEX: 26.9 KG/M2 | TEMPERATURE: 98 F | DIASTOLIC BLOOD PRESSURE: 70 MMHG | SYSTOLIC BLOOD PRESSURE: 120 MMHG | HEIGHT: 60 IN | WEIGHT: 137 LBS

## 2024-02-06 DIAGNOSIS — R21 RASH: ICD-10-CM

## 2024-02-06 DIAGNOSIS — M35.3 POLYMYALGIA RHEUMATICA (CMS/HCC): ICD-10-CM

## 2024-02-06 DIAGNOSIS — M25.552 LEFT HIP PAIN: Primary | ICD-10-CM

## 2024-02-06 DIAGNOSIS — I73.89 OTHER SPECIFIED PERIPHERAL VASCULAR DISEASES: ICD-10-CM

## 2024-02-06 DIAGNOSIS — N18.31 CHRONIC KIDNEY DISEASE, STAGE 3A (CMS/HCC): ICD-10-CM

## 2024-02-06 DIAGNOSIS — M25.552 LEFT HIP PAIN: ICD-10-CM

## 2024-02-06 DIAGNOSIS — K12.0 APHTHOUS ULCER: ICD-10-CM

## 2024-02-06 PROCEDURE — 73502 X-RAY EXAM HIP UNI 2-3 VIEWS: CPT | Mod: LT

## 2024-02-06 PROCEDURE — G2211 COMPLEX E/M VISIT ADD ON: HCPCS | Performed by: INTERNAL MEDICINE

## 2024-02-06 PROCEDURE — 99214 OFFICE O/P EST MOD 30 MIN: CPT | Performed by: INTERNAL MEDICINE

## 2024-02-06 RX ORDER — BETAMETHASONE VALERATE 1 MG/G
1 CREAM TOPICAL AS NEEDED
Qty: 45 G | Refills: 1 | Status: SHIPPED | OUTPATIENT
Start: 2024-02-06

## 2024-02-06 ASSESSMENT — ENCOUNTER SYMPTOMS
GASTROINTESTINAL NEGATIVE: 1
RESPIRATORY NEGATIVE: 1
PSYCHIATRIC NEGATIVE: 1
NEUROLOGICAL NEGATIVE: 1
CONSTITUTIONAL NEGATIVE: 1
EYES NEGATIVE: 1
CARDIOVASCULAR NEGATIVE: 1

## 2024-02-06 NOTE — PROGRESS NOTES
Chief Complaint   Patient presents with   • Pain     Pain on the left side. Did not radiated down the leg, pt walked, no injury to the area, pt did take tyleol which did help.      89 years old pleasant female present with left-sided hip pain.  Symptoms started abruptly.  Started yesterday.  At that point got up from the chair.  It was sharp.  Without radiation.  Patient did apply heat locally and took 2 Tylenol with moderate relief.  Symptom is much improved this morning.      Would like to go for recent COVID test results.  Patient has lumbar T-score of -2.1, left hip was -2.8, right hip was -2.2.  2021 DEXA revealed lumbar T-score of -2.4, left hip was -2.6 and right hip was 2.0. Test results reviewed with the patient.  Patient was given the opportunity to ask questions and they were answered by myself.  Patient is taking calcium with vitamin D.  Takes Boniva once a month.  Does resistance exercise regularly at Gym.  Denies low back pain or additional shortening of height.       Past Medical History:   Diagnosis Date   • Atopic keratoconjunctivitis    • Corneal ulcer of both eyes    • Femur fracture (CMS/[facility])    • Hypothyroidism    • Polymyalgia rheumatica (CMS/[facility])    • Rib fractures 06/2023   • S/P knee replacement 2012   • Shoulder fracture, left        Current Outpatient Medications   Medication Sig Dispense Refill   • aspirin 81 mg enteric coated tablet Take 1 tablet (81 mg total) by mouth daily. 30 tablet 0   • betamethasone valerate (VALISONE) 0.1 % cream Apply 1 application. topically as needed for irritation (eczema). 45 g 1   • calcium carbonate-vitamin D3 600 mg-10 mcg (400 unit) capsule Take 1 tablet by mouth 2 (two) times a day.     • ibandronate (BONIVA) 150 mg tablet TAKE 1 TABLET EVERY 30 DAYS. TAKE IN THE MORNING WITH GLASS OF WATER PRIOR TO FOOD, DO NOT LIE DOWN FOR 30 MINUTES. 3 tablet 2   • levothyroxine (SYNTHROID) 75 mcg tablet TAKE ONE TABLET BY MOUTH ONE TIME DAILY 90 tablet 0   •  "prednisoLONE acetate (PRED FORTE) 1 % ophthalmic suspension Administer 1 drop into both eyes 4 (four) times a day. ** Shake well before administration **     • raloxifene (EVISTA) 60 mg tablet TAKE 1 TABLET ONCE DAILY 90 tablet 0   • rosuvastatin (CRESTOR) 10 mg tablet Take 1 tablet (10 mg total) by mouth daily. 90 tablet 3   • valACYclovir (VALTREX) 1 gram tablet Take 1,000 mg by mouth daily. To prevent herpes infection in her eyes   Because she is on fml eye drops       No current facility-administered medications for this visit.       Review of Systems   Constitutional: Negative.    HENT:        Complaint of painful lesion on the left side of the neck.  Symptoms started abruptly.  It occurs after she ate a hot spring roll.  Symptom is mild, constant, no aggravating or relieving factor.  Denies associated difficulty swallowing, cough, facial pain.   Eyes: Negative.    Respiratory: Negative.    Cardiovascular: Negative.    Gastrointestinal: Negative.    Musculoskeletal:        See HPI   Skin:        History of rash and needs Betamethasone cream to keep it handy.   Neurological: Negative.    Psychiatric/Behavioral: Negative.         Vitals:    02/06/24 0948   BP: 120/70   BP Location: Left upper arm   Patient Position: Sitting   Pulse: 87   Temp: 36.7 °C (98 °F)   TempSrc: Oral   SpO2: 98%   Weight: 62.1 kg (137 lb)   Height: 1.53 m (5' 0.24\")       Body mass index is 26.55 kg/m².    Physical Exam  Constitutional:       Appearance: Normal appearance.   HENT:      Right Ear: Tympanic membrane normal.      Left Ear: Tympanic membrane normal.      Mouth/Throat:      Mouth: Mucous membranes are moist.        Comments: Left upper COVID.  Has aphthous ulcer present.  Lesion is red with central clearing.  Blanching.  Mildly tender to touch.  Size is 2 mm x 2 mm.    Eyes:      Conjunctiva/sclera: Conjunctivae normal.      Pupils: Pupils are equal, round, and reactive to light.   Cardiovascular:      Rate and Rhythm: Normal " rate and regular rhythm.      Pulses: Normal pulses.      Heart sounds: Normal heart sounds.   Pulmonary:      Effort: Pulmonary effort is normal.      Breath sounds: Normal breath sounds.   Abdominal:      General: Abdomen is flat. Bowel sounds are normal.      Palpations: Abdomen is soft.   Musculoskeletal:         General: Normal range of motion.      Cervical back: Normal range of motion and neck supple.      Lumbar back: Normal. No deformity, signs of trauma, tenderness or bony tenderness. Normal range of motion. Negative right straight leg raise test. No scoliosis.   Skin:     General: Skin is warm.      Capillary Refill: Capillary refill takes 2 to 3 seconds.   Neurological:      Mental Status: She is alert and oriented to person, place, and time.   Psychiatric:         Mood and Affect: Mood normal.         Behavior: Behavior normal.        1. Left hip pain    To continue heating pad and Tylenol 1 tablet every 4-6 hours as needed.  X-ray of left hip.  To have physical therapy.  Follow-up if symptoms does not improve.    Bone density test explained.  To continue current medications and supplements.    - X-RAY HIP WITH OR WITHOUT PELVIS 4+ VW LEFT; Future  - Ambulatory referral to Physical Therapy; Future    2. Rash    Prescription refill dispensed.  - betamethasone valerate (VALISONE) 0.1 % cream; Apply 1 application. topically as needed for irritation (eczema).  Dispense: 45 g; Refill: 1    3. Aphthous ulcer    Use over-the-counter oral gel as directed.  - benzocaine (ORAJEL) 10 % mucosal gel; Apply to the mouth or throat as needed for mucositis.  Dispense: 5.3 g; Refill: 0    4. Other specified peripheral vascular diseases (CMS/HCC)    Stable.    5. Polymyalgia rheumatica (CMS/HCC)    Stable.    6. Chronic kidney disease, stage 3a (CMS/HCC)    Stable.    I attest that this visit supports the complexity inherent to evaluation and management associated with medical care services that serve as the continuing  focal point for all needed health care services and/or medical care services that are part of ongoing care related to this patient's single, serious condition or a complex condition.      Bharath Sinha MD

## 2024-02-07 RX ORDER — LEVOTHYROXINE SODIUM 75 UG/1
TABLET ORAL
Qty: 90 TABLET | Refills: 1 | Status: SHIPPED | OUTPATIENT
Start: 2024-02-07 | End: 2024-08-02

## 2024-02-20 RX ORDER — IBANDRONATE SODIUM 150 MG/1
150 TABLET, FILM COATED ORAL
Qty: 3 TABLET | Refills: 2 | Status: SHIPPED | OUTPATIENT
Start: 2024-02-20 | End: 2024-02-23 | Stop reason: SDUPTHER

## 2024-02-20 RX ORDER — RALOXIFENE HYDROCHLORIDE 60 MG/1
60 TABLET, FILM COATED ORAL DAILY
Qty: 90 TABLET | Refills: 0 | Status: SHIPPED | OUTPATIENT
Start: 2024-02-20 | End: 2024-03-05 | Stop reason: SDUPTHER

## 2024-02-23 RX ORDER — IBANDRONATE SODIUM 150 MG/1
150 TABLET, FILM COATED ORAL
Qty: 3 TABLET | Refills: 2 | Status: SHIPPED | OUTPATIENT
Start: 2024-02-23 | End: 2025-01-14

## 2024-03-01 NOTE — PROGRESS NOTES
Cardiology  Office Progress Note         Reason for visit:   Chief Complaint   Patient presents with   • Follow-up       HPI     Catia Ward is a 89 y.o. female who presents to the office for cardiovascular follow up and management of AI and CVA s/p ILR 3/25/22 with no afib to date.      She was last seen in the office 7/20/23 and noted that she had lost 5 lbs since she cut back on her sweets. She was trying to stay well hydrated. She continued to have no Afib on her ILR. I planned to repeat a TTE in 2 years for her mild to mod AI 3/22. Her most recent LDL was 42 7/22. She wanted to try and lower her Crestor dose due to stiffness. I agreed she could try 10 mg in place of her 20. She would recheck a FLP prior to our follow up visit with TTE in 8 months.     Today she is feeling well an denies any cardiac complaints. She is taking her Crestor 10 mg daily and feeing well. She has been having trouble with her eyes and waiting on an optometrist appointment for this.       FLP 10/13/23 , Tgl 63, HDL 67, LDL 50   CMP BUN 21, Creat 0.8 eGFR >60   Past Medical History:   Diagnosis Date   • Atopic keratoconjunctivitis    • Corneal ulcer of both eyes    • Femur fracture (CMS/HCC)    • Hypothyroidism    • Polymyalgia rheumatica (CMS/HCC)    • Rib fractures 06/2023   • S/P knee replacement 2012   • Shoulder fracture, left        Past Surgical History:   Procedure Laterality Date   • ADENOIDECTOMY     • CATARACT EXTRACTION     • CORNEAL TRANSPLANT Left     Feburary,2023   • JOINT REPLACEMENT     • KERATOPLASTY     • TONSILLECTOMY         Social History     Tobacco Use   • Smoking status: Never   • Smokeless tobacco: Never   Vaping Use   • Vaping Use: Never used   Substance Use Topics   • Alcohol use: Yes     Comment: social - maybe 3-4 drinks/week   • Drug use: No       Family History   Problem Relation Age of Onset   • Diabetes Biological Brother    • Lung cancer Biological Brother        Allergies:  Codeine,  Gentamicin, and Gentamicin sulfate    Current Outpatient Medications   Medication Sig Dispense Refill   • aspirin 81 mg enteric coated tablet Take 1 tablet (81 mg total) by mouth daily. 30 tablet 0   • benzocaine (ORAJEL) 10 % mucosal gel Apply to the mouth or throat as needed for mucositis. 5.3 g 0   • betamethasone valerate (VALISONE) 0.1 % cream Apply 1 application. topically as needed for irritation (eczema). 45 g 1   • calcium carbonate-vitamin D3 600 mg-10 mcg (400 unit) capsule Take 1 tablet by mouth 2 (two) times a day.     • ibandronate (BONIVA) 150 mg tablet Take 1 tablet (150 mg total) by mouth every 30 (thirty) days. Take in AM with glass of water prior to food, don't lie down for 30 minutes. 3 tablet 2   • levothyroxine (SYNTHROID) 75 mcg tablet TAKE ONE TABLET BY MOUTH ONE TIME DAILY 90 tablet 1   • prednisoLONE acetate (PRED FORTE) 1 % ophthalmic suspension Administer 1 drop into both eyes 4 (four) times a day. ** Shake well before administration **     • raloxifene (EVISTA) 60 mg tablet Take 1 tablet (60 mg total) by mouth daily. 90 tablet 0   • rosuvastatin (CRESTOR) 10 mg tablet Take 1 tablet (10 mg total) by mouth daily. 90 tablet 3   • valACYclovir (VALTREX) 1 gram tablet Take 1,000 mg by mouth daily. To prevent herpes infection in her eyes   Because she is on fml eye drops       No current facility-administered medications for this visit.       Review of Systems   Constitutional: Negative for malaise/fatigue.   Cardiovascular: Negative for chest pain, dyspnea on exertion, irregular heartbeat, leg swelling and palpitations.   Respiratory: Negative for shortness of breath and sleep disturbances due to breathing.    Neurological: Negative for dizziness and light-headedness.       Objective     Vitals:    03/11/24 0942   Pulse: 63   SpO2: 95%       Wt Readings from Last 3 Encounters:   03/11/24 62.1 kg (137 lb)   02/06/24 62.1 kg (137 lb)   10/02/23 61.2 kg (135 lb)       Body mass index is 26.55  kg/m².    Physical Exam  Vitals reviewed.   Cardiovascular:      Rate and Rhythm: Normal rate and regular rhythm.      Pulses: Normal pulses.           Carotid pulses are 2+ on the right side and 2+ on the left side.     Heart sounds: Normal heart sounds.   Pulmonary:      Effort: Pulmonary effort is normal.   Musculoskeletal:         General: Normal range of motion.   Skin:     General: Skin is warm.      Capillary Refill: Capillary refill takes less than 2 seconds.   Neurological:      Mental Status: She is alert and oriented to person, place, and time.         ECG   Normal sinus rhythm  Normal ECG  When compared with ECG of 20-JUL-2023 11:46,  Previous ECG has undetermined rhythm, needs review      Hematology  Lab Results   Component Value Date    WBC 7.09 10/13/2023    HGB 14.7 10/13/2023    HCT 45.6 (H) 10/13/2023     10/13/2023    INR 1.0 03/23/2022       Chemistries  Lab Results   Component Value Date     10/13/2023    K 4.3 10/13/2023     10/13/2023    CREATININE 0.8 10/13/2023    BUN 21 10/13/2023    CO2 28 10/13/2023    GLUCOSE 100 (H) 10/13/2023    CALCIUM 9.7 10/13/2023    ALT 15 10/13/2023    AST 28 10/13/2023       Cholesterol  Lab Results   Component Value Date    CHOL 130 10/13/2023    TRIG 63 10/13/2023    HDL 67 10/13/2023    LDLCALC 50 10/13/2023    NONHDLCALC 63 10/13/2023       Endocrine  Lab Results   Component Value Date    TSH 1.60 10/13/2023    HGBA1C 5.8 (H) 04/03/2023       Cardiac Imaging    TRANSTHORACIC ECHO (TTE) COMPLETE 03/24/2022    Interpretation Summary  · Normal-sized LV. Preserved LV systolic function. Estimated EF 65%. No regional wall motion abnormalities. Normal LV wall thickness. Normal diastolic filling pattern for age of the LV.  · Normal-sized RV. Normal RV systolic function.  · Normal-sized LA. Normal-sized RA.  · Tricuspid aortic valve. Sclerotic aortic valve leaflets. Mild to moderate aortic valve regurgitation. No aortic valve stenosis. Aortic root  normal. Ascending aorta normal-sized.  · Mild mitral annular calcification. Mild mitral valve regurgitation. No significant mitral valve stenosis.  · Mild tricuspid valve regurgitation. Estimated RVSP = 26 mmHg.  · Pulmonic valve not well visualized.  · Normal-sized IVC. IVC demonstrates normal respiratory collapse.  · No evidence of pericardial effusion.      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 Will continue to monitor    Hyperlipidemia  LDL 42 on 7/7/22  Subjective stiffness. She wanted to try lower dose of crestor  Started crestor 10 in place of 20  Recheck lipid panel and f/u 8 mo with TTE for valvular disease  LDL 50 on 10/13/23  PMR likely etiology of her stiffness    Nonrheumatic aortic valve insufficiency  Mild-moderate AI on 3/24/2022 TTE  Also mild MR, mild TR  Due for TTE; will check with next visit in 6 mo        Orders Placed This Encounter   Procedures   • ECG 12 lead     Scheduling Instructions:      PLEASE USE THIS ORDER FOR ECG'S PERFORMED IN PHYSICIAN OFFICES     Order Specific Question:   Release to patient     Answer:   Immediate     Order Specific Question:   Release to patient     Answer:   Immediate [1]   • Transthoracic echo (TTE) complete     Standing Status:   Future     Standing Expiration Date:   3/11/2026     Scheduling Instructions:      To schedule cardiology appointments with [facility], call Central Scheduling at [phone]. Thank you.     Order Specific Question:   Is contrast and/or agitated saline (bubbles)  indicated for the study? (Contrast = Definity OR Lumason)     Answer:   No     Order Specific Question:   Release to patient     Answer:   Immediate [1]       Follow Up Plans:  6 month         IYara, am scribing for, and in the presence of, Xavier Barry MD.     Xavier COON MD, personally performed the services described in this documentation as scribed by Yara Moise in my presence, and it is both accurate and complete.     Xavier Barry MD  3/11/2024

## 2024-03-05 RX ORDER — RALOXIFENE HYDROCHLORIDE 60 MG/1
60 TABLET, FILM COATED ORAL DAILY
Qty: 90 TABLET | Refills: 0 | Status: SHIPPED | OUTPATIENT
Start: 2024-03-05 | End: 2024-10-10 | Stop reason: SDUPTHER

## 2024-03-11 ENCOUNTER — OFFICE VISIT (OUTPATIENT)
Dept: CARDIOLOGY | Facility: CLINIC | Age: 89
End: 2024-03-11
Payer: MEDICARE

## 2024-03-11 VITALS — OXYGEN SATURATION: 95 % | HEART RATE: 63 BPM | WEIGHT: 137 LBS | HEIGHT: 60 IN | BODY MASS INDEX: 26.9 KG/M2

## 2024-03-11 DIAGNOSIS — I35.1 NONRHEUMATIC AORTIC VALVE INSUFFICIENCY: Primary | ICD-10-CM

## 2024-03-11 DIAGNOSIS — I63.411 CEREBRAL INFARCTION DUE TO EMBOLISM OF RIGHT MIDDLE CEREBRAL ARTERY (CMS/HCC): ICD-10-CM

## 2024-03-11 DIAGNOSIS — E78.2 MIXED HYPERLIPIDEMIA: ICD-10-CM

## 2024-03-11 LAB
ATRIAL RATE: 62
P AXIS: 67
PR INTERVAL: 176
QRS DURATION: 74
QT INTERVAL: 442
QTC CALCULATION(BAZETT): 448
R AXIS: 23
T WAVE AXIS: 47
VENTRICULAR RATE: 62

## 2024-03-11 PROCEDURE — 93000 ELECTROCARDIOGRAM COMPLETE: CPT | Performed by: INTERNAL MEDICINE

## 2024-03-11 PROCEDURE — 99214 OFFICE O/P EST MOD 30 MIN: CPT | Performed by: INTERNAL MEDICINE

## 2024-03-11 ASSESSMENT — ENCOUNTER SYMPTOMS
SHORTNESS OF BREATH: 0
DIZZINESS: 0
LIGHT-HEADEDNESS: 0
IRREGULAR HEARTBEAT: 0
SLEEP DISTURBANCES DUE TO BREATHING: 0
DYSPNEA ON EXERTION: 0
PALPITATIONS: 0

## 2024-03-11 NOTE — ASSESSMENT & PLAN NOTE
LDL 42 on 7/7/22  Subjective stiffness. She wanted to try lower dose of crestor  Started crestor 10 in place of 20  Recheck lipid panel and f/u 8 mo with TTE for valvular disease  LDL 50 on 10/13/23  PMR likely etiology of her stiffness

## 2024-03-11 NOTE — ASSESSMENT & PLAN NOTE
Mild-moderate AI on 3/24/2022 TTE  Also mild MR, mild TR    Due for TTE; will check next available

## 2024-04-01 ENCOUNTER — OFFICE VISIT (OUTPATIENT)
Dept: INTERNAL MEDICINE | Facility: CLINIC | Age: 89
End: 2024-04-01
Payer: MEDICARE

## 2024-04-01 VITALS
HEIGHT: 60 IN | TEMPERATURE: 98.6 F | SYSTOLIC BLOOD PRESSURE: 122 MMHG | BODY MASS INDEX: 26.5 KG/M2 | HEART RATE: 80 BPM | DIASTOLIC BLOOD PRESSURE: 80 MMHG | OXYGEN SATURATION: 96 % | WEIGHT: 135 LBS

## 2024-04-01 DIAGNOSIS — H90.3 SENSORINEURAL HEARING LOSS (SNHL) OF BOTH EARS: Primary | ICD-10-CM

## 2024-04-01 DIAGNOSIS — H61.23 BILATERAL IMPACTED CERUMEN: ICD-10-CM

## 2024-04-01 PROCEDURE — 99213 OFFICE O/P EST LOW 20 MIN: CPT | Performed by: INTERNAL MEDICINE

## 2024-04-01 PROCEDURE — G2211 COMPLEX E/M VISIT ADD ON: HCPCS | Performed by: INTERNAL MEDICINE

## 2024-04-01 ASSESSMENT — ENCOUNTER SYMPTOMS
SPEECH DIFFICULTY: 0
RESPIRATORY NEGATIVE: 1
HEADACHES: 0
DIZZINESS: 0
TREMORS: 0
FACIAL ASYMMETRY: 0
LIGHT-HEADEDNESS: 0
NUMBNESS: 0
CONSTITUTIONAL NEGATIVE: 1
CARDIOVASCULAR NEGATIVE: 1
WEAKNESS: 0
MUSCULOSKELETAL NEGATIVE: 1

## 2024-04-01 NOTE — PROGRESS NOTES
Chief Complaint   Patient presents with    Follow-up     Right ear plugged     89 years old female with history of hearing loss and wears hearing aids.  Present with ear feeling plugged.  Right side more than left.  Symptoms started gradually.  It is constant.  There is no aggravating or relieving factor.  Denies dizziness, lightheadedness, ear discharge or ringing in the ear         Past Medical History:   Diagnosis Date    Atopic keratoconjunctivitis     Corneal ulcer of both eyes     Femur fracture (CMS/[facility])     Hypothyroidism     Polymyalgia rheumatica (CMS/[facility])     Rib fractures 06/2023    S/P knee replacement 2012    Shoulder fracture, left        Current Outpatient Medications   Medication Sig Dispense Refill    aspirin 81 mg enteric coated tablet Take 1 tablet (81 mg total) by mouth daily. 30 tablet 0    betamethasone valerate (VALISONE) 0.1 % cream Apply 1 application. topically as needed for irritation (eczema). 45 g 1    calcium carbonate-vitamin D3 600 mg-10 mcg (400 unit) capsule Take 1 tablet by mouth 2 (two) times a day.      ibandronate (BONIVA) 150 mg tablet Take 1 tablet (150 mg total) by mouth every 30 (thirty) days. Take in AM with glass of water prior to food, don't lie down for 30 minutes. 3 tablet 2    levothyroxine (SYNTHROID) 75 mcg tablet TAKE ONE TABLET BY MOUTH ONE TIME DAILY 90 tablet 1    prednisoLONE acetate (PRED FORTE) 1 % ophthalmic suspension Administer 1 drop into both eyes 4 (four) times a day. ** Shake well before administration **      raloxifene (EVISTA) 60 mg tablet Take 1 tablet (60 mg total) by mouth daily. 90 tablet 0    rosuvastatin (CRESTOR) 10 mg tablet Take 1 tablet (10 mg total) by mouth daily. 90 tablet 3    valACYclovir (VALTREX) 1 gram tablet Take 1,000 mg by mouth daily. To prevent herpes infection in her eyes   Because she is on fml eye drops       No current facility-administered medications for this visit.       Review of Systems   Constitutional: Negative.   "  Respiratory: Negative.     Cardiovascular: Negative.    Musculoskeletal: Negative.    Neurological:  Negative for dizziness, tremors, syncope, facial asymmetry, speech difficulty, weakness, light-headedness, numbness and headaches.        Vitals:    04/01/24 0847   BP: 122/80   BP Location: Left upper arm   Patient Position: Sitting   Pulse: 80   Temp: 37 °C (98.6 °F)   TempSrc: Oral   SpO2: 96%   Weight: 61.2 kg (135 lb)   Height: 1.53 m (5' 0.24\")       Body mass index is 26.16 kg/m².    Physical Exam  HENT:      Right Ear: There is impacted cerumen.      Left Ear: There is impacted cerumen.   Eyes:      Extraocular Movements: Extraocular movements intact.      Pupils: Pupils are equal, round, and reactive to light.   Cardiovascular:      Rate and Rhythm: Normal rate.   Musculoskeletal:         General: Normal range of motion.      Cervical back: Normal range of motion.   Skin:     General: Skin is warm.   Neurological:      General: No focal deficit present.      Mental Status: She is alert.        1. Sensorineural hearing loss (SNHL) of both ears    Hearing loss, secondary to sensorineural hearing loss superimposed by bilateral cerumen impaction.    2. Bilateral impacted cerumen    Right irrigation completed.  Tympanic membrane was unremarkable.  Left side was unable to complete due to localized pain.  Debrox placed.  To call if symptoms persist.    - Ear Cerumen Removal     I attest that this visit supports the complexity inherent to evaluation and management associated with medical care services that serve as the continuing focal point for all needed health care services and/or medical care services that are part of ongoing care related to this patient's single, serious condition or a complex condition.     Bharath Sinha MD     "

## 2024-04-01 NOTE — PROCEDURES
Ear Cerumen Removal    Date/Time: 4/1/2024 9:20 AM    Performed by: Bharath Sinha MD  Authorized by: Bharath Sinha MD    Consent:     Consent obtained:  Verbal    Consent given by:  Patient    Risks discussed:  Bleeding, infection, pain, TM perforation, incomplete removal and dizziness    Alternatives discussed:  No treatment  Universal protocol:     Patient identity confirmed:  Verbally with patient  Procedure details:     Procedure type: irrigation    Post-procedure details:     Procedure completion:  Tolerated

## 2024-07-19 RX ORDER — ROSUVASTATIN CALCIUM 10 MG/1
10 TABLET, COATED ORAL DAILY
Qty: 90 TABLET | Refills: 3 | Status: SHIPPED | OUTPATIENT
Start: 2024-07-19

## 2024-07-19 NOTE — TELEPHONE ENCOUNTER
[facility] Refill Request      MA Notes:      Nurse Notes:      Last Office Visit: 3/11/2024  Last Telemedicine Visit: Visit date not found    Next Office Visit: 9/9/2024  Next Telemedicine Visit: Visit date not found     Most Recent BP Readings:  BP Readings from Last 3 Encounters:   04/01/24 122/80   02/06/24 120/70   10/02/23 130/80       Most recent Lab results:  Lab Results   Component Value Date    CHOL 130 10/13/2023    HDL 67 10/13/2023    TRIG 63 10/13/2023    NONHDLCALC 63 10/13/2023       Lab Results   Component Value Date    AST 28 10/13/2023    ALT 15 10/13/2023       Lab Results   Component Value Date     10/13/2023    K 4.3 10/13/2023    BUN 21 10/13/2023    CREATININE 0.8 10/13/2023       Current Medications:    Current Outpatient Medications:     aspirin 81 mg enteric coated tablet, Take 1 tablet (81 mg total) by mouth daily., Disp: 30 tablet, Rfl: 0    betamethasone valerate (VALISONE) 0.1 % cream, Apply 1 application. topically as needed for irritation (eczema)., Disp: 45 g, Rfl: 1    calcium carbonate-vitamin D3 600 mg-10 mcg (400 unit) capsule, Take 1 tablet by mouth 2 (two) times a day., Disp: , Rfl:     ibandronate (BONIVA) 150 mg tablet, Take 1 tablet (150 mg total) by mouth every 30 (thirty) days. Take in AM with glass of water prior to food, don't lie down for 30 minutes., Disp: 3 tablet, Rfl: 2    levothyroxine (SYNTHROID) 75 mcg tablet, TAKE ONE TABLET BY MOUTH ONE TIME DAILY, Disp: 90 tablet, Rfl: 1    prednisoLONE acetate (PRED FORTE) 1 % ophthalmic suspension, Administer 1 drop into both eyes 4 (four) times a day. ** Shake well before administration **, Disp: , Rfl:     raloxifene (EVISTA) 60 mg tablet, Take 1 tablet (60 mg total) by mouth daily., Disp: 90 tablet, Rfl: 0    rosuvastatin (CRESTOR) 10 mg tablet, Take 1 tablet (10 mg total) by mouth daily., Disp: 90 tablet, Rfl: 3    valACYclovir (VALTREX) 1 gram tablet, Take 1,000 mg by mouth daily. To  prevent herpes infection in her eyes  Because she is on fml eye drops, Disp: , Rfl:

## 2024-07-22 ENCOUNTER — OFFICE VISIT (OUTPATIENT)
Dept: INTERNAL MEDICINE | Facility: CLINIC | Age: 89
End: 2024-07-22
Payer: MEDICARE

## 2024-07-22 ENCOUNTER — HOSPITAL ENCOUNTER (OUTPATIENT)
Dept: RADIOLOGY | Facility: HOSPITAL | Age: 89
Discharge: HOME | End: 2024-07-22
Attending: NURSE PRACTITIONER
Payer: MEDICARE

## 2024-07-22 ENCOUNTER — APPOINTMENT (OUTPATIENT)
Dept: LAB | Facility: HOSPITAL | Age: 89
End: 2024-07-22
Attending: NURSE PRACTITIONER
Payer: MEDICARE

## 2024-07-22 VITALS
DIASTOLIC BLOOD PRESSURE: 66 MMHG | BODY MASS INDEX: 26.66 KG/M2 | WEIGHT: 137.6 LBS | OXYGEN SATURATION: 96 % | HEART RATE: 92 BPM | TEMPERATURE: 97.9 F | SYSTOLIC BLOOD PRESSURE: 120 MMHG

## 2024-07-22 DIAGNOSIS — M35.3 POLYMYALGIA RHEUMATICA (CMS/HCC): ICD-10-CM

## 2024-07-22 DIAGNOSIS — M25.512 LEFT SHOULDER PAIN, UNSPECIFIED CHRONICITY: Primary | ICD-10-CM

## 2024-07-22 DIAGNOSIS — M25.512 LEFT SHOULDER PAIN, UNSPECIFIED CHRONICITY: ICD-10-CM

## 2024-07-22 LAB
CRP SERPL-MCNC: <1 MG/L
ERYTHROCYTE [SEDIMENTATION RATE] IN BLOOD BY WESTERGREN METHOD: 11 MM/HR

## 2024-07-22 PROCEDURE — 73030 X-RAY EXAM OF SHOULDER: CPT | Mod: LT

## 2024-07-22 PROCEDURE — 99214 OFFICE O/P EST MOD 30 MIN: CPT | Performed by: NURSE PRACTITIONER

## 2024-07-22 PROCEDURE — G2211 COMPLEX E/M VISIT ADD ON: HCPCS | Performed by: NURSE PRACTITIONER

## 2024-07-22 PROCEDURE — 85652 RBC SED RATE AUTOMATED: CPT

## 2024-07-22 PROCEDURE — 36415 COLL VENOUS BLD VENIPUNCTURE: CPT

## 2024-07-22 PROCEDURE — 86140 C-REACTIVE PROTEIN: CPT

## 2024-07-22 ASSESSMENT — ENCOUNTER SYMPTOMS
ABDOMINAL PAIN: 0
NAUSEA: 0
CHILLS: 0
FEVER: 0
SORE THROAT: 0
PALPITATIONS: 0
EYE PAIN: 0
COUGH: 0
ABDOMINAL DISTENTION: 0
WHEEZING: 0
CHEST TIGHTNESS: 0
TROUBLE SWALLOWING: 0
VOMITING: 0
DIARRHEA: 0
SHORTNESS OF BREATH: 0
CONSTIPATION: 0
ARTHRALGIAS: 1

## 2024-07-22 NOTE — PROGRESS NOTES
"  Subjective     Patient ID: Catia Ward is a 89 y.o. female.    L shoulder pain started about a month ago after travelling  Two days ago it got worse and ROM is limited  Hx of L shoulder fx \"several\" years ago  Denies tenderness  Has not taken anything OTC        Review of Systems   Constitutional:  Negative for chills and fever.   HENT:  Negative for ear pain, hearing loss, sore throat and trouble swallowing.    Eyes:  Negative for pain and visual disturbance.   Respiratory:  Negative for cough, chest tightness, shortness of breath and wheezing.    Cardiovascular:  Negative for chest pain, palpitations and leg swelling.   Gastrointestinal:  Negative for abdominal distention, abdominal pain, constipation, diarrhea, nausea and vomiting.   Musculoskeletal:  Positive for arthralgias.       Objective     Vitals:    07/22/24 1406   BP: 120/66   BP Location: Left upper arm   Patient Position: Sitting   Pulse: 92   Temp: 36.6 °C (97.9 °F)   TempSrc: Oral   SpO2: 96%   Weight: 62.4 kg (137 lb 9.6 oz)     Body mass index is 26.66 kg/m².    Physical Exam  Constitutional:       General: She is not in acute distress.     Appearance: She is well-developed. She is not diaphoretic.   HENT:      Head: Normocephalic and atraumatic.   Pulmonary:      Effort: Pulmonary effort is normal.   Musculoskeletal:      Right shoulder: Crepitus present. No swelling, deformity, tenderness or bony tenderness. Decreased range of motion.   Skin:     General: Skin is warm and dry.   Neurological:      Mental Status: She is alert and oriented to person, place, and time.   Psychiatric:         Behavior: Behavior normal.         Thought Content: Thought content normal.         Judgment: Judgment normal.         Assessment/Plan   Diagnoses and all orders for this visit:    Left shoulder pain, unspecified chronicity (Primary)  -     X-RAY SHOULDER LEFT 2+ VIEWS; Future  -     Ambulatory referral to Physical Therapy; Future    Polymyalgia rheumatica " (CMS/[facility])  -     C-reactive protein; Future  -     Sedimentation rate; Future        1. Left shoulder pain, unspecified chronicity  Check xray  Start physical therapy  Recommended tylenol 1,000mg TID PRN  - X-RAY SHOULDER LEFT 2+ VIEWS; Future  - Ambulatory referral to Physical Therapy; Future    2. Polymyalgia rheumatica (CMS/[facility])  Hx of PMR.  She is worried this may be the start of if  Check labs  - C-reactive protein; Future  - Sedimentation rate; Future    I attest that this visit supports the complexity inherent to evaluation and management associated with medical care services that serve as the continuing focal point for all needed health care services and/or medical care services that are part of ongoing care related to this patient's single, serious condition or a complex condition.     YFN Haas

## 2024-07-23 ENCOUNTER — TELEPHONE (OUTPATIENT)
Dept: INTERNAL MEDICINE | Facility: CLINIC | Age: 89
End: 2024-07-23
Payer: MEDICARE

## 2024-07-23 NOTE — TELEPHONE ENCOUNTER
Please let PT know that her blood work was normal  Her shoulder xray support our suspicion that her pain is related to degenerative changes - she has arthritis in addition to the chronic changes from her previous fracture    If pain isn't improving, f/u with ortho. Dr Donaldson if she doesn't any anyone else in mine.

## 2024-08-01 ENCOUNTER — OFFICE VISIT (OUTPATIENT)
Dept: INTERNAL MEDICINE | Facility: CLINIC | Age: 89
End: 2024-08-01
Payer: MEDICARE

## 2024-08-01 VITALS
OXYGEN SATURATION: 98 % | HEART RATE: 85 BPM | DIASTOLIC BLOOD PRESSURE: 80 MMHG | HEIGHT: 60 IN | SYSTOLIC BLOOD PRESSURE: 110 MMHG | WEIGHT: 135 LBS | TEMPERATURE: 98.6 F | BODY MASS INDEX: 26.5 KG/M2

## 2024-08-01 DIAGNOSIS — E03.9 HYPOTHYROIDISM, UNSPECIFIED TYPE: Primary | ICD-10-CM

## 2024-08-01 DIAGNOSIS — E55.9 VITAMIN D DEFICIENCY: ICD-10-CM

## 2024-08-01 DIAGNOSIS — M35.3 POLYMYALGIA RHEUMATICA (CMS/HCC): ICD-10-CM

## 2024-08-01 DIAGNOSIS — Z13.1 DIABETES MELLITUS SCREENING: ICD-10-CM

## 2024-08-01 DIAGNOSIS — M81.0 AGE-RELATED OSTEOPOROSIS WITHOUT CURRENT PATHOLOGICAL FRACTURE: ICD-10-CM

## 2024-08-01 DIAGNOSIS — Z13.0 SCREENING FOR DEFICIENCY ANEMIA: ICD-10-CM

## 2024-08-01 DIAGNOSIS — Z13.220 SCREENING, LIPID: ICD-10-CM

## 2024-08-01 PROCEDURE — 99213 OFFICE O/P EST LOW 20 MIN: CPT | Performed by: INTERNAL MEDICINE

## 2024-08-01 PROCEDURE — G2211 COMPLEX E/M VISIT ADD ON: HCPCS | Performed by: INTERNAL MEDICINE

## 2024-08-01 ASSESSMENT — ENCOUNTER SYMPTOMS
DIZZINESS: 0
HEMATOLOGIC/LYMPHATIC NEGATIVE: 1
SEIZURES: 0
PALPITATIONS: 0
SPEECH DIFFICULTY: 0
FACIAL ASYMMETRY: 0
ABDOMINAL DISTENTION: 0
TREMORS: 0
PSYCHIATRIC NEGATIVE: 1
NUMBNESS: 0
WEAKNESS: 0
CONSTIPATION: 0
BLOOD IN STOOL: 0
ABDOMINAL PAIN: 0
SHORTNESS OF BREATH: 0
WHEEZING: 0
ENDOCRINE COMMENTS: SEE HPI
COUGH: 0
LIGHT-HEADEDNESS: 0

## 2024-08-01 NOTE — PROGRESS NOTES
Consent obtained from patient and all parties present in the room? yes     I have obtained the consent of everyone present in the room to make an audio recording of this visit to assist me in documenting the encounter in the EMR.      Chief Complaint   Patient presents with    Follow-up       History of Present Illness  The patient presents for a follow-up visit.    She is seeking advice on the necessity of a second shingles vaccine, having received one previously in 2017.   Recently, she experienced an inability to move her arm upon waking, although she is unsure of the cause. Despite undergoing two therapy sessions and taking Tylenol, she has seen some improvement.   She maintains a healthy diet, aiming to maintain her weight around 135 pounds.   Her vision in her left eye is satisfactory, following a corneal transplant and contact lens correction, with 20/30 vision in that eye. She also developed macular degeneration in her left eye, with difficulty seeing details.   She uses hearing aids, but background noise consistently causes discomfort.   Her sleep pattern is normal. She attends an exercise class weekly and occasionally visits the gym. She has hypothyroidism.  Takes Synthroid.  Denies unexplained weight loss, weight gain, cold or intolerance.  Also denies constipation or diarrhea.  Patient has history of polymyalgia rheumatica.  Denies any muscle or joint pain.  Patient has history of osteoporosis.  Takes calcium, vitamin D and Boniva.    Denies any back pain or additional loss of height.    SOCIAL HISTORY  She has been living in [facility] for 5 years.       Past Medical History:   Diagnosis Date    Atopic keratoconjunctivitis     Corneal ulcer of both eyes     Femur fracture (CMS/[facility])     Hypothyroidism     Polymyalgia rheumatica (CMS/[facility])     Rib fractures 06/2023    S/P knee replacement 2012    Shoulder fracture, left        Current Outpatient Medications   Medication Sig Dispense Refill     aspirin 81 mg enteric coated tablet Take 1 tablet (81 mg total) by mouth daily. 30 tablet 0    betamethasone valerate (VALISONE) 0.1 % cream Apply 1 application. topically as needed for irritation (eczema). 45 g 1    calcium carbonate-vitamin D3 600 mg-10 mcg (400 unit) capsule Take 1 tablet by mouth 2 (two) times a day.      ibandronate (BONIVA) 150 mg tablet Take 1 tablet (150 mg total) by mouth every 30 (thirty) days. Take in AM with glass of water prior to food, don't lie down for 30 minutes. 3 tablet 2    levothyroxine (SYNTHROID) 75 mcg tablet TAKE ONE TABLET BY MOUTH ONE TIME DAILY 90 tablet 1    prednisoLONE acetate (PRED FORTE) 1 % ophthalmic suspension Administer 1 drop into both eyes 4 (four) times a day. ** Shake well before administration **      raloxifene (EVISTA) 60 mg tablet Take 1 tablet (60 mg total) by mouth daily. 90 tablet 0    rosuvastatin (CRESTOR) 10 mg tablet Take 1 tablet (10 mg total) by mouth daily. 90 tablet 3    valACYclovir (VALTREX) 1 gram tablet Take 1,000 mg by mouth daily. To prevent herpes infection in her eyes   Because she is on fml eye drops       No current facility-administered medications for this visit.       Review of Systems   HENT: Negative.     Eyes:         See HPI   Respiratory:  Negative for cough, shortness of breath and wheezing.    Cardiovascular:  Negative for chest pain, palpitations and leg swelling.   Gastrointestinal:  Negative for abdominal distention, abdominal pain, blood in stool and constipation.   Endocrine:        See HPI   Genitourinary: Negative.    Musculoskeletal:         See HPI   Neurological:  Negative for dizziness, tremors, seizures, syncope, facial asymmetry, speech difficulty, weakness, light-headedness and numbness.   Hematological: Negative.    Psychiatric/Behavioral: Negative.          Vitals:    08/01/24 0942   BP: 110/80   BP Location: Left upper arm   Patient Position: Sitting   Pulse: 85   Temp: 37 °C (98.6 °F)   TempSrc: Oral  "  SpO2: 98%   Weight: 61.2 kg (135 lb)   Height: 1.53 m (5' 0.24\")       Body mass index is 26.16 kg/m².    Physical Exam     Assessment & Plan     Diagnosis Plan   1. Hypothyroidism, unspecified type  TSH w reflex FT4      2. Polymyalgia rheumatica (CMS/HCC)        3. Age-related osteoporosis without current pathological fracture        4. Screening for deficiency anemia  CBC and Differential      5. Diabetes mellitus screening  Comprehensive metabolic panel      6. Vitamin D deficiency  Vitamin D 25 hydroxy      7. Screening, lipid  Lipid panel         Pleasant, healthy 89 years old female.  Exam was unremarkable.  To continue current medications.  The Shingrix vaccine was recommended.   Fasting lab work was ordered to be completed prior to her next visit in 10/2024.   Continuation of physical therapy was advised.    Follow-up  A follow-up appointment is scheduled for 10/2024 for a Medicare wellness visit.     I attest that this visit supports the complexity inherent to evaluation and management associated with medical care services that serve as the continuing focal point for all needed health care services and/or medical care services that are part of ongoing care related to this patient's single, serious condition or a complex condition.      Bharath Sinha MD          "

## 2024-08-02 RX ORDER — LEVOTHYROXINE SODIUM 75 UG/1
TABLET ORAL
Qty: 90 TABLET | Refills: 0 | Status: SHIPPED | OUTPATIENT
Start: 2024-08-02 | End: 2024-10-28

## 2024-08-16 NOTE — PROGRESS NOTES
Cardiology  Office Progress Note         Reason for visit:   Chief Complaint   Patient presents with    Follow-up       HPI     Catia Ward is a 89 y.o. female who presents to the office for cardiovascular follow up and management of AI and CVA s/p ILR 3/25/22 with no afib to date.     She was last seen in the office 3/11/24 and denied any cardiac complaints. She was taking her Crestor 10 mg daily and felt well. I asked her to return in 6 months for TTE for surveillance of her mild to mod AI.     She returns today in follow up for TTE. She denies any cardiac complaints today and is taking her medication as prescribed. She notes that her Bp is elevated for her today. She is typically 120 at her doctors visit.     Past Medical History:   Diagnosis Date    Atopic keratoconjunctivitis     Corneal ulcer of both eyes     Femur fracture (CMS/HCC)     Hypothyroidism     Polymyalgia rheumatica (CMS/HCC)     Rib fractures 06/2023    S/P knee replacement 2012    Shoulder fracture, left        Past Surgical History:   Procedure Laterality Date    ADENOIDECTOMY      CATARACT EXTRACTION      CORNEAL TRANSPLANT Left     Feburary,2023    JOINT REPLACEMENT      KERATOPLASTY      TONSILLECTOMY         Social History     Tobacco Use    Smoking status: Never    Smokeless tobacco: Never   Vaping Use    Vaping Use: Never used   Substance Use Topics    Alcohol use: Yes     Comment: social - maybe 3-4 drinks/week    Drug use: No       Family History   Problem Relation Age of Onset    Diabetes Biological Brother     Lung cancer Biological Brother        Allergies:  Codeine, Gentamicin, and Gentamicin sulfate    Current Outpatient Medications   Medication Sig Dispense Refill    aspirin 81 mg enteric coated tablet Take 1 tablet (81 mg total) by mouth daily. 30 tablet 0    betamethasone valerate (VALISONE) 0.1 % cream Apply 1 application. topically as needed for irritation (eczema). 45 g 1    calcium carbonate-vitamin D3 600 mg-10 mcg  (400 unit) capsule Take 1 tablet by mouth 2 (two) times a day.      cyanocobalamin (VITAMIN B12) 100 mcg tablet Take 100 mcg by mouth daily.      ibandronate (BONIVA) 150 mg tablet Take 1 tablet (150 mg total) by mouth every 30 (thirty) days. Take in AM with glass of water prior to food, don't lie down for 30 minutes. 3 tablet 2    levothyroxine (SYNTHROID) 75 mcg tablet TAKE ONE TABLET BY MOUTH ONE TIME DAILY 90 tablet 0    prednisoLONE acetate (PRED FORTE) 1 % ophthalmic suspension Administer 1 drop into both eyes 4 (four) times a day. ** Shake well before administration **      raloxifene (EVISTA) 60 mg tablet Take 1 tablet (60 mg total) by mouth daily. 90 tablet 0    rosuvastatin (CRESTOR) 10 mg tablet Take 1 tablet (10 mg total) by mouth daily. 90 tablet 3    valACYclovir (VALTREX) 1 gram tablet Take 1,000 mg by mouth daily. To prevent herpes infection in her eyes   Because she is on fml eye drops       No current facility-administered medications for this visit.       Review of Systems   Constitutional: Negative for malaise/fatigue.   Cardiovascular:  Negative for chest pain, dyspnea on exertion and irregular heartbeat.   Respiratory:  Negative for shortness of breath and sleep disturbances due to breathing.    Neurological:  Negative for dizziness and light-headedness.       Objective     Vitals:    09/09/24 1149   BP: 134/64   Pulse: (!) 53   SpO2: 96%       Wt Readings from Last 3 Encounters:   09/09/24 61.7 kg (136 lb)   09/09/24 62 kg (136 lb 11.2 oz)   08/01/24 61.2 kg (135 lb)       Body mass index is 26.35 kg/m².    Physical Exam  Vitals reviewed.   Cardiovascular:      Rate and Rhythm: Normal rate and regular rhythm.      Pulses: Normal pulses.           Carotid pulses are 2+ on the right side and 2+ on the left side.     Heart sounds: Normal heart sounds.   Pulmonary:      Effort: Pulmonary effort is normal.   Musculoskeletal:         General: Normal range of motion.   Skin:     General: Skin is  warm.      Capillary Refill: Capillary refill takes less than 2 seconds.   Neurological:      Mental Status: She is alert and oriented to person, place, and time.         ECG   Sinus bradycardia  Otherwise normal ECG  When compared with ECG of 11-MAR-2024 09:49,  No significant change was found     Hematology  Lab Results   Component Value Date    WBC 7.09 10/13/2023    HGB 14.7 10/13/2023    HCT 45.6 (H) 10/13/2023     10/13/2023    INR 1.0 03/23/2022       Chemistries  Lab Results   Component Value Date     10/13/2023    K 4.3 10/13/2023     10/13/2023    CREATININE 0.8 10/13/2023    BUN 21 10/13/2023    CO2 28 10/13/2023    GLUCOSE 100 (H) 10/13/2023    CALCIUM 9.7 10/13/2023    ALT 15 10/13/2023    AST 28 10/13/2023       Cholesterol  Lab Results   Component Value Date    CHOL 130 10/13/2023    TRIG 63 10/13/2023    HDL 67 10/13/2023    LDLCALC 50 10/13/2023    NONHDLCALC 63 10/13/2023       Endocrine  Lab Results   Component Value Date    TSH 1.60 10/13/2023    HGBA1C 5.8 (H) 04/03/2023       Cardiac Imaging    TRANSTHORACIC ECHO (TTE) COMPLETE 03/24/2022    Interpretation Summary  · Normal-sized LV. Preserved LV systolic function. Estimated EF 65%. No regional wall motion abnormalities. Normal LV wall thickness. Normal diastolic filling pattern for age of the LV.  · Normal-sized RV. Normal RV systolic function.  · Normal-sized LA. Normal-sized RA.  · Tricuspid aortic valve. Sclerotic aortic valve leaflets. Mild to moderate aortic valve regurgitation. No aortic valve stenosis. Aortic root normal. Ascending aorta normal-sized.  · Mild mitral annular calcification. Mild mitral valve regurgitation. No significant mitral valve stenosis.  · Mild tricuspid valve regurgitation. Estimated RVSP = 26 mmHg.  · Pulmonic valve not well visualized.  · Normal-sized IVC. IVC demonstrates normal respiratory collapse.  · No evidence of pericardial effusion.      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 Will continue to monitor     Hyperlipidemia  LDL 42 on 7/7/22  Subjective stiffness. She wanted to try lower dose of crestor  Started crestor 10 in place of 20  Recheck lipid panel and f/u 8 mo with TTE for valvular disease  LDL 50 on 10/13/23  PMR likely etiology of her stiffness  She has a lipid panel scheduled for prior to Dr. Sinha's visit in October     Nonrheumatic aortic valve insufficiency  Mild-moderate AI on 3/24/2022 TTE  Also mild MR, mild TR  9/9/24 TTE: mild AI, AS, MR, TR. Will monitor with interval visits      I attest that this visit supports the complexity inherent to evaluation and management associated with medical care services that serve as the continuing focal point for all needed health care services and/or medical care services that are part of ongoing care related to this patient's single, serious, or complex condition.      Orders Placed This Encounter   Procedures    ECG 12 lead     Scheduling Instructions:      PLEASE USE THIS ORDER FOR ECG'S PERFORMED IN PHYSICIAN OFFICES     Order Specific Question:   Release to patient     Answer:   Immediate     Order Specific Question:   Release to patient     Answer:   Immediate [1]       Follow Up Plans:  Return in about 6 months (around 3/9/2025).          IYara, am scribing for, and in the presence of, Xavier Barry MD.     I, Xavier Barry MD, personally performed the services described in this documentation as scribed by Yara Moise in my presence, and it is both accurate and  complete.     Xavier Barry MD  9/9/2024

## 2024-09-09 ENCOUNTER — OFFICE VISIT (OUTPATIENT)
Dept: CARDIOLOGY | Facility: CLINIC | Age: 89
End: 2024-09-09
Payer: MEDICARE

## 2024-09-09 ENCOUNTER — HOSPITAL ENCOUNTER (OUTPATIENT)
Dept: CARDIOLOGY | Facility: CLINIC | Age: 89
Discharge: HOME | End: 2024-09-09
Attending: INTERNAL MEDICINE
Payer: MEDICARE

## 2024-09-09 VITALS
WEIGHT: 136 LBS | HEART RATE: 53 BPM | DIASTOLIC BLOOD PRESSURE: 64 MMHG | OXYGEN SATURATION: 96 % | HEIGHT: 60 IN | SYSTOLIC BLOOD PRESSURE: 134 MMHG | BODY MASS INDEX: 26.7 KG/M2

## 2024-09-09 VITALS
HEIGHT: 60 IN | DIASTOLIC BLOOD PRESSURE: 58 MMHG | BODY MASS INDEX: 26.84 KG/M2 | WEIGHT: 136.7 LBS | SYSTOLIC BLOOD PRESSURE: 130 MMHG

## 2024-09-09 DIAGNOSIS — I35.1 NONRHEUMATIC AORTIC VALVE INSUFFICIENCY: Primary | ICD-10-CM

## 2024-09-09 DIAGNOSIS — I35.1 NONRHEUMATIC AORTIC VALVE INSUFFICIENCY: ICD-10-CM

## 2024-09-09 LAB
AORTIC ROOT ANNULUS: 3 CM
AORTIC VALVE MEAN VELOCITY: 1.76 M/S
AORTIC VALVE VELOCITY TIME INTEGRAL: 61.2 CM
ASCENDING AORTA: 3.3 CM
ATRIAL RATE: 54
AV MEAN GRADIENT: 14 MMHG
AV PEAK GRADIENT: 24 MMHG
AV PEAK VELOCITY-S: 2.44 M/S
AV REG PEAK VEL: 4.45 M/S
AV REGURGITATION PRESSURE HALF TIME: 553 MS
AV VALVE AREA INDEX: 0.79
AV VALVE AREA: 1 CM2
AV VELOCITY RATIO: 0.64
AVA (VTI): 1.28 CM2
BSA FOR ECHO PROCEDURE: 1.62 M2
CUSP SEPARATION: 1.7 CM
DOP CALC LVOT STROKE VOLUME: 78.37 CM3
E WAVE DECELERATION TIME: 239 MS
E/A RATIO: 0.8
E/E' RATIO: 9.9
E/LAT E' RATIO: 5.6
EDV (BP): 58 CM3
EF (A4C): 67.9 %
EF A2C: 59.1 %
EJECTION FRACTION: 65.3 %
EST RIGHT VENT SYSTOLIC PRESSURE BY TRICUSPID REGURGITATION JET: 31 MMHG
ESV (BP): 20.1 CM3
FRACTIONAL SHORTENING: 36.43 %
HEART RATE MV: 57 BPM
INTERVENTRICULAR SEPTUM: 0.93 CM
LA ESV (BP): 49.2 CM3
LA ESV INDEX (A2C): 35.86 CM3/M2
LA ESV INDEX (BP): 30.37 CM3/M2
LA/AORTA RATIO: 1.03
LAAS-AP2: 20.1 CM2
LAAS-AP4: 17 CM2
LAD 2D: 3.1 CM
LALD A4C: 5.51 CM
LALD A4C: 5.69 CM
LAV-S: 58.1 CM3
LEFT ATRIUM VOLUME INDEX: 25.06 CM3/M2
LEFT ATRIUM VOLUME: 40.6 CM3
LEFT INTERNAL DIMENSION IN SYSTOLE: 2.67 CM (ref 2.29–3.46)
LEFT VENTRICLE DIASTOLIC VOLUME INDEX: 38.4 CM3/M2
LEFT VENTRICLE DIASTOLIC VOLUME: 62.2 CM3
LEFT VENTRICLE SYSTOLIC VOLUME INDEX: 12.35 CM3/M2
LEFT VENTRICLE SYSTOLIC VOLUME: 20 CM3
LEFT VENTRICULAR INTERNAL DIMENSION IN DIASTOLE: 4.2 CM (ref 3.85–5.35)
LEFT VENTRICULAR POSTERIOR WALL IN END DIASTOLE: 0.8 CM (ref 0.5–0.92)
LV DIASTOLIC VOLUME: 45 CM3
LV ESV (APICAL 2 CHAMBER): 18.4 CM3
LVAD-AP2: 18.3 CM2
LVAD-AP4: 24 CM2
LVAS-AP2: 10.7 CM2
LVAS-AP4: 12.1 CM2
LVEDVI(A2C): 27.78 CM3/M2
LVEDVI(BP): 35.8 CM3/M2
LVESVI(A2C): 11.36 CM3/M2
LVESVI(BP): 12.41 CM3/M2
LVLD-AP2: 6.25 CM
LVLD-AP4: 7.54 CM
LVLS-AP2: 5.4 CM
LVLS-AP4: 6.05 CM
LVOT 2D: 1.6 CM
LVOT A: 2.01 CM2
LVOT MG: 6 MMHG
LVOT MV: 1.11 M/S
LVOT PEAK VELOCITY: 1.54 M/S
LVOT PG: 9 MMHG
LVOT STROKE VOLUME INDEX: 48.38 ML/M2
LVOT VTI: 39 CM
MLH CV ECHO AVA INDEX VELOCITY RATIO: 0.6
MV E'TISSUE VEL-LAT: 0.11 M/S
MV E'TISSUE VEL-MED: 0.06 M/S
MV MEAN GRADIENT: 1 MMHG
MV PEAK A VEL: 0.78 M/S
MV PEAK E VEL: 0.63 M/S
MV PEAK GRADIENT: 3 MMHG
MV STENOSIS PRESSURE HALF TIME: 70 MS
MV VALVE AREA BY CONTINUITY EQUATION: 4.33 CM2
MV VALVE AREA P 1/2 METHOD: 3.14 CM2
MV VTI: 18.1 CM
P AXIS: 62
PISA AR MAX VEL: 4.29 M/S
PISA AR MAX VEL: 4.6 M/S
POSTERIOR WALL: 0.8 CM
PR INTERVAL: 172
PV PEAK GRADIENT: 3 MMHG
PV PV: 0.92 M/S
QRS DURATION: 86
QT INTERVAL: 478
QTC CALCULATION(BAZETT): 453
R AXIS: 13
RAP: 3 MMHG
RVOT VMAX: 0.53 M/S
SEPTAL TISSUE DOPPLER FREE WALL LATE DIA VELOCITY (APICAL 4 CHAMBER VIEW): 0.13 M/S
T WAVE AXIS: 46
TR MAX PG: 28.3 MMHG
TRICUSPID VALVE PEAK REGURGITATION VELOCITY: 2.66 M/S
VENTRICULAR RATE: 54
Z-SCORE OF LEFT VENTRICULAR DIMENSION IN END DIASTOLE: -0.78
Z-SCORE OF LEFT VENTRICULAR DIMENSION IN END SYSTOLE: -0.42
Z-SCORE OF LEFT VENTRICULAR POSTERIOR WALL IN END DIASTOLE: 0.87

## 2024-09-09 PROCEDURE — 99214 OFFICE O/P EST MOD 30 MIN: CPT | Performed by: INTERNAL MEDICINE

## 2024-09-09 PROCEDURE — G2211 COMPLEX E/M VISIT ADD ON: HCPCS | Performed by: INTERNAL MEDICINE

## 2024-09-09 PROCEDURE — 93000 ELECTROCARDIOGRAM COMPLETE: CPT | Performed by: INTERNAL MEDICINE

## 2024-09-09 PROCEDURE — 93306 TTE W/DOPPLER COMPLETE: CPT | Performed by: INTERNAL MEDICINE

## 2024-09-09 RX ORDER — PNV NO.95/FERROUS FUM/FOLIC AC 28MG-0.8MG
100 TABLET ORAL DAILY
COMMUNITY

## 2024-09-09 ASSESSMENT — ENCOUNTER SYMPTOMS
DYSPNEA ON EXERTION: 0
IRREGULAR HEARTBEAT: 0
SHORTNESS OF BREATH: 0
DIZZINESS: 0
SLEEP DISTURBANCES DUE TO BREATHING: 0
LIGHT-HEADEDNESS: 0

## 2024-10-08 ENCOUNTER — LAB REQUISITION (OUTPATIENT)
Dept: LAB | Facility: HOSPITAL | Age: 89
End: 2024-10-08
Attending: INTERNAL MEDICINE
Payer: MEDICARE

## 2024-10-08 DIAGNOSIS — Z13.220 ENCOUNTER FOR SCREENING FOR LIPOID DISORDERS: ICD-10-CM

## 2024-10-08 DIAGNOSIS — E55.9 VITAMIN D DEFICIENCY, UNSPECIFIED: ICD-10-CM

## 2024-10-08 DIAGNOSIS — Z13.1 ENCOUNTER FOR SCREENING FOR DIABETES MELLITUS: ICD-10-CM

## 2024-10-08 DIAGNOSIS — E03.9 HYPOTHYROIDISM, UNSPECIFIED: ICD-10-CM

## 2024-10-08 DIAGNOSIS — Z13.0 ENCOUNTER FOR SCREENING FOR DISEASES OF THE BLOOD AND BLOOD-FORMING ORGANS AND CERTAIN DISORDERS INVOLVING THE IMMUNE MECHANISM: ICD-10-CM

## 2024-10-08 LAB
25(OH)D3 SERPL-MCNC: 90 NG/ML (ref 30–100)
ALBUMIN SERPL-MCNC: 4 G/DL (ref 3.5–5.7)
ALP SERPL-CCNC: 55 IU/L (ref 34–125)
ALT SERPL-CCNC: 16 IU/L (ref 7–52)
ANION GAP SERPL CALC-SCNC: 6 MEQ/L (ref 3–15)
AST SERPL-CCNC: 29 IU/L (ref 13–39)
BASOPHILS # BLD: 0.07 K/UL (ref 0.01–0.1)
BASOPHILS NFR BLD: 1.1 %
BILIRUB SERPL-MCNC: 1 MG/DL (ref 0.3–1.2)
BUN SERPL-MCNC: 16 MG/DL (ref 7–25)
CALCIUM SERPL-MCNC: 9.1 MG/DL (ref 8.6–10.3)
CHLORIDE SERPL-SCNC: 108 MEQ/L (ref 98–107)
CHOLEST SERPL-MCNC: 126 MG/DL
CO2 SERPL-SCNC: 29 MEQ/L (ref 21–31)
CREAT SERPL-MCNC: 0.8 MG/DL (ref 0.6–1.2)
DIFFERENTIAL METHOD BLD: ABNORMAL
EGFRCR SERPLBLD CKD-EPI 2021: >60 ML/MIN/1.73M*2
EOSINOPHIL # BLD: 0.19 K/UL (ref 0.04–0.36)
EOSINOPHIL NFR BLD: 2.9 %
ERYTHROCYTE [DISTWIDTH] IN BLOOD BY AUTOMATED COUNT: 13.3 % (ref 11.7–14.4)
GLUCOSE SERPL-MCNC: 94 MG/DL (ref 70–99)
HCT VFR BLD AUTO: 44.5 % (ref 35–45)
HDLC SERPL-MCNC: 57 MG/DL
HDLC SERPL: 2.2 {RATIO}
HGB BLD-MCNC: 14.5 G/DL (ref 11.8–15.7)
IMM GRANULOCYTES # BLD AUTO: 0.01 K/UL (ref 0–0.08)
IMM GRANULOCYTES NFR BLD AUTO: 0.2 %
LDLC SERPL CALC-MCNC: 46 MG/DL
LYMPHOCYTES # BLD: 2.5 K/UL (ref 1.2–3.5)
LYMPHOCYTES NFR BLD: 38.7 %
MCH RBC QN AUTO: 33.7 PG (ref 28–33.2)
MCHC RBC AUTO-ENTMCNC: 32.6 G/DL (ref 32.2–35.5)
MCV RBC AUTO: 103.5 FL (ref 83–98)
MONOCYTES # BLD: 0.55 K/UL (ref 0.28–0.8)
MONOCYTES NFR BLD: 8.5 %
NEUTROPHILS # BLD: 3.14 K/UL (ref 1.7–7)
NEUTS SEG NFR BLD: 48.6 %
NONHDLC SERPL-MCNC: 69 MG/DL
NRBC BLD-RTO: 0 %
PLATELET # BLD AUTO: 165 K/UL (ref 150–369)
PMV BLD AUTO: 11.5 FL (ref 9.4–12.3)
POTASSIUM SERPL-SCNC: 4.1 MEQ/L (ref 3.5–5.1)
PROT SERPL-MCNC: 6.1 G/DL (ref 6–8.2)
RBC # BLD AUTO: 4.3 M/UL (ref 3.93–5.22)
SODIUM SERPL-SCNC: 143 MEQ/L (ref 136–145)
TRIGL SERPL-MCNC: 116 MG/DL
TSH SERPL DL<=0.05 MIU/L-ACNC: 1.58 MIU/L (ref 0.34–5.6)
WBC # BLD AUTO: 6.46 K/UL (ref 3.8–10.5)

## 2024-10-08 PROCEDURE — 82040 ASSAY OF SERUM ALBUMIN: CPT | Performed by: INTERNAL MEDICINE

## 2024-10-08 PROCEDURE — 84443 ASSAY THYROID STIM HORMONE: CPT | Performed by: INTERNAL MEDICINE

## 2024-10-08 PROCEDURE — 82306 VITAMIN D 25 HYDROXY: CPT | Performed by: INTERNAL MEDICINE

## 2024-10-08 PROCEDURE — 36415 COLL VENOUS BLD VENIPUNCTURE: CPT | Performed by: INTERNAL MEDICINE

## 2024-10-08 PROCEDURE — 80053 COMPREHEN METABOLIC PANEL: CPT | Performed by: INTERNAL MEDICINE

## 2024-10-08 PROCEDURE — 80061 LIPID PANEL: CPT | Performed by: INTERNAL MEDICINE

## 2024-10-08 PROCEDURE — 85025 COMPLETE CBC W/AUTO DIFF WBC: CPT | Performed by: INTERNAL MEDICINE

## 2024-10-10 RX ORDER — RALOXIFENE HYDROCHLORIDE 60 MG/1
60 TABLET, FILM COATED ORAL DAILY
Qty: 90 TABLET | Refills: 0 | Status: SHIPPED | OUTPATIENT
Start: 2024-10-10 | End: 2025-01-13

## 2024-10-10 NOTE — TELEPHONE ENCOUNTER
Medicine Refill Request    Last Office Visit: 8/1/2024   Last Consult Visit: 12/8/2022  Last Telemedicine Visit: Visit date not found    Next Appointment: 10/21/2024      Current Outpatient Medications:     aspirin 81 mg enteric coated tablet, Take 1 tablet (81 mg total) by mouth daily., Disp: 30 tablet, Rfl: 0    betamethasone valerate (VALISONE) 0.1 % cream, Apply 1 application. topically as needed for irritation (eczema)., Disp: 45 g, Rfl: 1    calcium carbonate-vitamin D3 600 mg-10 mcg (400 unit) capsule, Take 1 tablet by mouth 2 (two) times a day., Disp: , Rfl:     cyanocobalamin (VITAMIN B12) 100 mcg tablet, Take 100 mcg by mouth daily., Disp: , Rfl:     ibandronate (BONIVA) 150 mg tablet, Take 1 tablet (150 mg total) by mouth every 30 (thirty) days. Take in AM with glass of water prior to food, don't lie down for 30 minutes., Disp: 3 tablet, Rfl: 2    levothyroxine (SYNTHROID) 75 mcg tablet, TAKE ONE TABLET BY MOUTH ONE TIME DAILY, Disp: 90 tablet, Rfl: 0    prednisoLONE acetate (PRED FORTE) 1 % ophthalmic suspension, Administer 1 drop into both eyes 4 (four) times a day. ** Shake well before administration **, Disp: , Rfl:     raloxifene (EVISTA) 60 mg tablet, Take 1 tablet (60 mg total) by mouth daily., Disp: 90 tablet, Rfl: 0    rosuvastatin (CRESTOR) 10 mg tablet, Take 1 tablet (10 mg total) by mouth daily., Disp: 90 tablet, Rfl: 3    valACYclovir (VALTREX) 1 gram tablet, Take 1,000 mg by mouth daily. To prevent herpes infection in her eyes  Because she is on fml eye drops, Disp: , Rfl:       BP Readings from Last 3 Encounters:   09/09/24 (!) 130/58   09/09/24 134/64   08/01/24 110/80       Recent Lab results:  Lab Results   Component Value Date    CHOL 126 10/08/2024   ,   Lab Results   Component Value Date    HDL 57 10/08/2024   ,   Lab Results   Component Value Date    LDLCALC 46 10/08/2024   ,   Lab Results   Component Value Date    TRIG 116 10/08/2024        Lab Results   Component Value Date     GLUCOSE 94 10/08/2024   ,   Lab Results   Component Value Date    HGBA1C 5.8 (H) 04/03/2023         Lab Results   Component Value Date    CREATININE 0.8 10/08/2024       Lab Results   Component Value Date    TSH 1.58 10/08/2024           Lab Results   Component Value Date    HGBA1C 5.8 (H) 04/03/2023

## 2024-10-21 ENCOUNTER — OFFICE VISIT (OUTPATIENT)
Dept: INTERNAL MEDICINE | Facility: CLINIC | Age: 89
End: 2024-10-21
Payer: MEDICARE

## 2024-10-21 VITALS
HEART RATE: 74 BPM | WEIGHT: 137 LBS | RESPIRATION RATE: 16 BRPM | DIASTOLIC BLOOD PRESSURE: 62 MMHG | OXYGEN SATURATION: 98 % | HEIGHT: 60 IN | BODY MASS INDEX: 26.9 KG/M2 | TEMPERATURE: 97.7 F | SYSTOLIC BLOOD PRESSURE: 118 MMHG

## 2024-10-21 DIAGNOSIS — Z00.00 MEDICARE ANNUAL WELLNESS VISIT, SUBSEQUENT: Primary | ICD-10-CM

## 2024-10-21 DIAGNOSIS — E78.2 MIXED HYPERLIPIDEMIA: ICD-10-CM

## 2024-10-21 DIAGNOSIS — H61.23 BILATERAL IMPACTED CERUMEN: ICD-10-CM

## 2024-10-21 DIAGNOSIS — R71.8 ELEVATED MCV: ICD-10-CM

## 2024-10-21 DIAGNOSIS — E03.9 HYPOTHYROIDISM, UNSPECIFIED TYPE: ICD-10-CM

## 2024-10-21 PROCEDURE — G0439 PPPS, SUBSEQ VISIT: HCPCS | Performed by: INTERNAL MEDICINE

## 2024-10-21 ASSESSMENT — MINI COG
COMPLETED: YES
TOTAL SCORE: 5

## 2024-10-21 ASSESSMENT — ENCOUNTER SYMPTOMS
EYE PAIN: 0
FREQUENCY: 0
SLEEP DISTURBANCE: 0
PHOTOPHOBIA: 0
MYALGIAS: 0
SEIZURES: 0
DIFFICULTY URINATING: 0
BACK PAIN: 0
CONFUSION: 0
APPETITE CHANGE: 0
SORE THROAT: 0
CHEST TIGHTNESS: 0
COUGH: 0
NECK STIFFNESS: 0
DIZZINESS: 0
ABDOMINAL PAIN: 0
LIGHT-HEADEDNESS: 0
HEMATURIA: 0
ACTIVITY CHANGE: 0
NECK PAIN: 0
BLOOD IN STOOL: 0
BRUISES/BLEEDS EASILY: 0
EYE ITCHING: 0
SINUS PAIN: 0
DIARRHEA: 0
SHORTNESS OF BREATH: 0
SINUS PRESSURE: 0
POLYPHAGIA: 0
POLYDIPSIA: 0
WEAKNESS: 0
STRIDOR: 0
HALLUCINATIONS: 0
FEVER: 0
ADENOPATHY: 0
NUMBNESS: 1
VOICE CHANGE: 0
ABDOMINAL DISTENTION: 0
EYE REDNESS: 0
COLOR CHANGE: 0
TREMORS: 0
FACIAL SWELLING: 0
ARTHRALGIAS: 1
JOINT SWELLING: 0
TROUBLE SWALLOWING: 0
CHILLS: 0
EYE DISCHARGE: 0
UNEXPECTED WEIGHT CHANGE: 0
DECREASED CONCENTRATION: 0
PALPITATIONS: 0
FACIAL ASYMMETRY: 0
HYPERACTIVE: 0
FLANK PAIN: 0
NERVOUS/ANXIOUS: 0
CONSTIPATION: 0
VOMITING: 0
WHEEZING: 0
FATIGUE: 0
SPEECH DIFFICULTY: 0
HEADACHES: 0
DYSURIA: 0
NAUSEA: 0
RHINORRHEA: 0

## 2024-10-21 ASSESSMENT — PATIENT HEALTH QUESTIONNAIRE - PHQ9: SUM OF ALL RESPONSES TO PHQ9 QUESTIONS 1 & 2: 0

## 2024-10-21 NOTE — PROGRESS NOTES
Consent obtained from patient and all parties present in the room? yes     I have obtained the consent of everyone present in the room to make an audio recording of this visit to assist me in documenting the encounter in the EMR.     Chief Complaint   Patient presents with    Medicare Subsequent Annual Wellness Visit      History of Present Illness  The patient presents for a wellness visit.    She suspects she has wax in her ears.     She is uncertain about her living will or advanced directive status.  Prepare pamphlet provided.    She experiences tingling and numbness in her feet, a condition she has been dealing with for several years. She is currently taking a B12 supplement.  Patient had adequate vitamin B12 level per previous lab results.  She describes a sensation of stiffness in her toes when moving them up and down. She uses a walker for mobility.  She walks several laps her walker at [facility]..  She has macular degeneration in her left eye and keratoconus in her left eye follow-up with retina specialist every 3 months.    IMMUNIZATIONS  She has received her influenza vaccine and her last COVID-19 vaccine.    Review of Systems   Constitutional:  Negative for activity change, appetite change, chills, fatigue, fever and unexpected weight change.   HENT:  Positive for hearing loss. Negative for congestion, dental problem, ear discharge, ear pain, facial swelling, nosebleeds, postnasal drip, rhinorrhea, sinus pressure, sinus pain, sneezing, sore throat, tinnitus, trouble swallowing and voice change.    Eyes:  Positive for visual disturbance (See HPI). Negative for photophobia, pain, discharge, redness and itching.   Respiratory:  Negative for cough, chest tightness, shortness of breath, wheezing and stridor.    Cardiovascular:  Negative for chest pain, palpitations and leg swelling.   Gastrointestinal:  Negative for abdominal distention, abdominal pain, blood in stool, constipation, diarrhea,  nausea and vomiting.   Endocrine: Negative for cold intolerance, heat intolerance, polydipsia, polyphagia and polyuria.   Genitourinary:  Negative for decreased urine volume, difficulty urinating, dyspareunia, dysuria, flank pain, frequency, hematuria, menstrual problem, vaginal bleeding and vaginal pain.   Musculoskeletal:  Positive for arthralgias. Negative for back pain, gait problem, joint swelling, myalgias, neck pain and neck stiffness.   Skin:  Negative for color change, pallor and rash.   Allergic/Immunologic: Negative for environmental allergies, food allergies and immunocompromised state.   Neurological:  Positive for numbness (See HPI). Negative for dizziness, tremors, seizures, syncope, facial asymmetry, speech difficulty, weakness, light-headedness and headaches.   Hematological:  Negative for adenopathy. Does not bruise/bleed easily.   Psychiatric/Behavioral:  Negative for behavioral problems, confusion, decreased concentration, hallucinations, self-injury and sleep disturbance. The patient is not nervous/anxious and is not hyperactive.           Ingrid Ward is a 89 y.o. female who presents for a subsequent annual wellness visit.     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    Cornea ulcer, left    Cystoid macular edema, right    Epiretinal membrane, right eye    Keratoconus of both eyes    Pseudophakia, both eyes    PVD (posterior vitreous detachment), right eye    Wedging of vertebra (CMS/HCC)    Compression fracture of body of thoracic vertebra (CMS/HCC)    Age-related osteoporosis with  current pathological fracture    Hypothyroidism    Chronic kidney disease, stage 3a (CMS/HCC)    Transient left leg weakness    Osteoporosis    Nonrheumatic mitral valve regurgitation    Abnormal ECG    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)    Cryptogenic stroke (CMS/HCC)    Polymyalgia rheumatica (CMS/HCC)    Nonrheumatic aortic valve insufficiency    Preop cardiovascular exam    Hyperlipidemia    Other specified peripheral vascular diseases (CMS/HCC)     Past Medical History:   Diagnosis Date    Atopic keratoconjunctivitis     Corneal ulcer of both eyes     Femur fracture (CMS/HCC)     Hypothyroidism     Polymyalgia rheumatica (CMS/HCC)     Rib fractures 06/2023    S/P knee replacement 2012    Shoulder fracture, left      Past Surgical History   Procedure Laterality Date    Adenoidectomy      Cataract extraction      Corneal transplant Left     Feburary,2023    Joint replacement      Keratoplasty      LOOP RECORDER IMPLANT N/A 3/25/2022    Performed by Xavier Barry MD at  CARDIAC CATH/EP    Tonsillectomy       Allergies   Allergen Reactions    Codeine Nausea And Vomiting    Gentamicin GI intolerance    Gentamicin Sulfate Other (see comments)     Eye redness     Current Outpatient Medications   Medication Sig Dispense Refill    betamethasone valerate (VALISONE) 0.1 % cream Apply 1 application. topically as needed for irritation (eczema). 45 g 1    calcium carbonate-vitamin D3 600 mg-10 mcg (400 unit) capsule Take 1 tablet by mouth 2 (two) times a day.      cyanocobalamin (VITAMIN B12) 100 mcg tablet Take 100 mcg by mouth daily.      ibandronate (BONIVA) 150 mg tablet Take 1 tablet (150 mg total) by mouth every 30 (thirty) days. Take in AM with glass of water prior to food, don't lie down for 30 minutes. 3 tablet 2    levothyroxine (SYNTHROID) 75 mcg tablet TAKE ONE TABLET BY MOUTH ONE TIME DAILY 90 tablet 0    prednisoLONE acetate (PRED FORTE) 1 % ophthalmic suspension  Administer 1 drop into both eyes 4 (four) times a day. ** Shake well before administration **      raloxifene (EVISTA) 60 mg tablet Take 1 tablet (60 mg total) by mouth daily. 90 tablet 0    rosuvastatin (CRESTOR) 10 mg tablet Take 1 tablet (10 mg total) by mouth daily. 90 tablet 3    valACYclovir (VALTREX) 1 gram tablet Take 1,000 mg by mouth daily. To prevent herpes infection in her eyes   Because she is on fml eye drops      aspirin 81 mg enteric coated tablet Take 1 tablet (81 mg total) by mouth daily. 30 tablet 0     No current facility-administered medications for this visit.     Social History     Tobacco Use    Smoking status: Never    Smokeless tobacco: Never   Vaping Use    Vaping status: Never Used   Substance Use Topics    Alcohol use: Yes     Comment: social - maybe 3-4 drinks/week    Drug use: No     Family History   Problem Relation Name Age of Onset    Diabetes Biological Brother      Lung cancer Biological Brother         Objective   Vitals  Vitals:    10/21/24 1104   BP: 118/62   BP Location: Right upper arm   Patient Position: Sitting   Pulse: 74   Resp: 16   Temp: 36.5 °C (97.7 °F)   TempSrc: Oral   SpO2: 98%   Weight: 62.1 kg (137 lb)   Height: 1.524 m (5')     Body mass index is 26.76 kg/m².      Physical Exam  Nursing note reviewed.   Constitutional:       Appearance: Normal appearance.   HENT:      Head: Normocephalic.      Right Ear: There is impacted cerumen.      Left Ear: There is impacted cerumen.      Ears:      Comments: Wears hearing aid.  Bilateral cerumen was removed by ear curette without difficulties.  Postprocedure tympanic membranes were unremarkable.     Mouth/Throat:      Mouth: Mucous membranes are moist.   Eyes:      General:         Right eye: No discharge.      Extraocular Movements: Extraocular movements intact.      Right eye: Normal extraocular motion.      Left eye: Normal extraocular motion.      Conjunctiva/sclera: Conjunctivae normal.      Pupils: Pupils are equal,  round, and reactive to light.      Comments: Entropion of both upper lids.   Cardiovascular:      Rate and Rhythm: Normal rate and regular rhythm.      Pulses: Normal pulses.           Dorsalis pedis pulses are 2+ on the right side and 2+ on the left side.   Pulmonary:      Effort: Pulmonary effort is normal.      Breath sounds: Normal breath sounds.   Abdominal:      General: Abdomen is flat. Bowel sounds are normal.      Palpations: Abdomen is soft.   Musculoskeletal:         General: No swelling, tenderness or deformity.      Cervical back: Normal range of motion and neck supple.      Right lower leg: No edema.      Left lower leg: No edema.      Comments: Use rolling walker for support.   Feet:      Right foot:      Protective Sensation: 3 sites tested.  3 sites sensed.      Skin integrity: Skin integrity normal.      Toenail Condition: Right toenails are normal.      Left foot:      Protective Sensation: 3 sites tested.        Skin integrity: Skin integrity normal.      Toenail Condition: Left toenails are normal.   Skin:     General: Skin is warm.   Neurological:      General: No focal deficit present.      Mental Status: She is alert and oriented to person, place, and time. Mental status is at baseline.      Cranial Nerves: No cranial nerve deficit.      Sensory: No sensory deficit.      Gait: Gait normal.      Deep Tendon Reflexes: Reflexes normal.   Psychiatric:         Mood and Affect: Mood normal.         Behavior: Behavior normal.           Latest Reference Range & Units 10/08/24 08:20   Sodium 136 - 145 mEQ/L 143   Potassium, Bld 3.5 - 5.1 mEQ/L 4.1   Chloride 98 - 107 mEQ/L 108 (H)   CO2 21 - 31 mEQ/L 29   BUN 7 - 25 mg/dL 16   Creatinine 0.6 - 1.2 mg/dL 0.8   Glucose 70 - 99 mg/dL 94   Calcium 8.6 - 10.3 mg/dL 9.1   AST (SGOT) 13 - 39 IU/L 29   ALT (SGPT) 7 - 52 IU/L 16   Alkaline Phosphatase 34 - 125 IU/L 55   Total Protein 6.0 - 8.2 g/dL 6.1   Albumin 3.5 - 5.7 g/dL 4.0   Bilirubin, Total 0.3 - 1.2  mg/dL 1.0   eGFR >=60.0 mL/min/1.73m*2 >60.0   Anion Gap 3 - 15 mEQ/L 6   Cholesterol <=200 mg/dL 126   Triglycerides <150 mg/dL 116   HDL >=50 mg/dL 57   LDL Calculated <=100 mg/dL 46   Non-HDL, Calculated mg/dL 69   RISK <=5.0  2.2   TSH 0.34 - 5.60 mIU/L 1.58   Vit D, 25-Hydroxy 30 - 100 ng/mL 90   WBC 3.80 - 10.50 K/uL 6.46   RBC 3.93 - 5.22 M/uL 4.30   Hemoglobin 11.8 - 15.7 g/dL 14.5   Hematocrit 35.0 - 45.0 % 44.5   MCV 83.0 - 98.0 fL 103.5 (H)   MCH 28.0 - 33.2 pg 33.7 (H)   MCHC 32.2 - 35.5 g/dL 32.6   RDW 11.7 - 14.4 % 13.3   Platelets 150 - 369 K/uL 165   MPV 9.4 - 12.3 fL 11.5   Differential Type  Auto   nRBC <=0.0 % 0.0   Immature Granulocytes % 0.2   Neutrophils % 48.6   Lymphocytes % 38.7   Monocytes % 8.5   Eosinophils % 2.9   Basophils % 1.1   Immature Granulocytes, Absolute 0.00 - 0.08 K/uL 0.01   Neutrophils, Absolute 1.70 - 7.00 K/uL 3.14   Lymphocytes, Absolute 1.20 - 3.50 K/uL 2.50   Monocytes, Absolute 0.28 - 0.80 K/uL 0.55   Eosinophils, Absolute 0.04 - 0.36 K/uL 0.19   Basophils, Absolute 0.01 - 0.10 K/uL 0.07     Advanced Care Plan  Does patient have advance directive?: No   Patient does not have Advance Directive: Unable to assess       Does patient have current OOH DNR form?: No   Patient does not have current OOH DNR form: Unable to assess       Does patient have current POLST?: No   Patient does not have current POLST: Patient/Family needs assistance with POLST: RN to contact Attending Provider for Palliative Care Consult Order, Unable to assess         PHQ  Will the patient answer the depression questions?: Yes   Little interest or pleasure in doing things: Not at all   Feeling down, depressed, or hopeless: Not at all   Depression Risk: 0         Mini Cog  Completed: Yes  Score: 5  Result: Negative      Get Up and Go  Result: Pass    STEADI Falls Risk  One or more falls in the last year: No           Has trouble stepping up onto a curb: No   Advised to use a cane or walker to get  around safely: Yes   Often has to rush to the toilet: No   Feels unsteady when walking: Yes   Has lost some feeling in feet: No   Often feels sad or depressed: No   Steadies self on furniture while walking at home: No   Takes medication that makes him/her feel lightheaded or more tired than usual: No   Worried about falling: Yes   Takes medicine to sleep or improve mood: No   Needs to push with hands when rising from a chair: No   Falls screen completed: Yes     Hearing and Vision Screening  No results found.  See HRA for relevant hearing screening response.    Diet and Exercise            1. Medicare annual wellness visit, subsequent (Primary)    89 years old female came for Medicare wellness checkup.  Patient has full ADL with the use of walker.  Advanced directive discussed.  PREPARE Pamphlet provided.  Patient received both COVID and influenza vaccine this fall.    2. Bilateral impacted cerumen    Cerumen was removed with ear curette.  Tympanic membranes were unremarkable.    3. Hypothyroidism, unspecified type    Controlled.  Continue current dose of levothyroxine.    4. Mixed hyperlipidemia    Controlled.  Continue Crestor.    5. Elevated MCV    Elevated MCV without anemia.  Patient had unremarkable B12 levels in the past.    Continue over-the-counter B12 supplement.    Follow-up in 6 months.    Bharath Sinha MD    See Patient Instructions (the written plan) which was given to the patient for PPPS and health risk factors with interventions.

## 2024-10-21 NOTE — PATIENT INSTRUCTIONS
Your Personalized Prevention Plan Services (PPPS)    Preventive Services Checklist (Assumes Average Risk Unless Otherwise Noted):    Health Maintenance Topics with due status: Overdue       Topic Date Due    Advance Care Planning Never done    Colorectal Cancer Screening Never done    Depression Screening Never done    Falls Risk Screening Never done    Zoster Vaccine 07/21/2017    Influenza Vaccine 08/01/2024    COVID-19 Vaccine 09/01/2024    Medicare Annual Wellness Visit 10/02/2024     Health Maintenance Topics with due status: Not Due       Topic Last Completion Date    DTaP, Tdap, and Td Vaccines 03/08/2016    Breast Cancer Screening 10/23/2023    DEXA Scan 10/23/2023     Health Maintenance Topics with due status: Completed       Topic Last Completion Date    Pneumococcal (65 years and older) 11/18/2015    RSV Vaccine 09/19/2023     Health Maintenance Topics with due status: Aged Out       Topic Date Due    Meningococcal ACWY Aged Out    RSV <20 months Aged Out    HIB Vaccines Aged Out    Hepatitis B Vaccines Aged Out    IPV Vaccines Aged Out    HPV Vaccines Aged Out       You May Be Eligible for These Additional Preventive Services   (Assumes Average Risk Unless Otherwise Noted)  Diabetes Screening Any 1 risk factor: hypertension, dyslipidemia, obesity, high glucose; or Any 2 risk factors: >=64yo, overweight, family history diabetes (covered every 6 months)   Hepatitis C Screening Any 1 risk factor: 1) blood transfusion before 1992,   2) current or past injection drug use (annually for high risk; if born between 8191-4110, see above for status).   Vaccine: Hepatitis B As necessary if at-risk: hemophilia, ESRD, diabetes, living with individual infected with hep B, healthcare worker with frequent contact with blood/bodily fluids (series covered once)   Sexually Transmitted Diseases (STDs) As necessary chlamydia, gonorrhea, syphilis, hepatitis B (covered annually)  HIV if any 1 risk factor  present: 1) <14yo or >64yo and at increased risk or 2) 15-64yo and ask for it (covered annually)   Lung Cancer Screening Low dose chest CT if all three risk factors: 1) 50-78yo, 2) smoker or quit within last 15y, 3) >=20 pack years (covered annually).  No results found for this or any previous visit.       Cholesterol Screening Both risk factors: 1) >=21yo and 2)  increased risk coronary artery disease (covered every 5 years).   Breast Cancer Screening Covered once 35-40yo, annually >=39yo (if >=49yo, see above for status).       Health Risk Factors with Personalized Education:  ----------------------------------------------------------------------------------------------------------------------  Controlling Your Cholesterol  Reduce the amount of saturated and trans fat in your diet.  Limit intake of red meat.  Consume only low-fat or non-fat/skim dairy.  Limit fried food.  Cook with vegetable oils.  Reduce your intake of sugary foods, sugary drinks and alcohol.  Eat a diet high in fruit, vegetables and whole grains.  Get protein from fish, poultry and a small portion of nuts.  Stay active.  Try to get at least 90 to 150 minutes of exercise per week.  Try brisk walking, swimming, bicycling or dancing.  Maintain a healthy weight by balancing your diet and exercise.  If you have been prescribed medication, take it regularly and exactly as prescribed. Let your PCP know if you have any problems or questions about your medication.  It’s important to know your cholesterol numbers.  When recommended by your PCP, get the cholesterol blood test.  ---------------------------------------------------------------------------------------------------------------------   Controlling Your Osteoporosis, Strengthening Your Bones  Try to get at least 90 to 150 minutes of weight-bearing exercise per week.  Ensure intake of at least 1200mg of calcium per day.  Eat foods high in calcium like milk and other dairy, green vegetables, fruit,  canned fish with soft and edible bones, nuts, calcium-set tofu.  Some foods are calcium-fortified, like bread, cereal, fruit juices and mineral water.  Help your body make vitamin D by getting 10-15 minutes per day of sunlight.    Ensure intake of at least 600IU of vitamin D per day.  Eat foods high in vitamin D like oily fish (salmon, sardines, mackerel) and eggs.  Some foods are fortified with vitamin D, like dairy and cereals.  Avoid high amounts of caffeine and salt, since they can cause the body to loose calcium.  Limit alcohol intake, since it is associated with weaker bones and is associated with falls and fractures.  Limit intake of fizzy drinks.  If you have been prescribed medication, take it regularly and exactly as prescribed.  Let your PCP know if you have any problems or questions about your medication  ----------------------------------------------------------------------------------------------------------------------  Reducing Your Risk of Falls  Tell your PCP if any of your medications make you feel tired, dizzy, lightheaded or off-balance.  Maintain coordination, flexibility and balance by ensuring regular physical activity.  Limit alcohol intake to 1 drink per day.  Consider avoiding all alcohol intake.  Ensure good vision.  Visit an ophthalmologist or optometrist regularly for vision screening or to make sure your glasses / contact lens prescription is correct.  If you need glasses or contacts, wear them.  When you get new glasses or contacts, take time to get used to them.  Do not wear sunglasses or tinted lenses when indoors.  Ensure good hearing.  Have your hearing checked if you are having trouble hearing, or family and friends think you cannot hear them.  If you need a hearing aid, be sure it fits well and wear it.  Get enough rest.  Ensure about 7-9 hours of sleep every day.  Get up slowly from your bed or chairs.  Do not start walking until you are sure you feel steady.  Wear non-skid,  rubber-soled, low-heeled shoes.  Do not walk in socks, or in shoes and slippers with smooth soles.  If your PCP or therapist recommends using a cane or walker, use it regularly.  Make your home safer.  Increase lighting throughout the house, especially at the top and bottom of stairs.  Ensure lighting is easily turned on when getting up in the middle of the night.  Make sure there are two secure rails on all stairs.  Install grab bars in the bathtub / shower and near the toilet.  Consider using a shower chair and / or a hand-held shower.  Spread sand or salt on icy surfaces.  Beware of wet surfaces, which can be icy.  Tell your PCP if you have fallen.

## 2024-10-28 RX ORDER — LEVOTHYROXINE SODIUM 75 UG/1
TABLET ORAL
Qty: 90 TABLET | Refills: 0 | Status: SHIPPED | OUTPATIENT
Start: 2024-10-28 | End: 2025-01-22

## 2024-10-28 NOTE — TELEPHONE ENCOUNTER
Medicine Refill Request    Last Office Visit: 10/21/2024   Last Consult Visit: 12/8/2022  Last Telemedicine Visit: Visit date not found    Next Appointment: 4/23/2025      Current Outpatient Medications:     aspirin 81 mg enteric coated tablet, Take 1 tablet (81 mg total) by mouth daily., Disp: 30 tablet, Rfl: 0    betamethasone valerate (VALISONE) 0.1 % cream, Apply 1 application. topically as needed for irritation (eczema)., Disp: 45 g, Rfl: 1    calcium carbonate-vitamin D3 600 mg-10 mcg (400 unit) capsule, Take 1 tablet by mouth 2 (two) times a day., Disp: , Rfl:     cyanocobalamin (VITAMIN B12) 100 mcg tablet, Take 100 mcg by mouth daily., Disp: , Rfl:     ibandronate (BONIVA) 150 mg tablet, Take 1 tablet (150 mg total) by mouth every 30 (thirty) days. Take in AM with glass of water prior to food, don't lie down for 30 minutes., Disp: 3 tablet, Rfl: 2    levothyroxine (SYNTHROID) 75 mcg tablet, TAKE ONE TABLET BY MOUTH ONE TIME DAILY, Disp: 90 tablet, Rfl: 0    prednisoLONE acetate (PRED FORTE) 1 % ophthalmic suspension, Administer 1 drop into both eyes 4 (four) times a day. ** Shake well before administration **, Disp: , Rfl:     raloxifene (EVISTA) 60 mg tablet, Take 1 tablet (60 mg total) by mouth daily., Disp: 90 tablet, Rfl: 0    rosuvastatin (CRESTOR) 10 mg tablet, Take 1 tablet (10 mg total) by mouth daily., Disp: 90 tablet, Rfl: 3    valACYclovir (VALTREX) 1 gram tablet, Take 1,000 mg by mouth daily. To prevent herpes infection in her eyes  Because she is on fml eye drops, Disp: , Rfl:       BP Readings from Last 3 Encounters:   10/21/24 118/62   09/09/24 (!) 130/58   09/09/24 134/64       Recent Lab results:  Lab Results   Component Value Date    CHOL 126 10/08/2024   ,   Lab Results   Component Value Date    HDL 57 10/08/2024   ,   Lab Results   Component Value Date    LDLCALC 46 10/08/2024   ,   Lab Results   Component Value Date    TRIG 116 10/08/2024        Lab Results   Component Value Date     GLUCOSE 94 10/08/2024   ,   Lab Results   Component Value Date    HGBA1C 5.8 (H) 04/03/2023         Lab Results   Component Value Date    CREATININE 0.8 10/08/2024       Lab Results   Component Value Date    TSH 1.58 10/08/2024           Lab Results   Component Value Date    HGBA1C 5.8 (H) 04/03/2023

## 2025-01-08 ENCOUNTER — OFFICE VISIT (OUTPATIENT)
Dept: UROGYNECOLOGY | Facility: CLINIC | Age: 89
End: 2025-01-08
Payer: MEDICARE

## 2025-01-08 VITALS
WEIGHT: 137 LBS | DIASTOLIC BLOOD PRESSURE: 72 MMHG | HEIGHT: 60 IN | BODY MASS INDEX: 26.9 KG/M2 | SYSTOLIC BLOOD PRESSURE: 138 MMHG

## 2025-01-08 DIAGNOSIS — N39.3 STRESS INCONTINENCE: ICD-10-CM

## 2025-01-08 DIAGNOSIS — N39.41 URGE INCONTINENCE: Primary | ICD-10-CM

## 2025-01-08 DIAGNOSIS — R31.29 MICROSCOPIC HEMATURIA: ICD-10-CM

## 2025-01-08 LAB
BACTERIA URNS QL MICRO: NORMAL /HPF
BILIRUB UR QL STRIP.AUTO: NEGATIVE MG/DL
BLOOD URINE, POC: POSITIVE
CLARITY UR REFRACT.AUTO: CLEAR
COLOR UR AUTO: YELLOW
EXPIRATION DATE: ABNORMAL
GLUCOSE UR STRIP.AUTO-MCNC: NEGATIVE MG/DL
HGB UR QL STRIP.AUTO: NEGATIVE
HYALINE CASTS #/AREA URNS LPF: NORMAL /LPF
KETONES UR STRIP.AUTO-MCNC: NEGATIVE MG/DL
LEUKOCYTE EST, POC: NEGATIVE
LEUKOCYTE ESTERASE UR QL STRIP.AUTO: NEGATIVE
Lab: ABNORMAL
NITRITE UR QL STRIP.AUTO: NEGATIVE
NITRITE, POC: NEGATIVE
PH UR STRIP.AUTO: 6 [PH]
POCT MANUFACTURER: ABNORMAL
PROT UR QL STRIP.AUTO: NEGATIVE
RBC #/AREA URNS HPF: NORMAL /HPF
SP GR UR REFRACT.AUTO: 1.02
SQUAMOUS URNS QL MICRO: NORMAL /HPF
UROBILINOGEN UR STRIP-ACNC: 0.2 EU/DL
WBC #/AREA URNS HPF: NORMAL /HPF

## 2025-01-08 PROCEDURE — 87086 URINE CULTURE/COLONY COUNT: CPT | Performed by: OBSTETRICS & GYNECOLOGY

## 2025-01-08 PROCEDURE — 81002 URINALYSIS NONAUTO W/O SCOPE: CPT | Mod: GC | Performed by: OBSTETRICS & GYNECOLOGY

## 2025-01-08 PROCEDURE — 99204 OFFICE O/P NEW MOD 45 MIN: CPT | Mod: 25,GC | Performed by: OBSTETRICS & GYNECOLOGY

## 2025-01-08 PROCEDURE — 81015 MICROSCOPIC EXAM OF URINE: CPT | Performed by: OBSTETRICS & GYNECOLOGY

## 2025-01-08 PROCEDURE — 51701 INSERT BLADDER CATHETER: CPT | Mod: GC | Performed by: OBSTETRICS & GYNECOLOGY

## 2025-01-08 PROCEDURE — 99459 PELVIC EXAMINATION: CPT | Mod: GC | Performed by: OBSTETRICS & GYNECOLOGY

## 2025-01-08 PROCEDURE — 81001 URINALYSIS AUTO W/SCOPE: CPT | Performed by: OBSTETRICS & GYNECOLOGY

## 2025-01-08 NOTE — LETTER
2025     Ramila Renner, YFN  [address]  [place] [address]    Patient: Catia Ward  YOB: 1932  Date of Visit: 2025      Dear Ms. Renner:    Thank you for referring Catia Ward to me for evaluation. Below are my notes for this consultation.    If you have questions, please do not hesitate to call me. I look forward to following your patient along with you.         Sincerely,         Enrique Mcgee MD PhD        CC: No Recipients    Sonali Aguilera MD  2025 10:10 AM  Signed  Ms. Ward is seen in consultation regarding urinary leakage at the request of Ramila Renner.      CC: Urine leakage     HPI: This is a 89 y.o.  who presents to discuss urinary leakage.    Urinary Symptoms: Patient reports urinary leakage for the past several years that occurs when she stands after prolonged periods of sitting. She does not feel an urge to void at the time of leakage. Sometimes will have leakage with coughing/sneezing if her bladder is full. She does not typically use a pad for protection unless she knows she will be out for several hours. She denies nocturnal enuresis and awakens only once per night to void.  She denies recurrent or frequent urinary tract infections, had no urinary tract infections in the last year, denies dysuria, and denies hematuria.    Prolapse Symptoms: None    Bowel Symptoms: None.  She denies splinting.  She denies anal incontinence.    Sexual Function: She is not sexually active.     She  has a past medical history of Atopic keratoconjunctivitis, Corneal ulcer of both eyes, Femur fracture (CMS/[facility]), Hypothyroidism, Polymyalgia rheumatica (CMS/[facility]), Rib fractures (2023), S/P knee replacement (), and Shoulder fracture, left.    She has no past medical history of Awareness under anesthesia, Delayed emergence from general anesthesia, Hard to intubate, or PONV (postoperative nausea and vomiting).    She  has a past surgical history that  includes Joint replacement; Tonsillectomy; Adenoidectomy; Cataract extraction; Keratoplasty; Corneal transplant (Left); Dilation and curettage of uterus; and Corneal transplant.      Current Outpatient Medications:   •  aspirin 81 mg enteric coated tablet, Take 1 tablet (81 mg total) by mouth daily., Disp: 30 tablet, Rfl: 0  •  betamethasone valerate (VALISONE) 0.1 % cream, Apply 1 application. topically as needed for irritation (eczema)., Disp: 45 g, Rfl: 1  •  calcium carbonate-vitamin D3 600 mg-10 mcg (400 unit) capsule, Take 1 tablet by mouth 2 (two) times a day., Disp: , Rfl:   •  cyanocobalamin (VITAMIN B12) 100 mcg tablet, Take 100 mcg by mouth daily., Disp: , Rfl:   •  ibandronate (BONIVA) 150 mg tablet, Take 1 tablet (150 mg total) by mouth every 30 (thirty) days. Take in AM with glass of water prior to food, don't lie down for 30 minutes., Disp: 3 tablet, Rfl: 2  •  levothyroxine (SYNTHROID) 75 mcg tablet, TAKE ONE TABLET BY MOUTH ONE TIME DAILY, Disp: 90 tablet, Rfl: 0  •  prednisoLONE acetate (PRED FORTE) 1 % ophthalmic suspension, Administer 1 drop into both eyes 4 (four) times a day. ** Shake well before administration **, Disp: , Rfl:   •  raloxifene (EVISTA) 60 mg tablet, Take 1 tablet (60 mg total) by mouth daily., Disp: 90 tablet, Rfl: 0  •  rosuvastatin (CRESTOR) 10 mg tablet, Take 1 tablet (10 mg total) by mouth daily., Disp: 90 tablet, Rfl: 3  •  valACYclovir (VALTREX) 1 gram tablet, Take 1,000 mg by mouth daily. To prevent herpes infection in her eyes  Because she is on fml eye drops, Disp: , Rfl:     She is allergic to codeine, gentamicin, and gentamicin sulfate.    Her social history is not contributory.    Her family history is not contributory.    Review of Systems    Review of Systems    ICIQ-UI Short Form    1. How often do you leak urine? several times a day (4)  2. How much urine do you usually leak? a small amount (2)  3. How much does leaking urine interfere with your everyday life?  2  4. When does urine leak (pick all that apply)? leaks before you can get to the toilet      Physical Exam    Vitals:    01/08/25 0918   BP: 138/72     Weight: 62.1 kg (137 lb)   Body mass index is 26.76 kg/m².   Constitutional: She is oriented to person, place, and time and well-developed, well-nourished, and in no distress.  Psychiatric: Mood, memory, affect and judgment normal.   Neurological: Pleasant and oriented.   Neck: Normal in appearance.   Back: No tenderness over the spine or CVA tenderness.  Cardiovascular: No JVD.  No lower extremity edema.  Pulmonary/Chest: Effort normal. No respiratory distress.   Skin: Skin is warm and dry. No rash noted. No pallor.   Abd: Soft, non tender.     Pelvic exam (with a female chaperone present):   Normal external genitalia, including clitoris, urethral meatus and perineal body.  BLAZE was not demonstrable with cough or Valsalva in the lithotomy position, approximately 10 minutes after her last spontaneous void.    PROCEDURE NOTE FOR POST-VOID RESIDUAL: (81841)  The urethra was prepped, and a lubricated 12 [place] catheter was inserted to collect a post void residual.  The procedure was well tolerated by the patient  The post void residual amount is 50 mL.     A point-of-care urinalysis was ordered: positive for heme, negative for leukocyte esterase, negative for nitrites detected.    Speculum examination: Vaginal epithelium was atrophic.  The cervix was non-tender.  Bimanual exam: The uterus was mobile and non-tender.  There were no masses or tenderness in the pelvis/adnexal region.  Rectal exam: Normally-situated anus.  HOLLAND deferred.    POP-Q:  Physical Exam    Genitourinary: POP-Q measurements were:      Aa: -1, Ba: -1, C: -3     gH: 1.5, pB: 2, TVL: 10     Ap: -2, Bp: -2, D: -5           Assessment/Plan    Urge incontinence  I have reviewed first line OAB therapy including weight loss with diet and exercise, caffeine reduction, fluid management, bladder retraining, and  pelvic muscle exercises.    Stress incontinence  Ms. Ward has stress urinary incontinence by history. Her post void residual is within normal limits. Her symptoms do not occur frequently. Reviewed behavioral modifications and pelvic exercises. We discussed expectant management, conservative management, weight loss, urethral bulking, and sling surgery. Patient education was provided regarding surgical and non-surgical options for stress urinary incontinence, including risks and complications. Educational handouts provided. She will call the office if she decides she would like to move forward with any of these options         Microscopic hematuria  I explained to Ms. Ward that her urine has been sent for formal evaluation. If hematuria is detected in the absence of an infection, the recommended workup includes imaging of the upper renal tracts via CT urogram or ultrasound, and cystoscopy in order to look for tumors, stones, or other anatomic lesions. I will follow-up on the results of her urine studies, and if hematuria is detected, I will let Catia know and will move forward with the appropriate tests for evaluation.      Return if symptoms worsen or fail to improve.       Sonali Aguilera MD

## 2025-01-08 NOTE — PATIENT INSTRUCTIONS
If you have any problems or concerns, call our office at [phone].  If you think you are having a medical emergency, please call [phone].    If you have access to [employer] (the online patient portal), results from tests ordered at your visit (such as urine tests or ultrasounds) will appear in your portal as soon as they are ready. Please understand that you may see these results faster than we do. Please do not call about the results immediately - we will review your results and contact you after we have had time to analyze your tests.     Please also understand that we frequently do not receive messages sent to us by patients through [employer]. If you have questions or concerns, please call our office.

## 2025-01-08 NOTE — ASSESSMENT & PLAN NOTE
I have reviewed first line OAB therapy including weight loss with diet and exercise, caffeine reduction, fluid management, bladder retraining, and pelvic muscle exercises.

## 2025-01-08 NOTE — ASSESSMENT & PLAN NOTE
Ms. Ward has stress urinary incontinence by history. Her post void residual is within normal limits. Her symptoms do not occur frequently. Reviewed behavioral modifications and pelvic exercises. We discussed expectant management, conservative management, weight loss, urethral bulking, and sling surgery. Patient education was provided regarding surgical and non-surgical options for stress urinary incontinence, including risks and complications. Educational handouts provided. She will call the office if she decides she would like to move forward with any of these options

## 2025-01-08 NOTE — ASSESSMENT & PLAN NOTE
I explained to Ms. Ward that her urine has been sent for formal evaluation. If hematuria is detected in the absence of an infection, the recommended workup includes imaging of the upper renal tracts via CT urogram or ultrasound, and cystoscopy in order to look for tumors, stones, or other anatomic lesions. I will follow-up on the results of her urine studies, and if hematuria is detected, I will let Catia know and will move forward with the appropriate tests for evaluation.

## 2025-01-08 NOTE — PROGRESS NOTES
Ms. Ward is seen in consultation regarding urinary leakage at the request of Ramila Renner.      CC: Urine leakage     HPI: This is a 89 y.o.  who presents to discuss urinary leakage.    Urinary Symptoms: Patient reports urinary leakage for the past several years that occurs when she stands after prolonged periods of sitting. She does not feel an urge to void at the time of leakage. Sometimes will have leakage with coughing/sneezing if her bladder is full. She does not typically use a pad for protection unless she knows she will be out for several hours. She denies nocturnal enuresis and awakens only once per night to void.  She denies recurrent or frequent urinary tract infections, had no urinary tract infections in the last year, denies dysuria, and denies hematuria.    Prolapse Symptoms: None    Bowel Symptoms: None.  She denies splinting.  She denies anal incontinence.    Sexual Function: She is not sexually active.     She  has a past medical history of Atopic keratoconjunctivitis, Corneal ulcer of both eyes, Femur fracture (CMS/[facility]), Hypothyroidism, Polymyalgia rheumatica (CMS/[facility]), Rib fractures (2023), S/P knee replacement (), and Shoulder fracture, left.    She has no past medical history of Awareness under anesthesia, Delayed emergence from general anesthesia, Hard to intubate, or PONV (postoperative nausea and vomiting).    She  has a past surgical history that includes Joint replacement; Tonsillectomy; Adenoidectomy; Cataract extraction; Keratoplasty; Corneal transplant (Left); Dilation and curettage of uterus; and Corneal transplant.      Current Outpatient Medications:     aspirin 81 mg enteric coated tablet, Take 1 tablet (81 mg total) by mouth daily., Disp: 30 tablet, Rfl: 0    betamethasone valerate (VALISONE) 0.1 % cream, Apply 1 application. topically as needed for irritation (eczema)., Disp: 45 g, Rfl: 1    calcium carbonate-vitamin D3 600 mg-10 mcg (400 unit) capsule, Take 1 tablet  by mouth 2 (two) times a day., Disp: , Rfl:     cyanocobalamin (VITAMIN B12) 100 mcg tablet, Take 100 mcg by mouth daily., Disp: , Rfl:     ibandronate (BONIVA) 150 mg tablet, Take 1 tablet (150 mg total) by mouth every 30 (thirty) days. Take in AM with glass of water prior to food, don't lie down for 30 minutes., Disp: 3 tablet, Rfl: 2    levothyroxine (SYNTHROID) 75 mcg tablet, TAKE ONE TABLET BY MOUTH ONE TIME DAILY, Disp: 90 tablet, Rfl: 0    prednisoLONE acetate (PRED FORTE) 1 % ophthalmic suspension, Administer 1 drop into both eyes 4 (four) times a day. ** Shake well before administration **, Disp: , Rfl:     raloxifene (EVISTA) 60 mg tablet, Take 1 tablet (60 mg total) by mouth daily., Disp: 90 tablet, Rfl: 0    rosuvastatin (CRESTOR) 10 mg tablet, Take 1 tablet (10 mg total) by mouth daily., Disp: 90 tablet, Rfl: 3    valACYclovir (VALTREX) 1 gram tablet, Take 1,000 mg by mouth daily. To prevent herpes infection in her eyes  Because she is on fml eye drops, Disp: , Rfl:     She is allergic to codeine, gentamicin, and gentamicin sulfate.    Her social history is not contributory.    Her family history is not contributory.    Review of Systems    Review of Systems    ICIQ-UI Short Form    1. How often do you leak urine? several times a day (4)  2. How much urine do you usually leak? a small amount (2)  3. How much does leaking urine interfere with your everyday life? 2  4. When does urine leak (pick all that apply)? leaks before you can get to the toilet      Physical Exam    Vitals:    01/08/25 0918   BP: 138/72     Weight: 62.1 kg (137 lb)   Body mass index is 26.76 kg/m².   Constitutional: She is oriented to person, place, and time and well-developed, well-nourished, and in no distress.  Psychiatric: Mood, memory, affect and judgment normal.   Neurological: Pleasant and oriented.   Neck: Normal in appearance.   Back: No tenderness over the spine or CVA tenderness.  Cardiovascular: No JVD.  No lower  extremity edema.  Pulmonary/Chest: Effort normal. No respiratory distress.   Skin: Skin is warm and dry. No rash noted. No pallor.   Abd: Soft, non tender.     Pelvic exam (with a female chaperone present):   Normal external genitalia, including clitoris, urethral meatus and perineal body.  BLAZE was not demonstrable with cough or Valsalva in the lithotomy position, approximately 10 minutes after her last spontaneous void.    PROCEDURE NOTE FOR POST-VOID RESIDUAL: (31216)  The urethra was prepped, and a lubricated 12 Ch[place] catheter was inserted to collect a post void residual.  The procedure was well tolerated by the patient  The post void residual amount is 50 mL.     A point-of-care urinalysis was ordered: positive for heme, negative for leukocyte esterase, negative for nitrites detected.    Speculum examination: Vaginal epithelium was atrophic.  The cervix was non-tender.  Bimanual exam: The uterus was mobile and non-tender.  There were no masses or tenderness in the pelvis/adnexal region.  Rectal exam: Normally-situated anus.  HOLLAND deferred.    POP-Q:  Physical Exam    Genitourinary: POP-Q measurements were:      Aa: -1, Ba: -1, C: -3     gH: 1.5, pB: 2, TVL: 10     Ap: -2, Bp: -2, D: -5           Assessment/Plan     Urge incontinence  I have reviewed first line OAB therapy including weight loss with diet and exercise, caffeine reduction, fluid management, bladder retraining, and pelvic muscle exercises.    Stress incontinence  Ms. Ward has stress urinary incontinence by history. Her post void residual is within normal limits. Her symptoms do not occur frequently. Reviewed behavioral modifications and pelvic exercises. We discussed expectant management, conservative management, weight loss, urethral bulking, and sling surgery. Patient education was provided regarding surgical and non-surgical options for stress urinary incontinence, including risks and complications. Educational handouts provided. She will call  the office if she decides she would like to move forward with any of these options         Microscopic hematuria  I explained to Ms. Ward that her urine has been sent for formal evaluation. If hematuria is detected in the absence of an infection, the recommended workup includes imaging of the upper renal tracts via CT urogram or ultrasound, and cystoscopy in order to look for tumors, stones, or other anatomic lesions. I will follow-up on the results of her urine studies, and if hematuria is detected, I will let Catia know and will move forward with the appropriate tests for evaluation.      Return if symptoms worsen or fail to improve.       Sonali Aguilera MD     I saw and evaluated the patient.  I discussed the case with the Resident and agree with the findings and plan as documented in the note except for my comments below or within the additional notes today.    Enrique Mcgee MD PhD

## 2025-01-11 LAB — BACTERIA UR CULT: NORMAL

## 2025-01-13 RX ORDER — RALOXIFENE HYDROCHLORIDE 60 MG/1
60 TABLET, FILM COATED ORAL DAILY
Qty: 90 TABLET | Refills: 0 | Status: SHIPPED | OUTPATIENT
Start: 2025-01-13 | End: 2025-01-14 | Stop reason: SDUPTHER

## 2025-01-13 NOTE — TELEPHONE ENCOUNTER
Medicine Refill Request    Last Office Visit: 10/21/2024   Last Consult Visit: 12/8/2022  Last Telemedicine Visit: Visit date not found    Next Appointment: 4/23/2025      Current Outpatient Medications:     aspirin 81 mg enteric coated tablet, Take 1 tablet (81 mg total) by mouth daily., Disp: 30 tablet, Rfl: 0    betamethasone valerate (VALISONE) 0.1 % cream, Apply 1 application. topically as needed for irritation (eczema)., Disp: 45 g, Rfl: 1    calcium carbonate-vitamin D3 600 mg-10 mcg (400 unit) capsule, Take 1 tablet by mouth 2 (two) times a day., Disp: , Rfl:     cyanocobalamin (VITAMIN B12) 100 mcg tablet, Take 100 mcg by mouth daily., Disp: , Rfl:     ibandronate (BONIVA) 150 mg tablet, Take 1 tablet (150 mg total) by mouth every 30 (thirty) days. Take in AM with glass of water prior to food, don't lie down for 30 minutes., Disp: 3 tablet, Rfl: 2    levothyroxine (SYNTHROID) 75 mcg tablet, TAKE ONE TABLET BY MOUTH ONE TIME DAILY, Disp: 90 tablet, Rfl: 0    prednisoLONE acetate (PRED FORTE) 1 % ophthalmic suspension, Administer 1 drop into both eyes 4 (four) times a day. ** Shake well before administration **, Disp: , Rfl:     raloxifene (EVISTA) 60 mg tablet, Take 1 tablet (60 mg total) by mouth daily., Disp: 90 tablet, Rfl: 0    rosuvastatin (CRESTOR) 10 mg tablet, Take 1 tablet (10 mg total) by mouth daily., Disp: 90 tablet, Rfl: 3    valACYclovir (VALTREX) 1 gram tablet, Take 1,000 mg by mouth daily. To prevent herpes infection in her eyes  Because she is on fml eye drops, Disp: , Rfl:       BP Readings from Last 3 Encounters:   01/08/25 138/72   10/21/24 118/62   09/09/24 (!) 130/58       Recent Lab results:  Lab Results   Component Value Date    CHOL 126 10/08/2024   ,   Lab Results   Component Value Date    HDL 57 10/08/2024   ,   Lab Results   Component Value Date    LDLCALC 46 10/08/2024   ,   Lab Results   Component Value Date    TRIG 116 10/08/2024        Lab Results   Component Value Date     GLUCOSE 94 10/08/2024   ,   Lab Results   Component Value Date    HGBA1C 5.8 (H) 04/03/2023         Lab Results   Component Value Date    CREATININE 0.8 10/08/2024       Lab Results   Component Value Date    TSH 1.58 10/08/2024           Lab Results   Component Value Date    HGBA1C 5.8 (H) 04/03/2023

## 2025-01-14 RX ORDER — RALOXIFENE HYDROCHLORIDE 60 MG/1
60 TABLET, FILM COATED ORAL DAILY
Qty: 90 TABLET | Refills: 0 | Status: SHIPPED | OUTPATIENT
Start: 2025-01-14 | End: 2025-03-11

## 2025-01-14 NOTE — TELEPHONE ENCOUNTER
Medicine Refill Request    Last Office Visit: 10/21/2024   Last Consult Visit: 12/8/2022  Last Telemedicine Visit: Visit date not found    Next Appointment: 4/23/2025      Current Outpatient Medications:     aspirin 81 mg enteric coated tablet, Take 1 tablet (81 mg total) by mouth daily., Disp: 30 tablet, Rfl: 0    betamethasone valerate (VALISONE) 0.1 % cream, Apply 1 application. topically as needed for irritation (eczema)., Disp: 45 g, Rfl: 1    calcium carbonate-vitamin D3 600 mg-10 mcg (400 unit) capsule, Take 1 tablet by mouth 2 (two) times a day., Disp: , Rfl:     cyanocobalamin (VITAMIN B12) 100 mcg tablet, Take 100 mcg by mouth daily., Disp: , Rfl:     ibandronate (BONIVA) 150 mg tablet, Take 1 tablet (150 mg total) by mouth every 30 (thirty) days. Take in AM with glass of water prior to food, don't lie down for 30 minutes., Disp: 3 tablet, Rfl: 2    levothyroxine (SYNTHROID) 75 mcg tablet, TAKE ONE TABLET BY MOUTH ONE TIME DAILY, Disp: 90 tablet, Rfl: 0    prednisoLONE acetate (PRED FORTE) 1 % ophthalmic suspension, Administer 1 drop into both eyes 4 (four) times a day. ** Shake well before administration **, Disp: , Rfl:     raloxifene (EVISTA) 60 mg tablet, TAKE 1 TABLET DAILY, Disp: 90 tablet, Rfl: 0    rosuvastatin (CRESTOR) 10 mg tablet, Take 1 tablet (10 mg total) by mouth daily., Disp: 90 tablet, Rfl: 3    valACYclovir (VALTREX) 1 gram tablet, Take 1,000 mg by mouth daily. To prevent herpes infection in her eyes  Because she is on fml eye drops, Disp: , Rfl:       BP Readings from Last 3 Encounters:   01/08/25 138/72   10/21/24 118/62   09/09/24 (!) 130/58       Recent Lab results:  Lab Results   Component Value Date    CHOL 126 10/08/2024   ,   Lab Results   Component Value Date    HDL 57 10/08/2024   ,   Lab Results   Component Value Date    LDLCALC 46 10/08/2024   ,   Lab Results   Component Value Date    TRIG 116 10/08/2024        Lab Results   Component Value Date    GLUCOSE 94 10/08/2024   ,    Lab Results   Component Value Date    HGBA1C 5.8 (H) 04/03/2023         Lab Results   Component Value Date    CREATININE 0.8 10/08/2024       Lab Results   Component Value Date    TSH 1.58 10/08/2024           Lab Results   Component Value Date    HGBA1C 5.8 (H) 04/03/2023

## 2025-01-22 RX ORDER — LEVOTHYROXINE SODIUM 75 UG/1
TABLET ORAL
Qty: 90 TABLET | Refills: 1 | Status: SHIPPED | OUTPATIENT
Start: 2025-01-22

## 2025-01-22 NOTE — TELEPHONE ENCOUNTER
Medicine Refill Request    Last Office Visit: 10/21/2024   Last Consult Visit: 12/8/2022  Last Telemedicine Visit: Visit date not found    Next Appointment: 4/23/2025      Current Outpatient Medications:     aspirin 81 mg enteric coated tablet, Take 1 tablet (81 mg total) by mouth daily., Disp: 30 tablet, Rfl: 0    betamethasone valerate (VALISONE) 0.1 % cream, Apply 1 application. topically as needed for irritation (eczema)., Disp: 45 g, Rfl: 1    calcium carbonate-vitamin D3 600 mg-10 mcg (400 unit) capsule, Take 1 tablet by mouth 2 (two) times a day., Disp: , Rfl:     cyanocobalamin (VITAMIN B12) 100 mcg tablet, Take 100 mcg by mouth daily., Disp: , Rfl:     levothyroxine (SYNTHROID) 75 mcg tablet, TAKE ONE TABLET BY MOUTH ONE TIME DAILY, Disp: 90 tablet, Rfl: 0    prednisoLONE acetate (PRED FORTE) 1 % ophthalmic suspension, Administer 1 drop into both eyes 4 (four) times a day. ** Shake well before administration **, Disp: , Rfl:     raloxifene (EVISTA) 60 mg tablet, Take 1 tablet (60 mg total) by mouth daily., Disp: 90 tablet, Rfl: 0    rosuvastatin (CRESTOR) 10 mg tablet, Take 1 tablet (10 mg total) by mouth daily., Disp: 90 tablet, Rfl: 3    valACYclovir (VALTREX) 1 gram tablet, Take 1,000 mg by mouth daily. To prevent herpes infection in her eyes  Because she is on fml eye drops, Disp: , Rfl:       BP Readings from Last 3 Encounters:   01/08/25 138/72   10/21/24 118/62   09/09/24 (!) 130/58       Recent Lab results:  Lab Results   Component Value Date    CHOL 126 10/08/2024   ,   Lab Results   Component Value Date    HDL 57 10/08/2024   ,   Lab Results   Component Value Date    LDLCALC 46 10/08/2024   ,   Lab Results   Component Value Date    TRIG 116 10/08/2024        Lab Results   Component Value Date    GLUCOSE 94 10/08/2024   ,   Lab Results   Component Value Date    HGBA1C 5.8 (H) 04/03/2023         Lab Results   Component Value Date    CREATININE 0.8 10/08/2024       Lab Results   Component Value Date     TSH 1.58 10/08/2024           Lab Results   Component Value Date    HGBA1C 5.8 (H) 04/03/2023

## 2025-03-11 RX ORDER — RALOXIFENE HYDROCHLORIDE 60 MG/1
60 TABLET, FILM COATED ORAL DAILY
Qty: 90 TABLET | Refills: 3 | Status: SHIPPED | OUTPATIENT
Start: 2025-03-11

## 2025-03-11 NOTE — TELEPHONE ENCOUNTER
Medicine Refill Request    Last Office Visit: 10/21/2024   Last Consult Visit: 12/8/2022  Last Telemedicine Visit: Visit date not found    Next Appointment: 4/23/2025      Current Outpatient Medications:     aspirin 81 mg enteric coated tablet, Take 1 tablet (81 mg total) by mouth daily., Disp: 30 tablet, Rfl: 0    betamethasone valerate (VALISONE) 0.1 % cream, Apply 1 application. topically as needed for irritation (eczema)., Disp: 45 g, Rfl: 1    calcium carbonate-vitamin D3 600 mg-10 mcg (400 unit) capsule, Take 1 tablet by mouth 2 (two) times a day., Disp: , Rfl:     cyanocobalamin (VITAMIN B12) 100 mcg tablet, Take 100 mcg by mouth daily., Disp: , Rfl:     levothyroxine (SYNTHROID) 75 mcg tablet, TAKE ONE TABLET BY MOUTH ONE TIME DAILY, Disp: 90 tablet, Rfl: 1    prednisoLONE acetate (PRED FORTE) 1 % ophthalmic suspension, Administer 1 drop into both eyes 4 (four) times a day. ** Shake well before administration **, Disp: , Rfl:     raloxifene (EVISTA) 60 mg tablet, Take 1 tablet (60 mg total) by mouth daily., Disp: 90 tablet, Rfl: 0    rosuvastatin (CRESTOR) 10 mg tablet, Take 1 tablet (10 mg total) by mouth daily., Disp: 90 tablet, Rfl: 3    valACYclovir (VALTREX) 1 gram tablet, Take 1,000 mg by mouth daily. To prevent herpes infection in her eyes  Because she is on fml eye drops, Disp: , Rfl:       BP Readings from Last 3 Encounters:   01/08/25 138/72   10/21/24 118/62   09/09/24 (!) 130/58       Recent Lab results:  Lab Results   Component Value Date    CHOL 126 10/08/2024   ,   Lab Results   Component Value Date    HDL 57 10/08/2024   ,   Lab Results   Component Value Date    LDLCALC 46 10/08/2024   ,   Lab Results   Component Value Date    TRIG 116 10/08/2024        Lab Results   Component Value Date    GLUCOSE 94 10/08/2024   ,   Lab Results   Component Value Date    HGBA1C 5.8 (H) 04/03/2023         Lab Results   Component Value Date    CREATININE 0.8 10/08/2024       Lab Results   Component Value Date     TSH 1.58 10/08/2024           Lab Results   Component Value Date    HGBA1C 5.8 (H) 04/03/2023       Lab Results   Component Value Date    EGFR >60.0 10/08/2024    EGFR >60.0 10/13/2023    EGFR >60.0 12/22/2022

## 2025-03-12 NOTE — PROGRESS NOTES
Cardiology  Office Progress Note         Reason for visit:   Chief Complaint   Patient presents with    Follow-up       HPI     Catia Ward is a 89 y.o. female who presents to the office for cardiovascular follow up and management of AI and CVA s/p ILR 3/25/22 with no afib to date.     She was last seen in the office for TTE 9/9/24. She denied any cardiac complaints and was taking her medications as prescribed. We continued to monitor her ILR for any afib due to her CVA. She had not afib thus far. Her most recent FLP showed an LDL 50 fform 10/23. Her TTE with our visit showed mild AI, AS, MR and TR. She was to follow up in 6 months with an updated FLP.     She returns today and is feeling well. She denies any cardiac complaints of chest pain, shortness of breath, lightheadedness or dizziness today. She is taking all of her medications as prescribed.     10/08/24 FLP , Tgl 116, HDL 57, LDL 46   CMP BUN 16, Creat 0.8 eGFR >60     Past Medical History:   Diagnosis Date    Atopic keratoconjunctivitis     Corneal ulcer of both eyes     Femur fracture (CMS/HCC)     Hypothyroidism     Polymyalgia rheumatica (CMS/HCC)     Rib fractures 06/2023    S/P knee replacement 2012    Shoulder fracture, left        Past Surgical History   Procedure Laterality Date    Adenoidectomy      Cataract extraction      Corneal transplant Left     Feburary,2023    Corneal transplant      Dilation and curettage of uterus      Joint replacement      Keratoplasty      LOOP RECORDER IMPLANT N/A 3/25/2022    Performed by Xavier Barry MD at  [facility] CATH/EP    Tonsillectomy         Social History     Tobacco Use    Smoking status: Never    Smokeless tobacco: Never   Vaping Use    Vaping status: Never Used   Substance Use Topics    Alcohol use: Yes     Comment: social - maybe 3-4 drinks/week    Drug use: No       Family History   Problem Relation Name Age of Onset    Diabetes Biological Brother      Lung cancer Biological Brother          Allergies:  Codeine, Gentamicin, and Gentamicin sulfate    Current Outpatient Medications   Medication Sig Dispense Refill    aspirin 81 mg enteric coated tablet Take 1 tablet (81 mg total) by mouth daily. 30 tablet 0    betamethasone valerate (VALISONE) 0.1 % cream Apply 1 application. topically as needed for irritation (eczema). 45 g 1    calcium carbonate-vitamin D3 600 mg-10 mcg (400 unit) capsule Take 1 tablet by mouth 2 (two) times a day.      cyanocobalamin (VITAMIN B12) 100 mcg tablet Take 100 mcg by mouth daily.      levothyroxine (SYNTHROID) 75 mcg tablet TAKE ONE TABLET BY MOUTH ONE TIME DAILY 90 tablet 1    prednisoLONE acetate (PRED FORTE) 1 % ophthalmic suspension Administer 1 drop into both eyes 4 (four) times a day. ** Shake well before administration **      raloxifene (EVISTA) 60 mg tablet TAKE 1 TABLET DAILY 90 tablet 3    rosuvastatin (CRESTOR) 10 mg tablet Take 1 tablet (10 mg total) by mouth daily. 90 tablet 3    valACYclovir (VALTREX) 1 gram tablet Take 1,000 mg by mouth daily. To prevent herpes infection in her eyes   Because she is on fml eye drops       No current facility-administered medications for this visit.       Review of Systems   Constitutional: Negative for malaise/fatigue.   Cardiovascular:  Negative for chest pain, dyspnea on exertion, irregular heartbeat, leg swelling and palpitations.   Respiratory:  Negative for shortness of breath and sleep disturbances due to breathing.    Neurological:  Negative for dizziness and light-headedness.       Objective     Vitals:    03/13/25 0846   BP: 112/68   Pulse: 82   SpO2: 95%       Wt Readings from Last 3 Encounters:   03/13/25 60.8 kg (134 lb)   01/08/25 62.1 kg (137 lb)   10/21/24 62.1 kg (137 lb)       Body mass index is 26.17 kg/m².    Physical Exam  Vitals reviewed.   Cardiovascular:      Rate and Rhythm: Normal rate and regular rhythm.      Pulses:           Carotid pulses are 2+ on the right side and 2+ on the left side.      Heart sounds: Normal heart sounds.   Pulmonary:      Effort: Pulmonary effort is normal.   Musculoskeletal:         General: Normal range of motion.   Skin:     General: Skin is warm.      Capillary Refill: Capillary refill takes less than 2 seconds.   Neurological:      Mental Status: She is alert and oriented to person, place, and time.         ECG   Normal  Sinus rhythm  Low voltage QRS   poor R wave progression       Hematology  Lab Results   Component Value Date    WBC 6.46 10/08/2024    HGB 14.5 10/08/2024    HCT 44.5 10/08/2024     10/08/2024    INR 1.0 03/23/2022       Chemistries  Lab Results   Component Value Date     10/08/2024    K 4.1 10/08/2024     (H) 10/08/2024    CREATININE 0.8 10/08/2024    BUN 16 10/08/2024    CO2 29 10/08/2024    GLUCOSE 94 10/08/2024    CALCIUM 9.1 10/08/2024    ALT 16 10/08/2024    AST 29 10/08/2024       Cholesterol  Lab Results   Component Value Date    CHOL 126 10/08/2024    TRIG 116 10/08/2024    HDL 57 10/08/2024    LDLCALC 46 10/08/2024    NONHDLCALC 69 10/08/2024       Endocrine  Lab Results   Component Value Date    TSH 1.58 10/08/2024    HGBA1C 5.8 (H) 04/03/2023       Cardiac Imaging    TRANSTHORACIC ECHO (TTE) COMPLETE 09/09/2024    Interpretation Summary    Left Ventricle: Normal ventricle size. Normal wall thickness. Preserved systolic function. Estimated EF 60-65%. No regional wall motion abnormalities. Normal diastolic filling pattern for age.    Right Ventricle: Normal ventricle size. Normal systolic function.    Left Atrium: Mildly dilated atrium.    Right Atrium: Normal sized atrium.    Aortic Valve: Tricuspid valve. Mild regurgitation. Mild stenosis. Peak velocity = 2.44 m/s. Mean gradient = 14.00 mmHg. Calculated area by cont eq = 1.28 cm2. Calculated dimensionless index = 0.64.    Mitral Valve: Normal leaflet structure. Normal leaflet motion. Trace regurgitation. No stenosis. Mean gradient = 1.00.    Tricuspid Valve: Trace regurgitation.  Estimated RVSP = 31 mmHg.    Pulmonic Valve: Trace regurgitation.    Aorta: Aortic root normal. Ascending aorta normal-sized.    IVC/SVC: Normal sized inferior vena cava. Inferior vena cava demonstrates normal respiratory collapse.    Pericardium: No evidence of pericardial effusion.      Assessment/Plan     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)  MRI brain: small acute ischemia in the right centrum semioval extending to the right precentral gyrus subcortical white matter and punctate focus of acute ischemia along the left postcentral gyrus cortex, concerning for an embolic event  -on ASA 81 mg daily per Neurology  -Continue crestor 20 mg daily   -S/p loop recorder placement 3/25/22  - s/p echo 3/24/22  No afib on ILR monitoring thus far. Will continue to monitor. I reviewed her most recent report in [place] today. No alerts/events thus far     Hyperlipidemia  LDL 42 on 7/7/22  Subjective stiffness. She wanted to try lower dose of crestor  Started crestor 10 in place of 20  Recheck lipid panel and f/u 8 mo with TTE for valvular disease  LDL 50 on 10/13/23  PMR was felt the likely etiology of her stiffness. This is improved/stable  10/8/24: LDL 46, HDL 57,      Nonrheumatic aortic valve insufficiency  Mild-moderate AI on 3/24/2022 TTE  Also mild MR, mild TR  9/9/24 TTE: mild AI, AS, MR, TR. Will monitor with interval visits  Repeat at next visit    Abnormal ECG  Low voltage, poor R wave progression  No TTE findings suggestive of amyloid. Will monitor with interval echo      I attest that this visit supports the complexity inherent to evaluation and management associated with medical care services that serve as the continuing focal point for all needed health care services and/or medical care services that are part of ongoing care related to this patient's single, serious, or complex condition.    No orders of the defined types were placed in this encounter.      There are  no discontinued medications.    Orders Placed This Encounter   Procedures    ECG 12 lead     Scheduling Instructions:      PLEASE USE THIS ORDER FOR ECG'S PERFORMED IN PHYSICIAN OFFICES     Order Specific Question:   Release to patient     Answer:   Immediate     Order Specific Question:   Release to patient     Answer:   Immediate [1]       Follow Up Plans:  No follow-ups on file.          Yara COON, am scribing for, and in the presence of, Xavier Barry MD.     IXavier MD, personally performed the services described in this documentation as scribed by Yara Moise in my presence, and it is both accurate and complete.     Yara Moise RN  3/13/2025

## 2025-03-13 ENCOUNTER — OFFICE VISIT (OUTPATIENT)
Dept: CARDIOLOGY | Facility: CLINIC | Age: 89
End: 2025-03-13
Payer: MEDICARE

## 2025-03-13 VITALS
BODY MASS INDEX: 26.31 KG/M2 | DIASTOLIC BLOOD PRESSURE: 68 MMHG | OXYGEN SATURATION: 95 % | HEART RATE: 82 BPM | HEIGHT: 60 IN | WEIGHT: 134 LBS | SYSTOLIC BLOOD PRESSURE: 112 MMHG

## 2025-03-13 DIAGNOSIS — I35.1 NONRHEUMATIC AORTIC VALVE INSUFFICIENCY: Primary | ICD-10-CM

## 2025-03-13 DIAGNOSIS — R94.31 ABNORMAL ECG: ICD-10-CM

## 2025-03-13 DIAGNOSIS — I34.0 NONRHEUMATIC MITRAL VALVE REGURGITATION: ICD-10-CM

## 2025-03-13 LAB
ATRIAL RATE: 78
P AXIS: 65
PR INTERVAL: 160
QRS DURATION: 72
QT INTERVAL: 392
QTC CALCULATION(BAZETT): 446
R AXIS: 2
T WAVE AXIS: 56
VENTRICULAR RATE: 78

## 2025-03-13 PROCEDURE — G2211 COMPLEX E/M VISIT ADD ON: HCPCS | Performed by: INTERNAL MEDICINE

## 2025-03-13 PROCEDURE — 93000 ELECTROCARDIOGRAM COMPLETE: CPT | Performed by: INTERNAL MEDICINE

## 2025-03-13 PROCEDURE — 99214 OFFICE O/P EST MOD 30 MIN: CPT | Performed by: INTERNAL MEDICINE

## 2025-03-13 ASSESSMENT — ENCOUNTER SYMPTOMS
SHORTNESS OF BREATH: 0
DYSPNEA ON EXERTION: 0
PALPITATIONS: 0
DIZZINESS: 0
IRREGULAR HEARTBEAT: 0
SLEEP DISTURBANCES DUE TO BREATHING: 0
LIGHT-HEADEDNESS: 0

## 2025-03-17 ENCOUNTER — TELEPHONE (OUTPATIENT)
Dept: INTERNAL MEDICINE | Facility: CLINIC | Age: 89
End: 2025-03-17
Payer: MEDICARE

## 2025-03-17 NOTE — TELEPHONE ENCOUNTER
Patient is asking for a refill of her Boniva to the [facility] it is not on her refill list she stated she has been taking it for years     [phone]

## 2025-03-18 RX ORDER — IBANDRONATE SODIUM 150 MG/1
150 TABLET, FILM COATED ORAL
Qty: 3 TABLET | Refills: 3 | Status: SHIPPED | OUTPATIENT
Start: 2025-03-18 | End: 2025-03-18 | Stop reason: SDUPTHER

## 2025-03-18 RX ORDER — IBANDRONATE SODIUM 150 MG/1
150 TABLET, FILM COATED ORAL
Qty: 3 TABLET | Refills: 3 | Status: SHIPPED | OUTPATIENT
Start: 2025-03-18 | End: 2026-03-18

## 2025-04-23 ENCOUNTER — OFFICE VISIT (OUTPATIENT)
Dept: INTERNAL MEDICINE | Facility: CLINIC | Age: 89
End: 2025-04-23
Payer: MEDICARE

## 2025-04-23 VITALS
WEIGHT: 137.2 LBS | BODY MASS INDEX: 26.93 KG/M2 | HEART RATE: 80 BPM | HEIGHT: 60 IN | TEMPERATURE: 98.6 F | DIASTOLIC BLOOD PRESSURE: 80 MMHG | OXYGEN SATURATION: 96 % | SYSTOLIC BLOOD PRESSURE: 118 MMHG

## 2025-04-23 DIAGNOSIS — E03.9 HYPOTHYROIDISM, UNSPECIFIED TYPE: ICD-10-CM

## 2025-04-23 DIAGNOSIS — Z12.31 BREAST CANCER SCREENING BY MAMMOGRAM: ICD-10-CM

## 2025-04-23 DIAGNOSIS — H90.3 SENSORINEURAL HEARING LOSS (SNHL) OF BOTH EARS: ICD-10-CM

## 2025-04-23 DIAGNOSIS — E78.2 MIXED HYPERLIPIDEMIA: ICD-10-CM

## 2025-04-23 DIAGNOSIS — M81.0 AGE-RELATED OSTEOPOROSIS WITHOUT CURRENT PATHOLOGICAL FRACTURE: ICD-10-CM

## 2025-04-23 DIAGNOSIS — H35.3211 EXUDATIVE AGE-RELATED MACULAR DEGENERATION, RIGHT EYE, WITH ACTIVE CHOROIDAL NEOVASCULARIZATION (CMS/HCC): ICD-10-CM

## 2025-04-23 DIAGNOSIS — H61.21 IMPACTED CERUMEN OF RIGHT EAR: Primary | ICD-10-CM

## 2025-04-23 PROBLEM — N18.31 CHRONIC KIDNEY DISEASE, STAGE 3A (CMS/HCC): Status: RESOLVED | Noted: 2021-04-08 | Resolved: 2025-04-23

## 2025-04-23 PROCEDURE — 99214 OFFICE O/P EST MOD 30 MIN: CPT | Mod: 25 | Performed by: INTERNAL MEDICINE

## 2025-04-23 PROCEDURE — 69210 REMOVE IMPACTED EAR WAX UNI: CPT | Performed by: INTERNAL MEDICINE

## 2025-04-23 ASSESSMENT — ENCOUNTER SYMPTOMS
FEVER: 0
CHILLS: 0
WEAKNESS: 0
POLYDIPSIA: 0
ACTIVITY CHANGE: 0
WHEEZING: 0
HEMATOLOGIC/LYMPHATIC NEGATIVE: 1
DIZZINESS: 0
SPEECH DIFFICULTY: 0
SHORTNESS OF BREATH: 0
ALLERGIC/IMMUNOLOGIC NEGATIVE: 1
COUGH: 0
ARTHRALGIAS: 1
POLYPHAGIA: 0
PALPITATIONS: 0
PSYCHIATRIC NEGATIVE: 1
GASTROINTESTINAL NEGATIVE: 1
NUMBNESS: 0

## 2025-04-23 NOTE — PROCEDURES
Ear Cerumen Removal    Date/Time: 4/23/2025 2:39 PM    Performed by: hBarath Sinha MD  Authorized by: Bharath Sinha MD    Consent:     Consent obtained:  Verbal    Consent given by:  Patient    Risks discussed:  Bleeding, infection and pain    Alternatives discussed:  No treatment  Universal protocol:     Patient identity confirmed:  Verbally with patient  Procedure details:     Location:  R ear    Procedure type: irrigation      Procedure outcomes: cerumen removed    Post-procedure details:     Inspection:  No bleeding    Procedure completion:  Tolerated  Comments:      Postprocedure tympanic membrane was unremarkable.

## 2025-04-23 NOTE — PROGRESS NOTES
Consent obtained from patient and all parties present in the room? yes     I have obtained the consent of everyone present in the room to make an audio recording of this visit to assist me in documenting the encounter in the EMR.      Chief Complaint   Patient presents with    Follow-up     Pt declined colonoscopy        History of Present Illness  The patient presents for a follow-up visit.    She reports no specific medical concerns at this time, except for a perceived need for ear cleaning due to occasional hearing difficulties. Uncertainty exists whether these difficulties are due to a hearing impairment or ear blockage.    She has  hypothyroidism, osteoporosis, hyperlipidemia.  She is compliant with medication.   Denies unexplained weight gain, weight loss, cold or heat intolerance.  As for osteoporosis, patient denies additional height loss or low back pain  For hyperlipidemia, patient denies any side effects from the medication.    History of corneal ulcer with status post left corneal transplant  and vision is reported to be about 20/30 to 20/25. Follow-up is maintained with Dr. Adrian Sykes and Dr. Hoover. No follow-up is occurring with a neurologist.        Past Medical History:   Diagnosis Date    Atopic keratoconjunctivitis     Corneal ulcer of both eyes     Femur fracture (CMS/[facility])     Hypothyroidism     Polymyalgia rheumatica (CMS/[facility])     Rib fractures 06/2023    S/P knee replacement 2012    Shoulder fracture, left        Current Outpatient Medications   Medication Sig Dispense Refill    aspirin 81 mg enteric coated tablet Take 1 tablet (81 mg total) by mouth daily. 30 tablet 0    betamethasone valerate (VALISONE) 0.1 % cream Apply 1 application. topically as needed for irritation (eczema). 45 g 1    calcium carbonate-vitamin D3 600 mg-10 mcg (400 unit) capsule Take 1 tablet by mouth 2 (two) times a day.      cyanocobalamin (VITAMIN B12) 100 mcg tablet Take 100 mcg by mouth daily.       ibandronate (BONIVA) 150 mg tablet Take 1 tablet (150 mg total) by mouth every 30 (thirty) days. Take in AM with glass of water prior to food, don't lie down for 30 minutes. 3 tablet 3    levothyroxine (SYNTHROID) 75 mcg tablet TAKE ONE TABLET BY MOUTH ONE TIME DAILY 90 tablet 1    prednisoLONE acetate (PRED FORTE) 1 % ophthalmic suspension Administer 1 drop into both eyes 4 (four) times a day. ** Shake well before administration **      raloxifene (EVISTA) 60 mg tablet TAKE 1 TABLET DAILY 90 tablet 3    rosuvastatin (CRESTOR) 10 mg tablet Take 1 tablet (10 mg total) by mouth daily. 90 tablet 3    valACYclovir (VALTREX) 1 gram tablet Take 1,000 mg by mouth daily. To prevent herpes infection in her eyes   Because she is on fml eye drops       No current facility-administered medications for this visit.       Review of Systems   Constitutional:  Negative for activity change, chills and fever.   HENT:  Positive for hearing loss.    Eyes:         HPI   Respiratory:  Negative for cough, shortness of breath and wheezing.    Cardiovascular:  Negative for chest pain, palpitations and leg swelling.   Gastrointestinal: Negative.    Endocrine: Negative for cold intolerance, heat intolerance, polydipsia, polyphagia and polyuria.   Genitourinary: Negative.    Musculoskeletal:  Positive for arthralgias.   Allergic/Immunologic: Negative.    Neurological:  Negative for dizziness, speech difficulty, weakness and numbness.   Hematological: Negative.    Psychiatric/Behavioral: Negative.          Vitals:    04/23/25 0910   BP: 118/80   BP Location: Left upper arm   Patient Position: Sitting   Pulse: 80   Temp: 37 °C (98.6 °F)   TempSrc: Oral   SpO2: 96%   Weight: 62.2 kg (137 lb 3.2 oz)   Height: 1.524 m (5')       Body mass index is 26.8 kg/m².    Physical Exam  Constitutional:       Appearance: Normal appearance.   HENT:      Right Ear: There is impacted cerumen.      Left Ear: Tympanic membrane normal.   Eyes:      Pupils: Pupils  are equal, round, and reactive to light.   Cardiovascular:      Rate and Rhythm: Normal rate and regular rhythm.   Pulmonary:      Effort: Pulmonary effort is normal.      Breath sounds: Normal breath sounds.   Abdominal:      General: Abdomen is flat.      Palpations: Abdomen is soft.   Musculoskeletal:         General: Normal range of motion.   Skin:     General: Skin is warm.   Neurological:      Mental Status: She is oriented to person, place, and time.   Psychiatric:         Mood and Affect: Mood normal.         Behavior: Behavior normal.        Assessment & Plan      1. Impacted cerumen of right ear (Primary)      Patient had successful cerumen removal with irrigation and curette.- Ear Cerumen Removal.  Postprocedure, tympanic membrane was unremarkable.    2. Sensorineural hearing loss (SNHL) of both ears    Uses bilateral hearing aid.    3. Hypothyroidism, unspecified type    Continue current dose of levothyroxine 75 mcg daily.    4. Mixed hyperlipidemia    Continue current Crestor 10 mg daily.    5. Age-related osteoporosis without current pathological fracture    Continue Boniva 150 mg weekly.    6. Exudative age-related macular degeneration, right eye, with active choroidal neovascularization (CMS/HCC)    Follow-up with ophthalmologist.    7. Breast cancer screening by mammogram    Medical need discussed.  Patient will like to have mammogram in 1 more time.  Order has been placed.    - BI SCREENING MAMMOGRAM BILATERAL(TOMOSYNTHESIS); Future     Follow-up in 6 months.    I attest that this visit supports the complexity inherent to evaluation and management associated with medical care services that serve as the continuing focal point for all needed health care services and/or medical care services that are part of ongoing care related to this patient's single, serious condition or a complex condition.     Bharath Sinha MD

## 2025-05-20 ENCOUNTER — OFFICE VISIT (OUTPATIENT)
Dept: INTERNAL MEDICINE | Facility: CLINIC | Age: 89
End: 2025-05-20
Payer: MEDICARE

## 2025-05-20 VITALS
HEIGHT: 60 IN | WEIGHT: 138.4 LBS | SYSTOLIC BLOOD PRESSURE: 120 MMHG | DIASTOLIC BLOOD PRESSURE: 80 MMHG | HEART RATE: 86 BPM | OXYGEN SATURATION: 92 % | TEMPERATURE: 99.5 F | BODY MASS INDEX: 27.17 KG/M2

## 2025-05-20 DIAGNOSIS — J40 BRONCHITIS: Primary | ICD-10-CM

## 2025-05-20 DIAGNOSIS — R05.1 ACUTE COUGH: ICD-10-CM

## 2025-05-20 LAB
FLUAV RNA SPEC QL NAA+PROBE: NEGATIVE
FLUBV RNA SPEC QL NAA+PROBE: NEGATIVE
RSV RNA SPEC QL NAA+PROBE: NEGATIVE
SARS-COV-2 RNA RESP QL NAA+PROBE: NEGATIVE

## 2025-05-20 PROCEDURE — 87637 SARSCOV2&INF A&B&RSV AMP PRB: CPT | Performed by: INTERNAL MEDICINE

## 2025-05-20 PROCEDURE — G2211 COMPLEX E/M VISIT ADD ON: HCPCS | Performed by: INTERNAL MEDICINE

## 2025-05-20 PROCEDURE — 99213 OFFICE O/P EST LOW 20 MIN: CPT | Performed by: INTERNAL MEDICINE

## 2025-05-20 RX ORDER — AZITHROMYCIN 250 MG/1
TABLET, FILM COATED ORAL
Qty: 6 TABLET | Refills: 0 | Status: SHIPPED | OUTPATIENT
Start: 2025-05-20 | End: 2025-05-25

## 2025-05-20 RX ORDER — PROMETHAZINE HYDROCHLORIDE AND DEXTROMETHORPHAN HYDROBROMIDE 6.25; 15 MG/5ML; MG/5ML
5 SYRUP ORAL EVERY 4 HOURS PRN
Qty: 118 ML | Refills: 0 | Status: SHIPPED | OUTPATIENT
Start: 2025-05-20 | End: 2025-05-27 | Stop reason: SDUPTHER

## 2025-05-27 DIAGNOSIS — R05.1 ACUTE COUGH: ICD-10-CM

## 2025-05-27 RX ORDER — PROMETHAZINE HYDROCHLORIDE AND DEXTROMETHORPHAN HYDROBROMIDE 6.25; 15 MG/5ML; MG/5ML
5 SYRUP ORAL EVERY 4 HOURS PRN
Qty: 118 ML | Refills: 0 | Status: SHIPPED | OUTPATIENT
Start: 2025-05-27 | End: 2025-06-06

## 2025-07-14 RX ORDER — LEVOTHYROXINE SODIUM 75 UG/1
75 TABLET ORAL DAILY
Qty: 90 TABLET | Refills: 0 | Status: SHIPPED | OUTPATIENT
Start: 2025-07-14

## 2025-07-16 NOTE — TELEPHONE ENCOUNTER
[facility] at [facility] ED Refill Request      MA Notes:      Nurse Notes:      Last Office Visit: 3/13/2025  Last Telemedicine Visit: Visit date not found    Next Office Visit: 9/29/2025  Next Telemedicine Visit: Visit date not found     Most Recent BP Readings:  BP Readings from Last 3 Encounters:   05/20/25 120/80   04/23/25 118/80   03/13/25 112/68       Most recent Lab results:  Lab Results   Component Value Date    CHOL 126 10/08/2024    HDL 57 10/08/2024    TRIG 116 10/08/2024    NONHDLCALC 69 10/08/2024       Lab Results   Component Value Date    AST 29 10/08/2024    ALT 16 10/08/2024       Lab Results   Component Value Date     10/08/2024    K 4.1 10/08/2024    BUN 16 10/08/2024    CREATININE 0.8 10/08/2024       Current Medications:    Current Outpatient Medications:     aspirin 81 mg enteric coated tablet, Take 1 tablet (81 mg total) by mouth daily., Disp: 30 tablet, Rfl: 0    betamethasone valerate (VALISONE) 0.1 % cream, Apply 1 application. topically as needed for irritation (eczema)., Disp: 45 g, Rfl: 1    calcium carbonate-vitamin D3 600 mg-10 mcg (400 unit) capsule, Take 1 tablet by mouth 2 (two) times a day., Disp: , Rfl:     cyanocobalamin (VITAMIN B12) 100 mcg tablet, Take 100 mcg by mouth daily., Disp: , Rfl:     ibandronate (BONIVA) 150 mg tablet, Take 1 tablet (150 mg total) by mouth every 30 (thirty) days. Take in AM with glass of water prior to food, don't lie down for 30 minutes., Disp: 3 tablet, Rfl: 3    levothyroxine (SYNTHROID) 75 mcg tablet, TAKE ONE TABLET BY MOUTH ONE TIME DAILY, Disp: 90 tablet, Rfl: 0    prednisoLONE acetate (PRED FORTE) 1 % ophthalmic suspension, Administer 1 drop into both eyes 4 (four) times a day. ** Shake well before administration **, Disp: , Rfl:     raloxifene (EVISTA) 60 mg tablet, TAKE 1 TABLET DAILY, Disp: 90 tablet, Rfl: 3    rosuvastatin (CRESTOR) 10 mg tablet, Take 1 tablet (10 mg total) by mouth daily., Disp: 90 tablet, Rfl: 3     valACYclovir (VALTREX) 1 gram tablet, Take 1,000 mg by mouth daily. To prevent herpes infection in her eyes  Because she is on fml eye drops, Disp: , Rfl:

## 2025-07-17 RX ORDER — ROSUVASTATIN CALCIUM 10 MG/1
10 TABLET, COATED ORAL DAILY
Qty: 90 TABLET | Refills: 3 | Status: SHIPPED | OUTPATIENT
Start: 2025-07-17

## 2025-08-19 ENCOUNTER — APPOINTMENT (OUTPATIENT)
Dept: CARDIOLOGY | Facility: CLINIC | Age: 89
End: 2025-08-19
Payer: MEDICARE

## (undated) DEVICE — KIT CATH LAB ANGIO

## (undated) DEVICE — ***USE 121412***PACK DEVICE IMPLANT TLH

## (undated) DEVICE — NEEDLE DISP SPINAL 22GX3-1/2IN